# Patient Record
Sex: FEMALE | Race: WHITE | NOT HISPANIC OR LATINO | Employment: FULL TIME | URBAN - METROPOLITAN AREA
[De-identification: names, ages, dates, MRNs, and addresses within clinical notes are randomized per-mention and may not be internally consistent; named-entity substitution may affect disease eponyms.]

---

## 2017-01-26 ENCOUNTER — OFFICE VISIT (OUTPATIENT)
Dept: INTERNAL MEDICINE | Facility: CLINIC | Age: 47
End: 2017-01-26

## 2017-01-26 VITALS
DIASTOLIC BLOOD PRESSURE: 78 MMHG | WEIGHT: 135 LBS | TEMPERATURE: 97.8 F | BODY MASS INDEX: 21.79 KG/M2 | SYSTOLIC BLOOD PRESSURE: 120 MMHG

## 2017-01-26 DIAGNOSIS — N30.90 CYSTITIS: ICD-10-CM

## 2017-01-26 DIAGNOSIS — R30.0 DYSURIA: Primary | ICD-10-CM

## 2017-01-26 LAB
BACTERIA UR QL AUTO: ABNORMAL /HPF
BILIRUB UR QL STRIP: NEGATIVE
CLARITY UR: ABNORMAL
COLOR UR: YELLOW
GLUCOSE UR STRIP-MCNC: NEGATIVE MG/DL
HGB UR QL STRIP.AUTO: ABNORMAL
HYALINE CASTS UR QL AUTO: ABNORMAL /LPF
KETONES UR QL STRIP: NEGATIVE
LEUKOCYTE ESTERASE UR QL STRIP.AUTO: ABNORMAL
NITRITE UR QL STRIP: NEGATIVE
PH UR STRIP.AUTO: <=5 [PH] (ref 5–8)
PROT UR QL STRIP: NEGATIVE
RBC # UR: ABNORMAL /HPF
REF LAB TEST METHOD: ABNORMAL
SP GR UR STRIP: <=1.005 (ref 1–1.03)
SQUAMOUS #/AREA URNS HPF: ABNORMAL /HPF
UROBILINOGEN UR QL STRIP: ABNORMAL
WBC UR QL AUTO: ABNORMAL /HPF

## 2017-01-26 PROCEDURE — 81001 URINALYSIS AUTO W/SCOPE: CPT | Performed by: NURSE PRACTITIONER

## 2017-01-26 PROCEDURE — 99213 OFFICE O/P EST LOW 20 MIN: CPT | Performed by: NURSE PRACTITIONER

## 2017-01-26 RX ORDER — PHENAZOPYRIDINE HYDROCHLORIDE 100 MG/1
100 TABLET, FILM COATED ORAL 3 TIMES DAILY PRN
Qty: 6 TABLET | Refills: 0 | Status: SHIPPED | OUTPATIENT
Start: 2017-01-26 | End: 2017-01-28

## 2017-01-26 RX ORDER — SULFAMETHOXAZOLE AND TRIMETHOPRIM 800; 160 MG/1; MG/1
1 TABLET ORAL 2 TIMES DAILY
Qty: 10 TABLET | Refills: 0 | Status: SHIPPED | OUTPATIENT
Start: 2017-01-26 | End: 2017-01-31

## 2017-01-26 NOTE — PATIENT INSTRUCTIONS
Urinary Tract Infection  Urinary tract infections (UTIs) can develop anywhere along your urinary tract. Your urinary tract is your body's drainage system for removing wastes and extra water. Your urinary tract includes two kidneys, two ureters, a bladder, and a urethra. Your kidneys are a pair of bean-shaped organs. Each kidney is about the size of your fist. They are located below your ribs, one on each side of your spine.  CAUSES  Infections are caused by microbes, which are microscopic organisms, including fungi, viruses, and bacteria. These organisms are so small that they can only be seen through a microscope. Bacteria are the microbes that most commonly cause UTIs.  SYMPTOMS   Symptoms of UTIs may vary by age and gender of the patient and by the location of the infection. Symptoms in young women typically include a frequent and intense urge to urinate and a painful, burning feeling in the bladder or urethra during urination. Older women and men are more likely to be tired, shaky, and weak and have muscle aches and abdominal pain. A fever may mean the infection is in your kidneys. Other symptoms of a kidney infection include pain in your back or sides below the ribs, nausea, and vomiting.  DIAGNOSIS  To diagnose a UTI, your caregiver will ask you about your symptoms. Your caregiver will also ask you to provide a urine sample. The urine sample will be tested for bacteria and white blood cells. White blood cells are made by your body to help fight infection.  TREATMENT   Typically, UTIs can be treated with medication. Because most UTIs are caused by a bacterial infection, they usually can be treated with the use of antibiotics. The choice of antibiotic and length of treatment depend on your symptoms and the type of bacteria causing your infection.  HOME CARE INSTRUCTIONS  · If you were prescribed antibiotics, take them exactly as your caregiver instructs you. Finish the medication even if you feel better after  you have only taken some of the medication.  · Drink enough water and fluids to keep your urine clear or pale yellow.  · Avoid caffeine, tea, and carbonated beverages. They tend to irritate your bladder.  · Empty your bladder often. Avoid holding urine for long periods of time.  · Empty your bladder before and after sexual intercourse.  · After a bowel movement, women should cleanse from front to back. Use each tissue only once.  SEEK MEDICAL CARE IF:   · You have back pain.  · You develop a fever.  · Your symptoms do not begin to resolve within 3 days.  SEEK IMMEDIATE MEDICAL CARE IF:   · You have severe back pain or lower abdominal pain.  · You develop chills.  · You have nausea or vomiting.  · You have continued burning or discomfort with urination.  MAKE SURE YOU:   · Understand these instructions.  · Will watch your condition.  · Will get help right away if you are not doing well or get worse.     This information is not intended to replace advice given to you by your health care provider. Make sure you discuss any questions you have with your health care provider.     Document Released: 09/27/2006 Document Revised: 09/07/2016 Document Reviewed: 01/25/2013  [employer] Interactive Patient Education ©2016 [employer]

## 2017-01-26 NOTE — PROGRESS NOTES
Subjective   Agnes Hayes is a 46 y.o. female.     Difficulty Urinating    This is a new problem. Episode onset: 2 days ago  The problem occurs intermittently. The problem has been gradually worsening. The quality of the pain is described as burning. The pain is at a severity of 10/10. The pain is severe. There has been no fever. She is sexually active. There is no history of pyelonephritis. Associated symptoms include frequency and urgency. Pertinent negatives include no chills, discharge, flank pain, hematuria, nausea or vomiting. Associated symptoms comments: Currently on menses . She has tried increased fluids for the symptoms. The treatment provided mild relief. Her past medical history is significant for recurrent UTIs (last in august ). There is no history of kidney stones.        The following portions of the patient's history were reviewed and updated as appropriate: allergies, current medications and problem list.    Review of Systems   Constitutional: Negative for activity change, appetite change, chills, fatigue and fever.   Gastrointestinal: Negative for abdominal pain, nausea and vomiting.   Genitourinary: Positive for difficulty urinating, dysuria, frequency and urgency. Negative for flank pain and hematuria.   Musculoskeletal: Negative for back pain.       Objective   Physical Exam   Constitutional: She is oriented to person, place, and time. She appears well-developed and well-nourished.   HENT:   Head: Normocephalic.   Nose: Nose normal.   Cardiovascular: Regular rhythm and normal heart sounds.  Exam reveals no S3 and no S4.    No murmur heard.  Pulmonary/Chest: Effort normal and breath sounds normal. She has no decreased breath sounds. She has no wheezes. She has no rhonchi. She has no rales.   Abdominal: Normal appearance and bowel sounds are normal. There is no hepatosplenomegaly. There is no tenderness. There is no CVA tenderness.   Musculoskeletal: She exhibits no edema.   Neurological: She is  alert and oriented to person, place, and time. Gait normal.   Skin: Skin is warm and dry.   Psychiatric: She has a normal mood and affect.       Assessment/Plan   Agnes was seen today for difficulty urinating.    Diagnoses and all orders for this visit:    Dysuria  -     Urinalysis With / Microscopic If Indicated; Future  -     Urinalysis With / Microscopic If Indicated  -     Urinalysis, Microscopic Only; Future  -     Urinalysis, Microscopic Only    Cystitis  Comments:  drink plenty of water; avoid caffiene; void as soon as urge   Orders:  -     phenazopyridine (PYRIDIUM) 100 MG tablet; Take 1 tablet by mouth 3 (Three) Times a Day As Needed for bladder spasms for up to 2 days.  -     sulfamethoxazole-trimethoprim (BACTRIM DS,SEPTRA DS) 800-160 MG per tablet; Take 1 tablet by mouth 2 (Two) Times a Day for 5 days.

## 2017-01-26 NOTE — MR AVS SNAPSHOT
Agnes Hayes   1/26/2017 8:00 AM   Office Visit    Dept Phone:  [phone]   Encounter #:  [id number]    Provider:  HENRY Mccracken   Department:  [facility] INTERNAL MEDICINE                Your Full Care Plan              Today's Medication Changes          These changes are accurate as of: 1/26/17  8:29 AM.  If you have any questions, ask your nurse or doctor.               New Medication(s)Ordered:     phenazopyridine 100 MG tablet   Commonly known as:  PYRIDIUM   Take 1 tablet by mouth 3 (Three) Times a Day As Needed for bladder spasms for up to 2 days.       sulfamethoxazole-trimethoprim 800-160 MG per tablet   Commonly known as:  BACTRIM DS,SEPTRA DS   Take 1 tablet by mouth 2 (Two) Times a Day for 5 days.            Where to Get Your Medications      These medications were sent to [place] [id number] [facility] [phone] [facility] [phone] [place] [address]     Phone:  [phone]     phenazopyridine 100 MG tablet    sulfamethoxazole-trimethoprim 800-160 MG per tablet                  Your Updated Medication List          This list is accurate as of: 1/26/17  8:29 AM.  Always use your most recent med list.                aspirin 81 MG EC tablet       b complex-C-E-zinc tablet       cetirizine 10 MG tablet   Commonly known as:  zyrTEC       cholecalciferol 1000 UNITS tablet   Commonly known as:  VITAMIN D3       fish oil 1000 MG capsule capsule       losartan 50 MG tablet   Commonly known as:  COZAAR   Take 1 tablet by mouth Daily.       multivitamin tablet tablet       phenazopyridine 100 MG tablet   Commonly known as:  PYRIDIUM   Take 1 tablet by mouth 3 (Three) Times a Day As Needed for bladder spasms for up to 2 days.       sulfamethoxazole-trimethoprim 800-160 MG per tablet   Commonly known as:  BACTRIM DS,SEPTRA DS   Take 1 tablet by mouth 2 (Two) Times a Day for 5 days.                  We Performed the Following     Urinalysis With / Microscopic If Indicated     Urinalysis, Microscopic Only       You Were Diagnosed With        Codes Comments    Dysuria    -  Primary ICD-10-CM: R30.0  ICD-9-CM: 788.1     Cystitis     ICD-10-CM: N30.90  ICD-9-CM: 595.9 drink plenty of water; avoid caffiene; void as soon as urge       Instructions    Urinary Tract Infection  Urinary tract infections (UTIs) can develop anywhere along your urinary tract. Your urinary tract is your body's drainage system for removing wastes and extra water. Your urinary tract includes two kidneys, two ureters, a bladder, and a urethra. Your kidneys are a pair of bean-shaped organs. Each kidney is about the size of your fist. They are located below your ribs, one on each side of your spine.  CAUSES  Infections are caused by microbes, which are microscopic organisms, including fungi, viruses, and bacteria. These organisms are so small that they can only be seen through a microscope. Bacteria are the microbes that most commonly cause UTIs.  SYMPTOMS   Symptoms of UTIs may vary by age and gender of the patient and by the location of the infection. Symptoms in young women typically include a frequent and intense urge to urinate and a painful, burning feeling in the bladder or urethra during urination. Older women and men are more likely to be tired, shaky, and weak and have muscle aches and abdominal pain. A fever may mean the infection is in your kidneys. Other symptoms of a kidney infection include pain in your back or sides below the ribs, nausea, and vomiting.  DIAGNOSIS  To diagnose a UTI, your caregiver will ask you about your symptoms. Your caregiver will also ask you to provide a urine sample. The urine sample will be tested for bacteria and white blood cells. White blood cells are made by your body to help fight infection.  TREATMENT   Typically, UTIs can be treated with medication. Because most UTIs are caused by a bacterial  infection, they usually can be treated with the use of antibiotics. The choice of antibiotic and length of treatment depend on your symptoms and the type of bacteria causing your infection.  HOME CARE INSTRUCTIONS  · If you were prescribed antibiotics, take them exactly as your caregiver instructs you. Finish the medication even if you feel better after you have only taken some of the medication.  · Drink enough water and fluids to keep your urine clear or pale yellow.  · Avoid caffeine, tea, and carbonated beverages. They tend to irritate your bladder.  · Empty your bladder often. Avoid holding urine for long periods of time.  · Empty your bladder before and after sexual intercourse.  · After a bowel movement, women should cleanse from front to back. Use each tissue only once.  SEEK MEDICAL CARE IF:   · You have back pain.  · You develop a fever.  · Your symptoms do not begin to resolve within 3 days.  SEEK IMMEDIATE MEDICAL CARE IF:   · You have severe back pain or lower abdominal pain.  · You develop chills.  · You have nausea or vomiting.  · You have continued burning or discomfort with urination.  MAKE SURE YOU:   · Understand these instructions.  · Will watch your condition.  · Will get help right away if you are not doing well or get worse.     This information is not intended to replace advice given to you by your health care provider. Make sure you discuss any questions you have with your health care provider.     Document Released: 09/27/2006 Document Revised: 09/07/2016 Document Reviewed: 01/25/2013  [employer] Interactive Patient Education ©2016 [employer]       Patient Instructions History      Upcoming Appointments     Visit Type Date Time Department    ACUTE           1/26/2017  8:00 AM [employer]    FOLLOW UP 5/30/2017  3:30 PM [employer] Signup     [facility] allows you to send messages to your doctor, view your test results, renew your prescriptions, schedule  appointments, and more. To sign up, go to [employer] and click on the Sign Up Now link in the New User? box. Enter your [employer] Activation Code exactly as it appears below along with the last four digits of your Social Security Number and your Date of Birth () to complete the sign-up process. If you do not sign up before the expiration date, you must request a new code.    [employer] Activation Code: [email or web address]  Expires: 2017  8:29 AM    If you have questions, you can email [email or web address] or call [phone] to talk to our [employer] staff. Remember, [employer] is NOT to be used for urgent needs. For medical emergencies, dial 911.               Other Info from Your Visit           Your Appointments     May 30, 2017  3:30 PM EDT   Follow Up with Sophia Sullivan MD   [facility] INTERNAL MEDICINE (--)    [address] [place] [id number] [phone]           Arrive 15 minutes prior to appointment.              Allergies     No Known Allergies      Reason for Visit     Difficulty Urinating burning, urgency, frequency      Vital Signs     Blood Pressure Temperature Weight Body Mass Index Smoking Status       120/78 (BP Location: Right arm, Patient Position: Sitting, Cuff Size: Adult) 97.8 °F (36.6 °C) 135 lb (61.2 kg) 21.79 kg/m2 Never Smoker       Problems and Diagnoses Noted     Difficult or painful urination    -  Primary    Inflammation of bladder          Results     Urinalysis With / Microscopic If Indicated      Component Value Standard Range & Units    Color, UA Yellow Yellow, Straw    Appearance, UA Slightly Cloudy Clear    pH, UA <=5.0 5.0 - 8.0    Specific Gravity, UA <=1.005 1.005 - 1.030    Glucose, UA Negative Negative    Ketones, UA Negative Negative    Bilirubin, UA Negative Negative    Blood, UA Large (3+) Negative    Protein, UA Negative Negative    Leuk Esterase, UA Moderate (2+) Negative    Nitrite, UA Negative Negative     Urobilinogen, UA 0.2 E.U./dL 0.2 - 1.0 E.U./dL                Urinalysis, Microscopic Only      Component Value Standard Range & Units    RBC, UA 3-5 None Seen /HPF    Patient is on menses.    WBC, UA 31-50 None Seen /HPF    Bacteria, UA 1+ None Seen /HPF    Squamous Epithelial Cells, UA 7-12 None Seen, 0-2 /HPF    Hyaline Casts, UA None Seen None Seen /LPF    Methodology Manual Light Microscopy

## 2017-01-28 LAB
BACTERIA UR CULT: ABNORMAL
Lab: ABNORMAL
SUSCEPTIBILITY TESTING: ABNORMAL

## 2017-04-25 ENCOUNTER — OFFICE VISIT (OUTPATIENT)
Dept: INTERNAL MEDICINE | Facility: CLINIC | Age: 47
End: 2017-04-25

## 2017-04-25 VITALS
SYSTOLIC BLOOD PRESSURE: 112 MMHG | BODY MASS INDEX: 21.86 KG/M2 | HEIGHT: 66 IN | HEART RATE: 60 BPM | DIASTOLIC BLOOD PRESSURE: 78 MMHG | WEIGHT: 136 LBS | TEMPERATURE: 98.2 F | OXYGEN SATURATION: 99 %

## 2017-04-25 DIAGNOSIS — N30.90 CYSTITIS: ICD-10-CM

## 2017-04-25 DIAGNOSIS — R35.0 FREQUENT URINATION: Primary | ICD-10-CM

## 2017-04-25 LAB
BACTERIA UR QL AUTO: ABNORMAL /HPF
BILIRUB UR QL STRIP: NEGATIVE
CLARITY UR: ABNORMAL
COLOR UR: YELLOW
GLUCOSE UR STRIP-MCNC: NEGATIVE MG/DL
HGB UR QL STRIP.AUTO: ABNORMAL
HYALINE CASTS UR QL AUTO: ABNORMAL /LPF
KETONES UR QL STRIP: NEGATIVE
LEUKOCYTE ESTERASE UR QL STRIP.AUTO: ABNORMAL
NITRITE UR QL STRIP: NEGATIVE
PH UR STRIP.AUTO: 7 [PH] (ref 5–8)
PROT UR QL STRIP: NEGATIVE
RBC # UR: ABNORMAL /HPF
REF LAB TEST METHOD: ABNORMAL
SP GR UR STRIP: 1.01 (ref 1–1.03)
SQUAMOUS #/AREA URNS HPF: ABNORMAL /HPF
UROBILINOGEN UR QL STRIP: ABNORMAL
WBC UR QL AUTO: ABNORMAL /HPF

## 2017-04-25 PROCEDURE — 81001 URINALYSIS AUTO W/SCOPE: CPT | Performed by: NURSE PRACTITIONER

## 2017-04-25 PROCEDURE — 99213 OFFICE O/P EST LOW 20 MIN: CPT | Performed by: NURSE PRACTITIONER

## 2017-04-25 RX ORDER — PHENAZOPYRIDINE HYDROCHLORIDE 100 MG/1
100 TABLET, FILM COATED ORAL 3 TIMES DAILY PRN
Qty: 6 TABLET | Refills: 0 | Status: SHIPPED | OUTPATIENT
Start: 2017-04-25 | End: 2017-04-27

## 2017-04-25 RX ORDER — SULFAMETHOXAZOLE AND TRIMETHOPRIM 800; 160 MG/1; MG/1
1 TABLET ORAL 2 TIMES DAILY
Qty: 14 TABLET | Refills: 0 | Status: SHIPPED | OUTPATIENT
Start: 2017-04-25 | End: 2017-05-02

## 2017-04-25 NOTE — PATIENT INSTRUCTIONS
Urinary Tract Infection, Adult  A urinary tract infection (UTI) is an infection of any part of the urinary tract, which includes the kidneys, ureters, bladder, and urethra. These organs make, store, and get rid of urine in the body. UTI can be a bladder infection (cystitis) or kidney infection (pyelonephritis).  CAUSES  This infection may be caused by fungi, viruses, or bacteria. Bacteria are the most common cause of UTIs. This condition can also be caused by repeated incomplete emptying of the bladder during urination.   RISK FACTORS  This condition is more likely to develop if:   · You ignore your need to urinate or hold urine for long periods of time.  · You do not empty your bladder completely during urination.  · You wipe back to front after urinating or having a bowel movement, if you are female.  · You are uncircumcised, if you are male.    · You are constipated.  · You have a urinary catheter that stays in place (indwelling).  · You have a weak defense (immune) system.  · You have a medical condition that affects your bowels, kidneys, or bladder.  · You have diabetes.  · You take antibiotic medicines frequently or for long periods of time, and the antibiotics no longer work well against certain types of infections (antibiotic resistance).  · You take medicines that irritate your urinary tract.  · You are exposed to chemicals that irritate your urinary tract.  · You are female.  SYMPTOMS   Symptoms of this condition include:   · Fever.    · Frequent urination or passing small amounts of urine frequently.    · Needing to urinate urgently.    · Pain or burning with urination.    · Urine that smells bad or unusual.    · Cloudy urine.    · Pain in the lower abdomen or back.    · Trouble urinating.    · Blood in the urine.    · Vomiting or being less hungry than normal.     · Diarrhea or abdominal pain.    · Vaginal discharge, if you are female.  DIAGNOSIS  This condition is diagnosed with a medical history and  physical exam. You will also need to provide a urine sample to test your urine. Other tests may be done, including:    · Blood tests.    · Sexually transmitted disease (STD) testing.     If you have had more than one UTI, a cystoscopy or imaging studies may be done to determine the cause of the infections.   TREATMENT   Treatment for this condition often includes a combination of two or more of the following:   · Antibiotic medicine.    · Other medicines to treat less common causes of UTI.    · Over-the-counter medicines to treat pain.    · Drinking enough water to stay hydrated.  HOME CARE INSTRUCTIONS  · Take over-the-counter and prescription medicines only as told by your health care provider.  · If you were prescribed an antibiotic, take it as told by your health care provider. Do not stop taking the antibiotic even if you start to feel better.  · Avoid alcohol, caffeine, tea, and carbonated beverages. They can irritate your bladder.  · Drink enough fluid to keep your urine clear or pale yellow.  · Keep all follow-up visits as told by your health care provider. This is important.  · Make sure to:    Empty your bladder often and completely. Do not hold urine for long periods of time.    Empty your bladder before and after sex.    Wipe from front to back after a bowel movement if you are female. Use each tissue one time when you wipe.  SEEK MEDICAL CARE IF:   · You have back pain.    · You have a fever.  · You feel nauseous or vomit.    · Your symptoms do not get better after 3 days.     · Your symptoms go away and then return.  SEEK IMMEDIATE MEDICAL CARE IF:   · You have severe back pain or lower abdominal pain.    · You are vomiting and cannot keep down any medicines or water.     This information is not intended to replace advice given to you by your health care provider. Make sure you discuss any questions you have with your health care provider.     Document Released: 09/27/2006 Document Revised: 01/13/2017  Document Reviewed: 11/07/2016  [employer] Interactive Patient Education ©2016 [employer].

## 2017-04-25 NOTE — PROGRESS NOTES
Subjective   Agnes Hayes is a 46 y.o. female.     Urinary Tract Infection    This is a new problem. Episode onset: 3 days ago  The problem occurs intermittently. The problem has been gradually worsening. The quality of the pain is described as burning. The pain is at a severity of 10/10. The pain is severe. There has been no fever. She is sexually active. There is no history of pyelonephritis. Associated symptoms include frequency and urgency. Pertinent negatives include no chills, discharge, flank pain, hematuria, nausea or vomiting. Associated symptoms comments: Denies nocturia . She has tried increased fluids for the symptoms. The treatment provided mild relief. Her past medical history is significant for recurrent UTIs.        The following portions of the patient's history were reviewed and updated as appropriate: allergies, current medications and problem list.    Review of Systems   Constitutional: Negative for chills, fatigue and fever.   Gastrointestinal: Negative for abdominal pain, nausea and vomiting.   Genitourinary: Positive for dysuria, frequency and urgency. Negative for flank pain and hematuria.   Musculoskeletal: Negative for back pain.       Objective   Physical Exam   Constitutional: She is oriented to person, place, and time. She appears well-developed and well-nourished.   HENT:   Head: Normocephalic.   Nose: Nose normal.   Cardiovascular: Regular rhythm and normal heart sounds.  Exam reveals no S3 and no S4.    No murmur heard.  Pulmonary/Chest: Effort normal and breath sounds normal. She has no decreased breath sounds. She has no wheezes. She has no rhonchi. She has no rales.   Abdominal: Normal appearance and bowel sounds are normal. There is no tenderness. There is no CVA tenderness.   Musculoskeletal: She exhibits no edema.   Neurological: She is alert and oriented to person, place, and time. Gait normal.   Skin: Skin is warm and dry.   Psychiatric: She has a normal mood and affect.        Assessment/Plan   Agnes was seen today for urinary tract infection.    Diagnoses and all orders for this visit:    Frequent urination  -     Urinalysis w/ Microscopic if Indicated  -     Urinalysis, Microscopic Only; Future  -     Urinalysis, Microscopic Only    Cystitis  Comments:  drink plenty of water; void after intercourse; avoid caffiene  Orders:  -     Urine Culture; Future  -     phenazopyridine (PYRIDIUM) 100 MG tablet; Take 1 tablet by mouth 3 (Three) Times a Day As Needed for bladder spasms for up to 2 days.  -     sulfamethoxazole-trimethoprim (BACTRIM DS,SEPTRA DS) 800-160 MG per tablet; Take 1 tablet by mouth 2 (Two) Times a Day for 7 days.      Urine with blood, white blood cells and bacteria. Will treat and obtain urine culture.

## 2017-04-27 LAB
BACTERIA UR CULT: NORMAL
Lab: NORMAL

## 2017-05-30 ENCOUNTER — OFFICE VISIT (OUTPATIENT)
Dept: INTERNAL MEDICINE | Facility: CLINIC | Age: 47
End: 2017-05-30

## 2017-05-30 VITALS
SYSTOLIC BLOOD PRESSURE: 118 MMHG | DIASTOLIC BLOOD PRESSURE: 74 MMHG | BODY MASS INDEX: 22.08 KG/M2 | WEIGHT: 137.4 LBS | HEIGHT: 66 IN

## 2017-05-30 DIAGNOSIS — Z00.00 ANNUAL PHYSICAL EXAM: Primary | ICD-10-CM

## 2017-05-30 DIAGNOSIS — E78.49 OTHER HYPERLIPIDEMIA: ICD-10-CM

## 2017-05-30 DIAGNOSIS — I10 ESSENTIAL HYPERTENSION: ICD-10-CM

## 2017-05-30 DIAGNOSIS — Z12.11 SCREEN FOR COLON CANCER: ICD-10-CM

## 2017-05-30 DIAGNOSIS — K59.01 SLOW TRANSIT CONSTIPATION: ICD-10-CM

## 2017-05-30 DIAGNOSIS — Z12.4 SCREENING FOR CERVICAL CANCER: ICD-10-CM

## 2017-05-30 DIAGNOSIS — Z12.31 ENCOUNTER FOR SCREENING MAMMOGRAM FOR BREAST CANCER: ICD-10-CM

## 2017-05-30 DIAGNOSIS — Z23 NEED FOR VACCINATION: ICD-10-CM

## 2017-05-30 DIAGNOSIS — Z82.49 FAMILY HISTORY OF HEART DISEASE: ICD-10-CM

## 2017-05-30 LAB — HEMOCCULT STL QL IA: NEGATIVE

## 2017-05-30 PROCEDURE — 99396 PREV VISIT EST AGE 40-64: CPT | Performed by: INTERNAL MEDICINE

## 2017-05-30 PROCEDURE — 90715 TDAP VACCINE 7 YRS/> IM: CPT | Performed by: INTERNAL MEDICINE

## 2017-05-30 PROCEDURE — 90471 IMMUNIZATION ADMIN: CPT | Performed by: INTERNAL MEDICINE

## 2017-05-30 PROCEDURE — 82274 ASSAY TEST FOR BLOOD FECAL: CPT | Performed by: INTERNAL MEDICINE

## 2017-05-30 RX ORDER — LIDOCAINE HCL 4 %
1 CREAM (GRAM) TOPICAL DAILY
COMMUNITY
End: 2018-06-28 | Stop reason: HOSPADM

## 2017-06-02 LAB
CHROM ANALY OVERALL INTERP-IMP: NORMAL
CONV .: NORMAL
CONV PERFORMED BY:: NORMAL
DX ICD CODE: NORMAL
HIV 1 & 2 AB SER-IMP: NORMAL
HPV I/H RISK 1 DNA CVX QL PROBE+SIG AMP: NEGATIVE
REF LAB TEST METHOD: NORMAL
STAT OF ADQ CVX/VAG CYTO-IMP: NORMAL

## 2017-06-13 ENCOUNTER — HOSPITAL ENCOUNTER (OUTPATIENT)
Dept: MAMMOGRAPHY | Facility: HOSPITAL | Age: 47
Discharge: HOME | End: 2017-06-13
Attending: INTERNAL MEDICINE | Admitting: INTERNAL MEDICINE

## 2017-06-13 DIAGNOSIS — Z12.31 ENCOUNTER FOR SCREENING MAMMOGRAM FOR BREAST CANCER: ICD-10-CM

## 2017-06-13 PROCEDURE — G0202 SCR MAMMO BI INCL CAD: HCPCS

## 2017-06-16 DIAGNOSIS — N64.89 BREAST ASYMMETRY: Primary | ICD-10-CM

## 2017-06-29 ENCOUNTER — HOSPITAL ENCOUNTER (OUTPATIENT)
Dept: MAMMOGRAPHY | Facility: HOSPITAL | Age: 47
Discharge: HOME | End: 2017-06-29
Attending: INTERNAL MEDICINE | Admitting: INTERNAL MEDICINE

## 2017-06-29 ENCOUNTER — HOSPITAL ENCOUNTER (OUTPATIENT)
Dept: ULTRASOUND IMAGING | Facility: HOSPITAL | Age: 47
Discharge: HOME | End: 2017-06-29
Attending: INTERNAL MEDICINE

## 2017-06-29 DIAGNOSIS — N64.89 BREAST ASYMMETRY: ICD-10-CM

## 2017-06-29 PROCEDURE — G0206 DX MAMMO INCL CAD UNI: HCPCS

## 2017-06-29 PROCEDURE — 76642 ULTRASOUND BREAST LIMITED: CPT

## 2017-08-07 ENCOUNTER — TELEPHONE (OUTPATIENT)
Dept: INTERNAL MEDICINE | Facility: CLINIC | Age: 47
End: 2017-08-07

## 2017-08-07 ENCOUNTER — LAB (OUTPATIENT)
Dept: INTERNAL MEDICINE | Facility: CLINIC | Age: 47
End: 2017-08-07

## 2017-08-07 DIAGNOSIS — N39.0 URINARY TRACT INFECTION, SITE UNSPECIFIED: Primary | ICD-10-CM

## 2017-08-07 DIAGNOSIS — I10 ESSENTIAL HYPERTENSION: ICD-10-CM

## 2017-08-07 DIAGNOSIS — R82.90 ABNORMAL FINDING IN URINE: Primary | ICD-10-CM

## 2017-08-07 DIAGNOSIS — E78.49 OTHER HYPERLIPIDEMIA: ICD-10-CM

## 2017-08-07 LAB
ALBUMIN SERPL-MCNC: 3.91 G/DL (ref 3.4–4.6)
ALBUMIN/GLOB SERPL: 1.4 G/DL
ALP SERPL-CCNC: 59 U/L (ref 46–116)
ALT SERPL W P-5'-P-CCNC: 17 U/L (ref 14–59)
ANION GAP SERPL CALCULATED.3IONS-SCNC: 9 MMOL/L
AST SERPL-CCNC: 13 U/L (ref 7–37)
BACTERIA UR QL AUTO: ABNORMAL /HPF
BASOPHILS # BLD AUTO: 0.02 10*3/MM3 (ref 0–0.2)
BASOPHILS NFR BLD AUTO: 0.7 % (ref 0–1.5)
BILIRUB SERPL-MCNC: 0.4 MG/DL (ref 0.2–1)
BILIRUB UR QL STRIP: NEGATIVE
BUN BLD-MCNC: 14 MG/DL (ref 6–22)
BUN/CREAT SERPL: 19.2 (ref 7–25)
CALCIUM SPEC-SCNC: 9 MG/DL (ref 8.6–10.5)
CHLORIDE SERPL-SCNC: 105 MMOL/L (ref 95–107)
CHOLEST SERPL-MCNC: 194 MG/DL (ref 0–200)
CLARITY UR: ABNORMAL
CO2 SERPL-SCNC: 28 MMOL/L (ref 23–32)
COLOR UR: YELLOW
CREAT BLD-MCNC: 0.73 MG/DL (ref 0.55–1.02)
EOSINOPHIL # BLD AUTO: 0.1 10*3/MM3 (ref 0–0.7)
EOSINOPHIL # BLD AUTO: 3.3 % (ref 0.3–6.2)
ERYTHROCYTE [DISTWIDTH] IN BLOOD BY AUTOMATED COUNT: 12.1 % (ref 11.7–13)
GFR SERPL CREATININE-BSD FRML MDRD: 86 ML/MIN/1.73
GLOBULIN UR ELPH-MCNC: 2.9 GM/DL
GLUCOSE BLD-MCNC: 85 MG/DL (ref 70–100)
GLUCOSE UR STRIP-MCNC: NEGATIVE MG/DL
HCT VFR BLD AUTO: 39.1 % (ref 35.6–45.5)
HDLC SERPL-MCNC: 73 MG/DL (ref 40–81)
HGB BLD-MCNC: 12.9 G/DL (ref 11.9–15.5)
HGB UR QL STRIP.AUTO: ABNORMAL
HYALINE CASTS UR QL AUTO: ABNORMAL /LPF
IMM GRANULOCYTES # BLD: 0 10*3/MM3 (ref 0–0.03)
IMM GRANULOCYTES NFR BLD: 0 % (ref 0–0.5)
KETONES UR QL STRIP: ABNORMAL
LDLC SERPL CALC-MCNC: 110 MG/DL (ref 0–100)
LDLC/HDLC SERPL: 1.51 {RATIO}
LEUKOCYTE ESTERASE UR QL STRIP.AUTO: ABNORMAL
LYMPHOCYTES # BLD AUTO: 1.03 10*3/MM3 (ref 0.9–4.8)
LYMPHOCYTES NFR BLD AUTO: 33.7 % (ref 19.6–45.3)
MCH RBC QN AUTO: 30.8 PG (ref 26.9–32)
MCHC RBC AUTO-ENTMCNC: 33 G/DL (ref 32.4–36.3)
MCV RBC AUTO: 93.3 FL (ref 80.5–98.2)
MONOCYTES # BLD AUTO: 0.22 10*3/MM3 (ref 0.2–1.2)
MONOCYTES NFR BLD AUTO: 7.2 % (ref 5–12)
NEUTROPHILS # BLD AUTO: 1.69 10*3/MM3 (ref 1.9–8.1)
NEUTROPHILS NFR BLD AUTO: 55.1 % (ref 42.7–76)
NITRITE UR QL STRIP: NEGATIVE
PH UR STRIP.AUTO: 6 [PH] (ref 5–8)
PLATELET # BLD AUTO: 225 10*3/MM3 (ref 140–500)
POTASSIUM BLD-SCNC: 4.4 MMOL/L (ref 3.3–5.3)
PROT SERPL-MCNC: 6.8 G/DL (ref 6.3–8.4)
PROT UR QL STRIP: ABNORMAL
RBC # BLD AUTO: 4.19 10*6/MM3 (ref 3.9–5.2)
RBC # UR: ABNORMAL /HPF
REF LAB TEST METHOD: ABNORMAL
SODIUM BLD-SCNC: 142 MMOL/L (ref 136–145)
SP GR UR STRIP: 1.02 (ref 1–1.03)
SQUAMOUS #/AREA URNS HPF: ABNORMAL /HPF
TRIGL SERPL-MCNC: 54 MG/DL (ref 0–150)
UROBILINOGEN UR QL STRIP: ABNORMAL
VLDLC SERPL-MCNC: 10.8 MG/DL
WBC # BLD AUTO: 3.06 10*3/MM3 (ref 4.5–10.7)
WBC UR QL AUTO: ABNORMAL /HPF

## 2017-08-07 PROCEDURE — 81001 URINALYSIS AUTO W/SCOPE: CPT | Performed by: INTERNAL MEDICINE

## 2017-08-07 PROCEDURE — 80061 LIPID PANEL: CPT | Performed by: INTERNAL MEDICINE

## 2017-08-07 PROCEDURE — 80053 COMPREHEN METABOLIC PANEL: CPT | Performed by: INTERNAL MEDICINE

## 2017-08-07 RX ORDER — SULFAMETHOXAZOLE AND TRIMETHOPRIM 800; 160 MG/1; MG/1
1 TABLET ORAL 2 TIMES DAILY
Qty: 19 TABLET | Refills: 0 | Status: SHIPPED | OUTPATIENT
Start: 2017-08-07 | End: 2018-06-28

## 2017-08-07 NOTE — TELEPHONE ENCOUNTER
Dr. Sullivan patient:    U/A is in process and currently waiting on mirco from lab. Once results are received they will be placed in your tray for review.    Thank you

## 2017-08-07 NOTE — TELEPHONE ENCOUNTER
----- Message from Varsha Sylvester sent at 8/7/2017  9:17 AM EDT -----  Contact: patient  Patient wants to add another pharmacy to her chart and it is the one that she wants to use TODAY if she does indeed have a UTI,  like she thinks she does.  She just had labs done.    [facility]  [address] [facility] [address] [facility]  [phone]    Thank you,    Varsha

## 2017-08-10 LAB
BACTERIA UR CULT: ABNORMAL
Lab: ABNORMAL
SUSCEPTIBILITY TESTING: ABNORMAL

## 2017-08-16 ENCOUNTER — TELEPHONE (OUTPATIENT)
Dept: INTERNAL MEDICINE | Facility: CLINIC | Age: 47
End: 2017-08-16

## 2017-08-16 NOTE — TELEPHONE ENCOUNTER
----- Message from Kierra Domínguez sent at 8/16/2017  1:52 PM EDT -----  Contact: Patient  Patient called.  Dr. Brewer prescribed the patient Bactrim last week for UTI.  Patient began taking it 08/10/2017.  Patient began having stomach cramps yesterday, and patient feels as if she is having diarrhea but when she goes to bathroom it is only mucus and blood, and that is since yesterday too.  Should she continue the Bactrim?  Patient concerned.  Please advise.      Patient:  [phone]    Pharmacy:  [facility] [address] AT [facility] - [phone] [facility] [phone] FX

## 2017-08-17 NOTE — TELEPHONE ENCOUNTER
I left message - come in today if still having problems.  Ok to stop bactrim if she already took 3 days of ab.

## 2017-12-27 DIAGNOSIS — I10 HYPERTENSION, BENIGN: ICD-10-CM

## 2017-12-27 RX ORDER — LOSARTAN POTASSIUM 50 MG/1
TABLET ORAL
Qty: 90 TABLET | Refills: 1 | Status: SHIPPED | OUTPATIENT
Start: 2017-12-27 | End: 2018-06-17 | Stop reason: SDUPTHER

## 2018-06-17 DIAGNOSIS — I10 HYPERTENSION, BENIGN: ICD-10-CM

## 2018-06-18 RX ORDER — LOSARTAN POTASSIUM 50 MG/1
TABLET ORAL
Qty: 90 TABLET | Refills: 1 | Status: SHIPPED | OUTPATIENT
Start: 2018-06-18 | End: 2018-12-16 | Stop reason: SDUPTHER

## 2018-06-28 ENCOUNTER — OFFICE VISIT (OUTPATIENT)
Dept: INTERNAL MEDICINE | Facility: CLINIC | Age: 48
End: 2018-06-28

## 2018-06-28 VITALS
OXYGEN SATURATION: 98 % | BODY MASS INDEX: 23.3 KG/M2 | HEART RATE: 61 BPM | DIASTOLIC BLOOD PRESSURE: 80 MMHG | SYSTOLIC BLOOD PRESSURE: 126 MMHG | HEIGHT: 66 IN | WEIGHT: 145 LBS

## 2018-06-28 DIAGNOSIS — Z00.00 ANNUAL PHYSICAL EXAM: Primary | ICD-10-CM

## 2018-06-28 DIAGNOSIS — Z12.31 ENCOUNTER FOR SCREENING MAMMOGRAM FOR BREAST CANCER: ICD-10-CM

## 2018-06-28 DIAGNOSIS — Z82.49 FAMILY HISTORY OF HEART DISEASE: ICD-10-CM

## 2018-06-28 DIAGNOSIS — E78.49 OTHER HYPERLIPIDEMIA: ICD-10-CM

## 2018-06-28 DIAGNOSIS — I10 ESSENTIAL HYPERTENSION: ICD-10-CM

## 2018-06-28 PROCEDURE — 99396 PREV VISIT EST AGE 40-64: CPT | Performed by: INTERNAL MEDICINE

## 2018-06-28 NOTE — PROGRESS NOTES
"Subjective   Agnes Hayes is a 48 y.o. female here for   Chief Complaint   Patient presents with   • Annual Exam   • Hyperlipidemia   • Hypertension   .    Vitals:    06/28/18 1451   BP: 126/80   BP Location: Left arm   Patient Position: Sitting   Cuff Size: Adult   Pulse: 61   SpO2: 98%   Weight: 65.8 kg (145 lb)   Height: 167.6 cm (66\")       Body mass index is 23.4 kg/m².    Hyperlipidemia   This is a chronic problem. The current episode started more than 1 year ago. The problem is controlled. Recent lipid tests were reviewed and are normal. She has no history of diabetes. Pertinent negatives include no chest pain, myalgias or shortness of breath.   Hypertension   This is a chronic problem. The current episode started more than 1 year ago. The problem is unchanged. The problem is controlled. Pertinent negatives include no chest pain, headaches, neck pain, palpitations or shortness of breath.        The following portions of the patient's history were reviewed and updated as appropriate: allergies, current medications, past social history and problem list.    Review of Systems   Constitutional: Negative for appetite change, chills, fatigue, fever and unexpected weight change.   HENT: Negative for congestion, ear pain, mouth sores, sore throat, tinnitus, trouble swallowing and voice change.    Eyes: Negative for pain and visual disturbance.   Respiratory: Negative for cough, choking, shortness of breath and wheezing.    Cardiovascular: Negative for chest pain, palpitations and leg swelling.   Gastrointestinal: Negative for abdominal pain, blood in stool, constipation, diarrhea, nausea and vomiting.   Endocrine: Negative for cold intolerance, heat intolerance, polydipsia and polyuria.   Genitourinary: Negative for difficulty urinating, dysuria, flank pain, frequency, hematuria and urgency.   Musculoskeletal: Negative for arthralgias, back pain, gait problem, joint swelling, myalgias, neck pain and neck stiffness. "   Skin: Negative for color change and rash.   Allergic/Immunologic: Negative for environmental allergies, food allergies and immunocompromised state.   Neurological: Negative for dizziness, tremors, seizures, syncope, speech difficulty, weakness, numbness and headaches.   Hematological: Negative for adenopathy. Does not bruise/bleed easily.   Psychiatric/Behavioral: Negative for agitation, confusion, decreased concentration, sleep disturbance and suicidal ideas. The patient is not nervous/anxious.        Objective   Physical Exam   Constitutional: She appears well-developed and well-nourished.   HENT:   Right Ear: Hearing, tympanic membrane, external ear and ear canal normal.   Left Ear: Hearing, tympanic membrane, external ear and ear canal normal.   Nose: Right sinus exhibits no maxillary sinus tenderness and no frontal sinus tenderness. Left sinus exhibits no maxillary sinus tenderness and no frontal sinus tenderness.   Eyes: Conjunctivae, EOM and lids are normal. Pupils are equal, round, and reactive to light.   Neck: Trachea normal. Neck supple. No JVD present. Carotid bruit is not present. No tracheal deviation present. No thyroid mass and no thyromegaly present.   Cardiovascular: Normal rate, regular rhythm, S1 normal and S2 normal.  Exam reveals no gallop and no friction rub.    No murmur heard.  Pulses:       Carotid pulses are 2+ on the right side, and 2+ on the left side.       Radial pulses are 2+ on the right side, and 2+ on the left side.        Dorsalis pedis pulses are 2+ on the right side, and 2+ on the left side.        Posterior tibial pulses are 2+ on the right side, and 2+ on the left side.   Pulmonary/Chest: Effort normal and breath sounds normal. Chest wall is not dull to percussion. Right breast exhibits no inverted nipple, no mass, no nipple discharge, no skin change and no tenderness. Left breast exhibits no inverted nipple, no mass, no nipple discharge, no skin change and no tenderness.    Abdominal: Soft. Normal aorta and bowel sounds are normal. She exhibits no abdominal bruit. There is no hepatosplenomegaly. There is no tenderness. There is no rebound and no guarding. No hernia.   Musculoskeletal: Normal range of motion. She exhibits no edema.   Lymphadenopathy:     She has no cervical adenopathy.     She has no axillary adenopathy.        Right: No supraclavicular adenopathy present.        Left: No supraclavicular adenopathy present.   Neurological: She is alert. She has normal strength. No cranial nerve deficit or sensory deficit. She displays a negative Romberg sign.   Reflex Scores:       Patellar reflexes are 2+ on the right side and 2+ on the left side.  Skin: Skin is warm and dry.   Nursing note and vitals reviewed.      Assessment/Plan   Diagnoses and all orders for this visit:    Annual physical exam  -     CBC Auto Differential; Future  -     Comprehensive Metabolic Panel; Future  -     Urinalysis With Microscopic If Indicated (No Culture) - Urine, Clean Catch; Future    Essential hypertension  Comments:  need weekly chk & call if bp over 140/90    Other hyperlipidemia  Comments:  continue diet/ex  Orders:  -     High Sensitivity CRP; Future  -     Homocysteine; Future  -     Lipoprotein NMR; Future    Family history of heart disease  Comments:  need cardiac testing  Orders:  -     High Sensitivity CRP; Future  -     Homocysteine; Future  -     Lipoprotein NMR; Future    Encounter for screening mammogram for breast cancer  -     Mammo Screening Bilateral With CAD; Future

## 2018-07-06 ENCOUNTER — HOSPITAL ENCOUNTER (OUTPATIENT)
Dept: MAMMOGRAPHY | Facility: HOSPITAL | Age: 48
Discharge: HOME | End: 2018-07-06
Attending: INTERNAL MEDICINE | Admitting: INTERNAL MEDICINE

## 2018-07-06 DIAGNOSIS — Z12.31 ENCOUNTER FOR SCREENING MAMMOGRAM FOR BREAST CANCER: ICD-10-CM

## 2018-07-06 PROCEDURE — 77067 SCR MAMMO BI INCL CAD: CPT

## 2018-07-16 ENCOUNTER — TELEPHONE (OUTPATIENT)
Dept: INTERNAL MEDICINE | Facility: CLINIC | Age: 48
End: 2018-07-16

## 2018-07-16 NOTE — TELEPHONE ENCOUNTER
----- Message from Zulma Michele sent at 7/16/2018 11:11 AM EDT -----  Contact: PT  Patient called and is asking for a diagnostic left breast US order to be placed in her chart for breast mass. She received a letter from [facility] saying she needed the test done and they were waiting on Dr Sullivan to put the order in. She is going to call scheduling tomorrow to see if they have the order.     Please advise

## 2018-07-16 NOTE — TELEPHONE ENCOUNTER
Results have been sent to Dr. Sullivan for review.     She will review results once she return back in the office.

## 2018-07-17 DIAGNOSIS — R92.8 ABNORMAL MAMMOGRAM: Primary | ICD-10-CM

## 2018-07-23 ENCOUNTER — HOSPITAL ENCOUNTER (OUTPATIENT)
Dept: ULTRASOUND IMAGING | Facility: HOSPITAL | Age: 48
Discharge: HOME | End: 2018-07-23
Attending: INTERNAL MEDICINE | Admitting: INTERNAL MEDICINE

## 2018-07-23 DIAGNOSIS — R92.8 ABNORMAL MAMMOGRAM: ICD-10-CM

## 2018-07-23 PROCEDURE — 76642 ULTRASOUND BREAST LIMITED: CPT

## 2018-08-10 ENCOUNTER — OFFICE VISIT (OUTPATIENT)
Dept: OBSTETRICS AND GYNECOLOGY | Age: 48
End: 2018-08-10

## 2018-08-10 VITALS
WEIGHT: 153 LBS | HEIGHT: 66 IN | SYSTOLIC BLOOD PRESSURE: 117 MMHG | DIASTOLIC BLOOD PRESSURE: 78 MMHG | BODY MASS INDEX: 24.59 KG/M2

## 2018-08-10 DIAGNOSIS — R30.0 BURNING WITH URINATION: Primary | ICD-10-CM

## 2018-08-10 DIAGNOSIS — N39.0 RECURRENT UTI: ICD-10-CM

## 2018-08-10 DIAGNOSIS — R32 INCONTINENCE IN FEMALE: ICD-10-CM

## 2018-08-10 PROCEDURE — 99203 OFFICE O/P NEW LOW 30 MIN: CPT | Performed by: OBSTETRICS & GYNECOLOGY

## 2018-08-10 RX ORDER — CEPHALEXIN 500 MG/1
500 CAPSULE ORAL AS NEEDED
Qty: 30 CAPSULE | Refills: 3 | Status: SHIPPED | OUTPATIENT
Start: 2018-08-10 | End: 2019-08-18 | Stop reason: SDUPTHER

## 2018-08-10 NOTE — PATIENT INSTRUCTIONS
Please come in whenever you feel you have a UTI    Take the antibiotic immediately prior to or up to two hours after intercourse

## 2018-08-10 NOTE — ASSESSMENT & PLAN NOTE
Records from [facility] obtained and reviewed as well as past medical record in epic. See HPI for details. Three documented episodes this year, at least two the prior year however patient reports undocumented episodes. E coli, pansensitive. Does not appear to have recurrent pain with negative cultures suggestive of bladder pain syndrome. Culture sent today to [facility] for asymptomatic bacteruria. Prior cystoscopy with urology wnl.  Patient states all UTI episodes occur after intercourse, and is now apprehensive about intercourse due to this. Plan for postcoital ppx with keflex, Rx today.

## 2018-08-10 NOTE — PROGRESS NOTES
New GYN Problem    Chief Complaint   Patient presents with   • Gynecologic Exam     annual today, last pap 2017 neg , mmg july 2018 neg        Presents for evaluation of recurrent UTI, also some urinary leakage. For a few years she knows that after having intercourse she is very likely to get a UTI. Has sx of frequency, dysuria, pelvic pain w episodes. She usually either goes to her PCP or to the [facility] in order to get checked out and get antibiotics. She most always does have a UTI, does not have a history of suspected UTI with negative culture. Prior workup with urology last year with cystoscopy and normal. Urinates after intercourse. Has become apprehensive about having intercourse bc of UTI issue    On reviewing records, 8/1/16 + Ecoli  1/17 E coli  4/17 urogenital britta, very contaminated specimen  8/17 E coli  5/18 E coli  6/18 very concerning UA but not cultured      Gynecologic Exam   The patient's pertinent negatives include no vaginal discharge. Pertinent negatives include no abdominal pain, chills, fever, hematuria or rash.       Review of Systems   Constitutional: Negative for activity change, chills, fatigue and fever.   HENT: Negative for trouble swallowing.    Respiratory: Negative for shortness of breath and wheezing.    Cardiovascular: Negative for chest pain and leg swelling.   Gastrointestinal: Negative for abdominal distention and abdominal pain.   Endocrine: Negative for cold intolerance and heat intolerance.   Genitourinary: Negative for hematuria, vaginal discharge and vaginal pain.   Skin: Negative for color change and rash.   Neurological: Negative for dizziness and weakness.   Hematological: Negative for adenopathy. Does not bruise/bleed easily.       Health Maintenance  Last mammogram: this yr  Last pap: 2017 neg/neg  Colonoscopy: ND     Histories  Past Medical History:   Diagnosis Date   • Gestational diabetes mellitus    • Hyperlipidemia    • Hypertension    • Leaking of urine    •  "Migraine    • UTI (urinary tract infection)        Past Surgical History:   Procedure Laterality Date   • BUNIONECTOMY     • CHOLECYSTECTOMY     • TUBAL ABDOMINAL LIGATION         Family History   Problem Relation Age of Onset   • Heart attack Mother    • Stroke Mother    • Hypertension Mother    • Diabetes Mother    • Heart attack Father    • Hypertension Father        Social History     Social History   • Marital status:      Spouse name: N/A   • Number of children: N/A   • Years of education: N/A     Occupational History   • Not on file.     Social History Main Topics   • Smoking status: Never Smoker   • Smokeless tobacco: Never Used   • Alcohol use No   • Drug use: No   • Sexual activity: Yes     Partners: Male     Birth control/ protection: Surgical      Comment: tubal     Other Topics Concern   • Not on file     Social History Narrative   • No narrative on file       OB History      Para Term  AB Living    2 2 2 0 0 2    SAB TAB Ectopic Molar Multiple Live Births                         Physical Exam    /78   Ht 167.6 cm (66\")   Wt 69.4 kg (153 lb)   BMI 24.69 kg/m²     BMI: Body mass index is 24.69 kg/m².     Skin No rash, no pallor noted   Psych Normal affect and mood, good eye contact   General:   alert, appears stated age and cooperative   Neck Nontender, no lymphadenopathy, no thyromegaly   Lung lungs clear to auscultation, no wheezes or rhonchi   Heart: heart regular rate and rhythm, no murmurs   Abdomen: soft, non-tender, without masses or organomegaly   Urethra and bladder: urethra hypermobile; grade 1 cystocele   Vulva: normal, Bartholin's, Urethra, [place]'s normal   Vagina: normal mucosa, normal discharge   Cervix: no lesions   Uterus: normal size or anteverted   Adnexa: normal adnexa and no mass, fullness, tenderness       Assessment/Plan    Problems Addressed this Visit     Recurrent UTI     Records from [facility] obtained and reviewed as well as past medical " record in epic. See HPI for details. Three documented episodes this year, at least two the prior year however patient reports undocumented episodes. E coli, pansensitive. Does not appear to have recurrent pain with negative cultures suggestive of bladder pain syndrome. Culture sent today to [facility] for asymptomatic bacteruria. Prior cystoscopy with urology wnl.  Patient states all UTI episodes occur after intercourse, and is now apprehensive about intercourse due to this. Plan for postcoital ppx with keflex, Rx today.          Relevant Medications    cephalexin (KEFLEX) 500 MG capsule    Incontinence in female     Notes small amount of urinary leakage throughout the day. Very small amt, wears pantyliner. Not associated with valsalva etc, and not consistent with urge. Some urethral hypermobility, but possibly has ISD. None demonstrated on exam today. Discussed urogynecology referral as not straightforward and could require further workup. Not interested at the moment, will consider in future.            Other Visit Diagnoses     Burning with urination    -  Primary    Relevant Orders    Urine Culture - Urine, Urine, Clean Catch (Completed)          Patient's BMI is Body mass index is 24.69 kg/m²., which is classified as normal.     Karen Arredondo MD  08/10/2018  8:15 AM

## 2018-08-12 LAB
BACTERIA UR CULT: NO GROWTH
BACTERIA UR CULT: NORMAL

## 2018-08-27 ENCOUNTER — LAB (OUTPATIENT)
Dept: INTERNAL MEDICINE | Facility: CLINIC | Age: 48
End: 2018-08-27

## 2018-08-27 DIAGNOSIS — Z82.49 FAMILY HISTORY OF HEART DISEASE: ICD-10-CM

## 2018-08-27 DIAGNOSIS — Z00.00 ANNUAL PHYSICAL EXAM: ICD-10-CM

## 2018-08-27 DIAGNOSIS — E78.49 OTHER HYPERLIPIDEMIA: ICD-10-CM

## 2018-08-27 PROCEDURE — 81001 URINALYSIS AUTO W/SCOPE: CPT | Performed by: INTERNAL MEDICINE

## 2018-08-27 PROCEDURE — 36415 COLL VENOUS BLD VENIPUNCTURE: CPT | Performed by: INTERNAL MEDICINE

## 2018-08-27 PROCEDURE — 85025 COMPLETE CBC W/AUTO DIFF WBC: CPT | Performed by: INTERNAL MEDICINE

## 2018-08-27 PROCEDURE — 80053 COMPREHEN METABOLIC PANEL: CPT | Performed by: INTERNAL MEDICINE

## 2018-08-27 PROCEDURE — 86141 C-REACTIVE PROTEIN HS: CPT | Performed by: INTERNAL MEDICINE

## 2018-12-16 DIAGNOSIS — I10 HYPERTENSION, BENIGN: ICD-10-CM

## 2018-12-17 RX ORDER — LOSARTAN POTASSIUM 50 MG/1
TABLET ORAL
Qty: 90 TABLET | Refills: 1 | Status: SHIPPED | OUTPATIENT
Start: 2018-12-17 | End: 2019-06-16 | Stop reason: SDUPTHER

## 2019-06-16 DIAGNOSIS — I10 HYPERTENSION, BENIGN: ICD-10-CM

## 2019-06-17 RX ORDER — LOSARTAN POTASSIUM 50 MG/1
TABLET ORAL
Qty: 90 TABLET | Refills: 1 | Status: SHIPPED | OUTPATIENT
Start: 2019-06-17 | End: 2019-12-08 | Stop reason: SDUPTHER

## 2019-07-22 ENCOUNTER — OFFICE VISIT (OUTPATIENT)
Dept: INTERNAL MEDICINE | Facility: CLINIC | Age: 49
End: 2019-07-22

## 2019-07-22 VITALS — BODY MASS INDEX: 24.75 KG/M2 | WEIGHT: 154 LBS | HEIGHT: 66 IN

## 2019-07-22 DIAGNOSIS — Z12.39 BREAST CANCER SCREENING: ICD-10-CM

## 2019-07-22 DIAGNOSIS — R73.09 ELEVATED GLUCOSE LEVEL: Primary | ICD-10-CM

## 2019-07-22 DIAGNOSIS — Z00.00 ANNUAL PHYSICAL EXAM: Primary | ICD-10-CM

## 2019-07-22 DIAGNOSIS — M54.6 ACUTE LEFT-SIDED THORACIC BACK PAIN: ICD-10-CM

## 2019-07-22 LAB
ALBUMIN SERPL-MCNC: 4.7 G/DL (ref 3.5–5.2)
ALBUMIN/GLOB SERPL: 1.6 G/DL
ALP SERPL-CCNC: 70 U/L (ref 39–117)
ALT SERPL W P-5'-P-CCNC: 15 U/L (ref 1–33)
ANION GAP SERPL CALCULATED.3IONS-SCNC: 10.5 MMOL/L (ref 5–15)
AST SERPL-CCNC: 16 U/L (ref 1–32)
BASOPHILS # BLD AUTO: 0.02 10*3/MM3 (ref 0–0.2)
BASOPHILS NFR BLD AUTO: 0.5 % (ref 0–1.5)
BILIRUB SERPL-MCNC: 0.4 MG/DL (ref 0.2–1.2)
BILIRUB UR QL STRIP: NEGATIVE
BUN BLD-MCNC: 10 MG/DL (ref 6–20)
BUN/CREAT SERPL: 14.1 (ref 7–25)
CALCIUM SPEC-SCNC: 9.5 MG/DL (ref 8.6–10.5)
CHLORIDE SERPL-SCNC: 101 MMOL/L (ref 98–107)
CHOLEST SERPL-MCNC: 211 MG/DL (ref 0–200)
CLARITY UR: ABNORMAL
CO2 SERPL-SCNC: 29.5 MMOL/L (ref 22–29)
COLOR UR: YELLOW
CREAT BLD-MCNC: 0.71 MG/DL (ref 0.57–1)
DEPRECATED RDW RBC AUTO: 38.8 FL (ref 37–54)
EOSINOPHIL # BLD AUTO: 0.05 10*3/MM3 (ref 0–0.4)
EOSINOPHIL NFR BLD AUTO: 1.2 % (ref 0.3–6.2)
ERYTHROCYTE [DISTWIDTH] IN BLOOD BY AUTOMATED COUNT: 11.9 % (ref 12.3–15.4)
GFR SERPL CREATININE-BSD FRML MDRD: 88 ML/MIN/1.73
GLOBULIN UR ELPH-MCNC: 2.9 GM/DL
GLUCOSE BLD-MCNC: 103 MG/DL (ref 65–99)
GLUCOSE UR STRIP-MCNC: NEGATIVE MG/DL
HBA1C MFR BLD: 5.05 % (ref 4.8–5.6)
HCT VFR BLD AUTO: 40 % (ref 34–46.6)
HDLC SERPL-MCNC: 73 MG/DL (ref 40–60)
HGB BLD-MCNC: 13.5 G/DL (ref 12–15.9)
HGB UR QL STRIP.AUTO: NEGATIVE
KETONES UR QL STRIP: NEGATIVE
LDLC SERPL CALC-MCNC: 122 MG/DL (ref 0–100)
LDLC/HDLC SERPL: 1.67 {RATIO}
LEUKOCYTE ESTERASE UR QL STRIP.AUTO: NEGATIVE
LYMPHOCYTES # BLD AUTO: 1.34 10*3/MM3 (ref 0.7–3.1)
LYMPHOCYTES NFR BLD AUTO: 31.2 % (ref 19.6–45.3)
MCH RBC QN AUTO: 31 PG (ref 26.6–33)
MCHC RBC AUTO-ENTMCNC: 33.8 G/DL (ref 31.5–35.7)
MCV RBC AUTO: 92 FL (ref 79–97)
MONOCYTES # BLD AUTO: 0.44 10*3/MM3 (ref 0.1–0.9)
MONOCYTES NFR BLD AUTO: 10.2 % (ref 5–12)
NEUTROPHILS # BLD AUTO: 2.45 10*3/MM3 (ref 1.7–7)
NEUTROPHILS NFR BLD AUTO: 56.9 % (ref 42.7–76)
NITRITE UR QL STRIP: NEGATIVE
PH UR STRIP.AUTO: 7.5 [PH] (ref 5–8)
PLATELET # BLD AUTO: 242 10*3/MM3 (ref 140–450)
PMV BLD AUTO: 9.6 FL (ref 6–12)
POTASSIUM BLD-SCNC: 4.6 MMOL/L (ref 3.5–5.2)
PROT SERPL-MCNC: 7.6 G/DL (ref 6–8.5)
PROT UR QL STRIP: NEGATIVE
RBC # BLD AUTO: 4.35 10*6/MM3 (ref 3.77–5.28)
SODIUM BLD-SCNC: 141 MMOL/L (ref 136–145)
SP GR UR STRIP: 1.01 (ref 1–1.03)
TRIGL SERPL-MCNC: 79 MG/DL (ref 0–150)
UROBILINOGEN UR QL STRIP: ABNORMAL
VLDLC SERPL-MCNC: 15.8 MG/DL (ref 5–40)
WBC NRBC COR # BLD: 4.3 10*3/MM3 (ref 3.4–10.8)

## 2019-07-22 PROCEDURE — 80053 COMPREHEN METABOLIC PANEL: CPT | Performed by: NURSE PRACTITIONER

## 2019-07-22 PROCEDURE — 36415 COLL VENOUS BLD VENIPUNCTURE: CPT | Performed by: NURSE PRACTITIONER

## 2019-07-22 PROCEDURE — 99396 PREV VISIT EST AGE 40-64: CPT | Performed by: NURSE PRACTITIONER

## 2019-07-22 PROCEDURE — 99213 OFFICE O/P EST LOW 20 MIN: CPT | Performed by: NURSE PRACTITIONER

## 2019-07-22 PROCEDURE — 81003 URINALYSIS AUTO W/O SCOPE: CPT | Performed by: NURSE PRACTITIONER

## 2019-07-22 PROCEDURE — 80061 LIPID PANEL: CPT | Performed by: NURSE PRACTITIONER

## 2019-07-22 PROCEDURE — 83036 HEMOGLOBIN GLYCOSYLATED A1C: CPT | Performed by: NURSE PRACTITIONER

## 2019-07-22 PROCEDURE — 85025 COMPLETE CBC W/AUTO DIFF WBC: CPT | Performed by: NURSE PRACTITIONER

## 2019-07-22 RX ORDER — CEPHALEXIN 500 MG/1
500 CAPSULE ORAL AS NEEDED
Start: 2019-07-22 | End: 2019-08-12 | Stop reason: SDUPTHER

## 2019-07-22 RX ORDER — CEPHALEXIN 500 MG/1
500 CAPSULE ORAL
COMMUNITY
End: 2019-07-22

## 2019-07-22 RX ORDER — METHOCARBAMOL 500 MG/1
500-1000 TABLET, FILM COATED ORAL 4 TIMES DAILY
Qty: 40 TABLET | Refills: 0 | Status: SHIPPED | OUTPATIENT
Start: 2019-07-22 | End: 2020-07-24

## 2019-07-22 NOTE — PROGRESS NOTES
Subjective     Agnes Hayes is a 49 y.o. female.         She feels well overall despite L thoracic back pain. She generally eats well and exercises regularly. She is UTD on dental and eye exams.      Back Pain   This is a new problem. The current episode started in the past 7 days. The problem occurs constantly. The problem is unchanged. The pain is present in the thoracic spine. The quality of the pain is described as stabbing. The pain does not radiate. The pain is at a severity of 4/10. The pain is mild. The pain is the same all the time. Exacerbated by: breathing. Pertinent negatives include no abdominal pain, bladder incontinence, bowel incontinence, chest pain, dysuria, fever, headaches, leg pain, numbness, paresis, paresthesias, pelvic pain, perianal numbness, tingling, weakness or weight loss. She has tried NSAIDs for the symptoms. The treatment provided no relief.        The following portions of the patient's history were reviewed and updated as appropriate: allergies, current medications, past social history and problem list.    Past Medical History:   Diagnosis Date   • Gestational diabetes mellitus    • Hyperlipidemia    • Hypertension    • Leaking of urine    • Migraine    • UTI (urinary tract infection)          Current Outpatient Medications:   •  aspirin 81 MG EC tablet, Take 81 mg by mouth daily., Disp: , Rfl:   •  B Complex-C-E-Zn (B COMPLEX-C-E-ZINC) tablet, Take 1 tablet by mouth daily., Disp: , Rfl:   •  cephalexin (KEFLEX) 500 MG capsule, Take 1 capsule by mouth As Needed (UTI). 1 capsule by mouth after intercourse, Disp: , Rfl:   •  cetirizine (ZyrTEC) 10 MG tablet, Take 10 mg by mouth daily., Disp: , Rfl:   •  cholecalciferol (VITAMIN D3) 1000 UNITS tablet, Take 1,000 Units by mouth daily., Disp: , Rfl:   •  losartan (COZAAR) 50 MG tablet, TAKE 1 TABLET BY MOUTH ONCE DAILY, Disp: 90 tablet, Rfl: 1  •  multivitamin (DAILY IRENE) tablet tablet, Take 1 tablet by mouth daily., Disp: , Rfl:   •   "Omega-3 Fatty Acids (FISH OIL) 1000 MG capsule capsule, Take 1,000 mg by mouth daily with breakfast., Disp: , Rfl:     No Known Allergies    Review of Systems   Constitutional: Negative for fatigue, fever and weight loss.   HENT: Negative for congestion, hearing loss and trouble swallowing.    Eyes: Negative for visual disturbance.   Respiratory: Negative for apnea, cough, chest tightness, shortness of breath and wheezing.    Cardiovascular: Negative for chest pain, palpitations and leg swelling.   Gastrointestinal: Negative for abdominal distention, abdominal pain, bowel incontinence, constipation, diarrhea, nausea and vomiting.   Endocrine: Negative for cold intolerance, heat intolerance, polydipsia, polyphagia and polyuria.   Genitourinary: Negative for bladder incontinence, difficulty urinating, dysuria, frequency, pelvic pain and urgency.   Musculoskeletal: Positive for back pain. Negative for gait problem.   Skin: Negative for pallor and rash.   Neurological: Negative for tingling, speech difficulty, weakness, numbness, headaches and paresthesias.   Psychiatric/Behavioral: Negative for agitation, behavioral problems and confusion. The patient is not nervous/anxious.        Objective     Vitals:    07/22/19 0747   Weight: 69.9 kg (154 lb)   Height: 167.6 cm (66\")     Wt Readings from Last 3 Encounters:   07/22/19 69.9 kg (154 lb)   08/10/18 69.4 kg (153 lb)   06/28/18 65.8 kg (145 lb)     Temp Readings from Last 3 Encounters:   04/25/17 98.2 °F (36.8 °C) (Oral)   01/26/17 97.8 °F (36.6 °C)     BP Readings from Last 3 Encounters:   08/10/18 117/78   06/28/18 126/80   05/30/17 118/74     Pulse Readings from Last 3 Encounters:   06/28/18 61   04/25/17 60       Physical Exam   Constitutional: She is oriented to person, place, and time. She appears well-developed and well-nourished.   HENT:   Head: Normocephalic and atraumatic.   Right Ear: Hearing and tympanic membrane normal.   Left Ear: Hearing and tympanic " membrane normal.   Nose: Nose normal.   Mouth/Throat: Uvula is midline, oropharynx is clear and moist and mucous membranes are normal. No tonsillar exudate.   Eyes: Conjunctivae, EOM and lids are normal. Pupils are equal, round, and reactive to light.   Neck: Normal range of motion. Carotid bruit is not present. No thyromegaly present.   Cardiovascular: Normal rate, regular rhythm, normal heart sounds and intact distal pulses.   No murmur heard.  Pulses:       Carotid pulses are 2+ on the right side, and 2+ on the left side.       Dorsalis pedis pulses are 2+ on the right side, and 2+ on the left side.   Pulmonary/Chest: Effort normal and breath sounds normal. No respiratory distress. She has no decreased breath sounds. She has no wheezes. She has no rhonchi.   Abdominal: Soft. Bowel sounds are normal. She exhibits no distension. There is no hepatosplenomegaly. There is no tenderness. There is no rebound and no guarding.   Musculoskeletal: Normal range of motion. She exhibits no edema or deformity.        Thoracic back: She exhibits tenderness. She exhibits normal range of motion, no bony tenderness, no swelling and no edema.        Back:    Lymphadenopathy:        Head (right side): No submental, no submandibular and no tonsillar adenopathy present.        Head (left side): No submental, no submandibular and no tonsillar adenopathy present.   Neurological: She is alert and oriented to person, place, and time. She has normal strength and normal reflexes. No cranial nerve deficit or sensory deficit. Coordination and gait normal.   Skin: Skin is warm, dry and intact.   Psychiatric: She has a normal mood and affect. Her speech is normal and behavior is normal. Judgment and thought content normal. She is attentive.   Vitals reviewed.      Assessment/Plan     Iris was seen today for annual exam and back pain.    Diagnoses and all orders for this visit:    Annual physical exam  -     Comprehensive Metabolic Panel  -      Lipid Panel  -     Urinalysis With Microscopic If Indicated (No Culture) - Urine, Clean Catch  -     CBC Auto Differential    Acute left-sided thoracic back pain  -     methocarbamol (ROBAXIN) 500 MG tablet; Take 1-2 tablets by mouth 4 (Four) Times a Day.    Breast cancer screening  -     Mammo Screening Bilateral With CAD; Future    She will use ibuprofen daily with food for about 1 week with the muscle relaxers and perform stretches, massage, and exercises to help with the thoracic pain. She may need steroid pack if sx do not resolve. Instructed to use tennis ball against the floor or a wall to help work the pain out. She can also use heat and muscle creams. She will return to office for persistent pain.    Encouraged healthy diet, regular exercise, maintenance of healthy weight, good sleep habits, avoidance of tobacco products and excessive alcohol.    She will f/u in 1 year for CPE.

## 2019-07-24 ENCOUNTER — TRANSCRIBE ORDERS (OUTPATIENT)
Dept: ADMINISTRATIVE | Facility: HOSPITAL | Age: 49
End: 2019-07-24

## 2019-07-24 DIAGNOSIS — Z12.31 VISIT FOR SCREENING MAMMOGRAM: Primary | ICD-10-CM

## 2019-07-25 DIAGNOSIS — Z12.39 BREAST CANCER SCREENING: Primary | ICD-10-CM

## 2019-08-08 ENCOUNTER — HOSPITAL ENCOUNTER (OUTPATIENT)
Dept: MAMMOGRAPHY | Facility: HOSPITAL | Age: 49
Discharge: HOME | End: 2019-08-08
Admitting: NURSE PRACTITIONER

## 2019-08-08 DIAGNOSIS — Z12.31 VISIT FOR SCREENING MAMMOGRAM: ICD-10-CM

## 2019-08-08 PROCEDURE — 77067 SCR MAMMO BI INCL CAD: CPT

## 2019-08-08 PROCEDURE — 77063 BREAST TOMOSYNTHESIS BI: CPT

## 2019-08-12 ENCOUNTER — OFFICE VISIT (OUTPATIENT)
Dept: OBSTETRICS AND GYNECOLOGY | Age: 49
End: 2019-08-12

## 2019-08-12 VITALS
SYSTOLIC BLOOD PRESSURE: 132 MMHG | BODY MASS INDEX: 25.13 KG/M2 | WEIGHT: 156.4 LBS | DIASTOLIC BLOOD PRESSURE: 82 MMHG | HEIGHT: 66 IN

## 2019-08-12 DIAGNOSIS — N39.0 RECURRENT UTI: Primary | ICD-10-CM

## 2019-08-12 PROCEDURE — 99396 PREV VISIT EST AGE 40-64: CPT | Performed by: OBSTETRICS & GYNECOLOGY

## 2019-08-12 RX ORDER — CEPHALEXIN 500 MG/1
500 CAPSULE ORAL AS NEEDED
Qty: 30 CAPSULE | Refills: 3
Start: 2019-08-12 | End: 2019-08-20

## 2019-08-12 NOTE — PROGRESS NOTES
Routine Annual Visit    2019    Patient: Agnes Hayes          MR#:[id number]      Chief Complaint   Patient presents with   • Gynecologic Exam     annual. last pap 17(-) last MG 19       History of Present Illness    49 y.o. female  who presents for annual exam. Starting the postcoital antbx had changed her life, has not had any UTIs this year!  She has a hx of endometrial ablation but still has a very light monthly period  She has leakage of urine it seems like all the time and without provocation like a cough or sneeze. She wears a pantyliner daily. She does not have urge sx. She is not ready to do anything about the incontinence.      Health Maintenance  Gardasil no  Last pap  normal/neg, plan q3 yrs  Mammogram last week, normal  Colonoscopy not yet  PCP Dr. Sullivan    No LMP recorded. Patient is not currently having periods (Reason: Tubal ligation).  Obstetric History:  OB History      Para Term  AB Living    2 2 2 0 0 2    SAB TAB Ectopic Molar Multiple Live Births                        Menstrual History:     No LMP recorded. Patient is not currently having periods (Reason: Tubal ligation).       Sexually active  ________________________________________  Patient Active Problem List   Diagnosis   • Hypertension   • Hyperlipidemia   • Slow transit constipation   • Family history of heart disease   • Recurrent UTI   • Incontinence in female       Past Medical History:   Diagnosis Date   • Gestational diabetes mellitus    • History of endometrial ablation    • Hyperlipidemia    • Hypertension    • Leaking of urine    • Migraine    • UTI (urinary tract infection)        Family History   Problem Relation Age of Onset   • Heart attack Mother    • Stroke Mother    • Hypertension Mother    • Diabetes Mother    • Heart attack Father    • Hypertension Father        Past Surgical History:   Procedure Laterality Date   • BUNIONECTOMY     • CHOLECYSTECTOMY     • TUBAL ABDOMINAL  "LIGATION  2002       Social History     Tobacco Use   Smoking Status Never Smoker   Smokeless Tobacco Never Used       has a current medication list which includes the following prescription(s): aspirin, cranberry, b complex-c-e-zinc, cephalexin, cetirizine, cholecalciferol, losartan, multivitamin, fish oil, and methocarbamol.  ________________________________________    Current contraception: tubal ligation  History of abnormal Pap smear: no  Family history of Breast, ovarian, uterine, colon, pancreatic cancer: no  History of abnormal mammogram: yes - w sonogram last year, this year normal    The following portions of the patient's history were reviewed and updated as appropriate: allergies, current medications, past family history, past medical history, past social history, past surgical history and problem list.    Review of Systems comprehensive review of systems is negative except for occasional urinary leakage    Objective   Physical Exam    /82   Ht 167.6 cm (66\")   Wt 70.9 kg (156 lb 6.4 oz)   Breastfeeding? No   BMI 25.24 kg/m²    BP Readings from Last 3 Encounters:   08/12/19 132/82   08/10/18 117/78   06/28/18 126/80      Wt Readings from Last 3 Encounters:   08/12/19 70.9 kg (156 lb 6.4 oz)   07/22/19 69.9 kg (154 lb)   08/10/18 69.4 kg (153 lb)         BMI: Body mass index is 25.24 kg/m².       General:   alert, appears stated age and cooperative   Heart:: regular rate and rhythm, S1, S2 normal, no murmur, click, rub or gallop   Lungs: normal respiratory effort and auscultation   Abdomen: soft, non-tender, without masses or organomegaly   Breast: inspection negative, no nipple discharge or bleeding, no masses or nodularity palpable   Urethra and bladder: urethral meatus normal; bladder nontender to palpation; grade 1 cystocele, no urethral hypermobility testing today   Vulva: normal, Bartholin's, Urethra, [place]'s normal   Vagina: normal mucosa, normal discharge   Cervix: multiparous appearance " and no lesions   Uterus: normal size or anteverted   Adnexa: normal adnexa and no mass, fullness, tenderness       Assessment:    normal annual exam     Plan:    Plan     []  Mammogram request made- just done  []  PAP done - plan next yr (q3 yr)  []  Labs:   []  GC/Chl/TV  []  DEXA scan   []  Referral for colonoscopy:     Problems Addressed this Visit        Genitourinary    Recurrent UTI - Primary          Counseling  [x]  Nutrition  [x]  Physical activity/regular exercise   [x]  Healthy weight  []  Injury prevention  []  Smoking cessation  []  Substance misuse/abuse  [x]  Sexual behavior  []  STD prevention  []  Contraception  []  Dental health  []  Mental health  []  Immunization  [x]  Encouraged SBE        Karen Arredondo MD  08/12/2019  6:03 PM

## 2019-08-20 RX ORDER — CEPHALEXIN 500 MG/1
CAPSULE ORAL
Qty: 30 CAPSULE | Refills: 3 | Status: SHIPPED | OUTPATIENT
Start: 2019-08-20 | End: 2020-08-17 | Stop reason: SDUPTHER

## 2019-08-20 RX ORDER — CEPHALEXIN 500 MG/1
500 CAPSULE ORAL AS NEEDED
Qty: 30 CAPSULE | Refills: 3
Start: 2019-08-20

## 2019-12-08 DIAGNOSIS — I10 HYPERTENSION, BENIGN: ICD-10-CM

## 2019-12-09 RX ORDER — LOSARTAN POTASSIUM 50 MG/1
TABLET ORAL
Qty: 90 TABLET | Refills: 1 | Status: SHIPPED | OUTPATIENT
Start: 2019-12-09 | End: 2020-06-08

## 2020-06-06 DIAGNOSIS — I10 HYPERTENSION, BENIGN: ICD-10-CM

## 2020-06-08 RX ORDER — LOSARTAN POTASSIUM 50 MG/1
TABLET ORAL
Qty: 90 TABLET | Refills: 0 | Status: SHIPPED | OUTPATIENT
Start: 2020-06-08 | End: 2020-09-08

## 2020-07-24 ENCOUNTER — OFFICE VISIT (OUTPATIENT)
Dept: INTERNAL MEDICINE | Facility: CLINIC | Age: 50
End: 2020-07-24

## 2020-07-24 VITALS
SYSTOLIC BLOOD PRESSURE: 138 MMHG | HEART RATE: 63 BPM | TEMPERATURE: 97.8 F | BODY MASS INDEX: 25.49 KG/M2 | WEIGHT: 158.6 LBS | HEIGHT: 66 IN | DIASTOLIC BLOOD PRESSURE: 88 MMHG | RESPIRATION RATE: 16 BRPM | OXYGEN SATURATION: 99 %

## 2020-07-24 DIAGNOSIS — R63.5 WEIGHT GAIN: ICD-10-CM

## 2020-07-24 DIAGNOSIS — Z12.11 COLON CANCER SCREENING: ICD-10-CM

## 2020-07-24 DIAGNOSIS — Z11.59 SCREENING FOR VIRAL DISEASE: ICD-10-CM

## 2020-07-24 DIAGNOSIS — E78.49 OTHER HYPERLIPIDEMIA: ICD-10-CM

## 2020-07-24 DIAGNOSIS — I10 ESSENTIAL HYPERTENSION: ICD-10-CM

## 2020-07-24 DIAGNOSIS — K59.01 SLOW TRANSIT CONSTIPATION: ICD-10-CM

## 2020-07-24 DIAGNOSIS — K64.9 HEMORRHOIDS, UNSPECIFIED HEMORRHOID TYPE: ICD-10-CM

## 2020-07-24 DIAGNOSIS — Z82.49 FAMILY HISTORY OF HEART DISEASE: ICD-10-CM

## 2020-07-24 DIAGNOSIS — Z00.00 ANNUAL PHYSICAL EXAM: Primary | ICD-10-CM

## 2020-07-24 DIAGNOSIS — Z83.3 FAMILY HISTORY OF DIABETES MELLITUS: ICD-10-CM

## 2020-07-24 PROCEDURE — 99396 PREV VISIT EST AGE 40-64: CPT | Performed by: INTERNAL MEDICINE

## 2020-07-24 NOTE — PROGRESS NOTES
"Subjective   Agnes Hayes is a 50 y.o. female here for   Chief Complaint   Patient presents with   • Annual Exam     Pt presents here today for a physical.   .    Vitals:    07/24/20 0826 07/24/20 0948   BP: 147/94 138/88   BP Location: Left arm    Patient Position: Sitting    Cuff Size: Adult    Pulse: 63    Resp: 16    Temp: 97.8 °F (36.6 °C)    TempSrc: Oral    SpO2: 99%    Weight: 71.9 kg (158 lb 9.6 oz)    Height: 167.6 cm (66\")        Body mass index is 25.6 kg/m².    History of Present Illness     The following portions of the patient's history were reviewed and updated as appropriate: allergies, current medications, past social history and problem list.    Review of Systems   Constitutional: Negative for appetite change, chills, fatigue, fever and unexpected weight change.   HENT: Negative for congestion, ear pain, mouth sores, sore throat, tinnitus, trouble swallowing and voice change.    Eyes: Negative for pain and visual disturbance.   Respiratory: Negative for cough, choking, shortness of breath and wheezing.    Cardiovascular: Negative for chest pain, palpitations and leg swelling.   Gastrointestinal: Positive for constipation (off & on) and rectal pain (off & on - from hemorrhoids). Negative for abdominal pain, blood in stool, diarrhea, nausea and vomiting.   Endocrine: Negative for cold intolerance, heat intolerance, polydipsia and polyuria.   Genitourinary: Positive for enuresis (1x). Negative for difficulty urinating, dysuria, flank pain, frequency, hematuria and urgency.   Musculoskeletal: Negative for arthralgias, back pain, gait problem, joint swelling, myalgias, neck pain and neck stiffness.   Skin: Negative for color change and rash.   Allergic/Immunologic: Positive for environmental allergies. Negative for food allergies and immunocompromised state.   Neurological: Negative for dizziness, tremors, seizures, syncope, speech difficulty, weakness, numbness and headaches.   Hematological: Negative " for adenopathy. Does not bruise/bleed easily.   Psychiatric/Behavioral: Negative for agitation, confusion, decreased concentration, dysphoric mood, sleep disturbance and suicidal ideas. The patient is not nervous/anxious.        Objective   Physical Exam   Constitutional: She appears well-developed and well-nourished.   HENT:   Right Ear: Hearing, tympanic membrane, external ear and ear canal normal.   Left Ear: Hearing, tympanic membrane, external ear and ear canal normal.   Nose: Right sinus exhibits no maxillary sinus tenderness and no frontal sinus tenderness. Left sinus exhibits no maxillary sinus tenderness and no frontal sinus tenderness.   Eyes: Pupils are equal, round, and reactive to light. Conjunctivae, EOM and lids are normal.   Neck: Trachea normal. Neck supple. No JVD present. Carotid bruit is not present. No tracheal deviation present. No thyroid mass and no thyromegaly present.   Cardiovascular: Normal rate, regular rhythm, S1 normal and S2 normal. Exam reveals no gallop and no friction rub.   No murmur heard.  Pulses:       Carotid pulses are 2+ on the right side, and 2+ on the left side.       Radial pulses are 2+ on the right side, and 2+ on the left side.        Dorsalis pedis pulses are 2+ on the right side, and 2+ on the left side.        Posterior tibial pulses are 2+ on the right side, and 2+ on the left side.   Pulmonary/Chest: Effort normal and breath sounds normal. Chest wall is not dull to percussion. Right breast exhibits no inverted nipple, no mass, no nipple discharge, no skin change and no tenderness. Left breast exhibits no inverted nipple, no mass, no nipple discharge, no skin change and no tenderness.   Abdominal: Soft. Normal aorta and bowel sounds are normal. She exhibits no abdominal bruit. There is no hepatosplenomegaly. There is no tenderness. There is no rebound and no guarding. No hernia.   Musculoskeletal: Normal range of motion. She exhibits no edema.   Lymphadenopathy:      She has no cervical adenopathy.     She has no axillary adenopathy.        Right: No supraclavicular adenopathy present.        Left: No supraclavicular adenopathy present.   Neurological: She is alert. She has normal strength. No cranial nerve deficit or sensory deficit. She displays a negative Romberg sign.   Reflex Scores:       Patellar reflexes are 2+ on the right side and 2+ on the left side.  Skin: Skin is warm and dry.   Nursing note and vitals reviewed.      Assessment/Plan   Diagnoses and all orders for this visit:    Annual physical exam  -     CBC & Differential  -     Comprehensive Metabolic Panel  -     Lipid Panel    Hemorrhoids, unspecified hemorrhoid type  Comments:  causing rectal pain off & on - refer to dr salvatore collado  Orders:  -     Ambulatory Referral to Colorectal Surgery    Colon cancer screening  -     Ambulatory Referral to Colorectal Surgery    Screening for viral disease  -     Hepatitis C Antibody    Weight gain  Comments:  mild - need daily exercise & low sugar/junk food  Orders:  -     TSH Rfx On Abnormal To Free T4    Family history of heart disease    Essential hypertension  Comments:  controlled? - need weekly chk & call if bp over 130/80 (will increase losartan to 100mg/d)    Other hyperlipidemia  Comments:  need diet/ex    Slow transit constipation  Comments:  need daily metamucil - may also take daily stool softener if needed    Family history of diabetes mellitus  Comments:  need sugar chk  Orders:  -     Hemoglobin A1c     Discussed need to stay up to date on screening exams as indicated on sex, age & risk factors. I encouraged healthy weight maintenance with diet & exercise. Need good sleep habits & continue to avoid tobacco & excessive alcohol.

## 2020-07-25 LAB
ALBUMIN SERPL-MCNC: 4.8 G/DL (ref 3.5–5.2)
ALBUMIN/GLOB SERPL: 2.2 G/DL
ALP SERPL-CCNC: 66 U/L (ref 39–117)
ALT SERPL-CCNC: 10 U/L (ref 1–33)
AST SERPL-CCNC: 18 U/L (ref 1–32)
BASOPHILS # BLD AUTO: 0.03 10*3/MM3 (ref 0–0.2)
BASOPHILS NFR BLD AUTO: 0.7 % (ref 0–1.5)
BILIRUB SERPL-MCNC: 0.5 MG/DL (ref 0–1.2)
BUN SERPL-MCNC: 12 MG/DL (ref 6–20)
BUN/CREAT SERPL: 14 (ref 7–25)
CALCIUM SERPL-MCNC: 9.2 MG/DL (ref 8.6–10.5)
CHLORIDE SERPL-SCNC: 100 MMOL/L (ref 98–107)
CHOLEST SERPL-MCNC: 200 MG/DL (ref 0–200)
CO2 SERPL-SCNC: 29.9 MMOL/L (ref 22–29)
CREAT SERPL-MCNC: 0.86 MG/DL (ref 0.57–1)
EOSINOPHIL # BLD AUTO: 0.06 10*3/MM3 (ref 0–0.4)
EOSINOPHIL NFR BLD AUTO: 1.4 % (ref 0.3–6.2)
ERYTHROCYTE [DISTWIDTH] IN BLOOD BY AUTOMATED COUNT: 12.2 % (ref 12.3–15.4)
GLOBULIN SER CALC-MCNC: 2.2 GM/DL
GLUCOSE SERPL-MCNC: 85 MG/DL (ref 65–99)
HBA1C MFR BLD: 5.09 % (ref 4.8–5.6)
HCT VFR BLD AUTO: 41 % (ref 34–46.6)
HCV AB S/CO SERPL IA: <0.1 S/CO RATIO (ref 0–0.9)
HDLC SERPL-MCNC: 69 MG/DL (ref 40–60)
HGB BLD-MCNC: 13.3 G/DL (ref 12–15.9)
IMM GRANULOCYTES # BLD AUTO: 0.01 10*3/MM3 (ref 0–0.05)
IMM GRANULOCYTES NFR BLD AUTO: 0.2 % (ref 0–0.5)
LDLC SERPL CALC-MCNC: 117 MG/DL (ref 0–100)
LYMPHOCYTES # BLD AUTO: 1.22 10*3/MM3 (ref 0.7–3.1)
LYMPHOCYTES NFR BLD AUTO: 28.6 % (ref 19.6–45.3)
MCH RBC QN AUTO: 30.2 PG (ref 26.6–33)
MCHC RBC AUTO-ENTMCNC: 32.4 G/DL (ref 31.5–35.7)
MCV RBC AUTO: 93 FL (ref 79–97)
MONOCYTES # BLD AUTO: 0.35 10*3/MM3 (ref 0.1–0.9)
MONOCYTES NFR BLD AUTO: 8.2 % (ref 5–12)
NEUTROPHILS # BLD AUTO: 2.59 10*3/MM3 (ref 1.7–7)
NEUTROPHILS NFR BLD AUTO: 60.9 % (ref 42.7–76)
NRBC BLD AUTO-RTO: 0 /100 WBC (ref 0–0.2)
PLATELET # BLD AUTO: 239 10*3/MM3 (ref 140–450)
POTASSIUM SERPL-SCNC: 4.2 MMOL/L (ref 3.5–5.2)
PROT SERPL-MCNC: 7 G/DL (ref 6–8.5)
RBC # BLD AUTO: 4.41 10*6/MM3 (ref 3.77–5.28)
SODIUM SERPL-SCNC: 139 MMOL/L (ref 136–145)
TRIGL SERPL-MCNC: 71 MG/DL (ref 0–150)
TSH SERPL DL<=0.005 MIU/L-ACNC: 1.35 UIU/ML (ref 0.27–4.2)
VLDLC SERPL CALC-MCNC: 14.2 MG/DL
WBC # BLD AUTO: 4.26 10*3/MM3 (ref 3.4–10.8)

## 2020-07-27 ENCOUNTER — TRANSCRIBE ORDERS (OUTPATIENT)
Dept: ADMINISTRATIVE | Facility: HOSPITAL | Age: 50
End: 2020-07-27

## 2020-07-27 DIAGNOSIS — Z12.31 VISIT FOR SCREENING MAMMOGRAM: Primary | ICD-10-CM

## 2020-08-11 ENCOUNTER — OFFICE VISIT (OUTPATIENT)
Dept: INTERNAL MEDICINE | Facility: CLINIC | Age: 50
End: 2020-08-11

## 2020-08-11 ENCOUNTER — APPOINTMENT (OUTPATIENT)
Dept: WOMENS IMAGING | Facility: HOSPITAL | Age: 50
End: 2020-08-11

## 2020-08-11 VITALS
WEIGHT: 158 LBS | SYSTOLIC BLOOD PRESSURE: 104 MMHG | HEIGHT: 66 IN | RESPIRATION RATE: 14 BRPM | BODY MASS INDEX: 25.39 KG/M2 | DIASTOLIC BLOOD PRESSURE: 62 MMHG

## 2020-08-11 DIAGNOSIS — G89.29 CHRONIC PAIN OF RIGHT KNEE: Primary | ICD-10-CM

## 2020-08-11 DIAGNOSIS — M25.561 CHRONIC PAIN OF RIGHT KNEE: Primary | ICD-10-CM

## 2020-08-11 PROCEDURE — 73560 X-RAY EXAM OF KNEE 1 OR 2: CPT | Performed by: RADIOLOGY

## 2020-08-11 PROCEDURE — 73560 X-RAY EXAM OF KNEE 1 OR 2: CPT | Performed by: NURSE PRACTITIONER

## 2020-08-11 PROCEDURE — 99213 OFFICE O/P EST LOW 20 MIN: CPT | Performed by: NURSE PRACTITIONER

## 2020-08-11 NOTE — PATIENT INSTRUCTIONS
Acute Knee Pain, Adult  Many things can cause knee pain. Sometimes, knee pain is sudden (acute) and may be caused by damage, swelling, or irritation of the muscles and tissues that support your knee.  The pain often goes away on its own with time and rest. If the pain does not go away, tests may be done to find out what is causing the pain.  Follow these instructions at home:  Pay attention to any changes in your symptoms. Take these actions to relieve your pain.  If you have a knee sleeve or brace:    · Wear the sleeve or brace as told by your doctor. Remove it only as told by your doctor.  · Loosen the sleeve or brace if your toes:  ? Tingle.  ? Become numb.  ? Turn cold and blue.  · Keep the sleeve or brace clean.  · If the sleeve or brace is not waterproof:  ? Do not let it get wet.  ? Cover it with a watertight covering when you take a bath or shower.  Activity  · Rest your knee.  · Do not do things that cause pain.  · Avoid activities where both feet leave the ground at the same time (high-impact activities). Examples are running, jumping rope, and doing jumping jacks.  · Work with a physical therapist to make a safe exercise program, as told by your doctor.  Managing pain, stiffness, and swelling    · If told, put ice on the knee:  ? Put ice in a plastic bag.  ? Place a towel between your skin and the bag.  ? Leave the ice on for 20 minutes, 2-3 times a day.  · If told, put pressure (compression) on your injured knee to control swelling, give support, and help with discomfort. Compression may be done with an elastic bandage.  General instructions  · Take all medicines only as told by your doctor.  · Raise (elevate) your knee while you are sitting or lying down. Make sure your knee is higher than your heart.  · Sleep with a pillow under your knee.  · Do not use any products that contain nicotine or tobacco. These include cigarettes, e-cigarettes, and chewing tobacco. These products may slow down healing. If  you need help quitting, ask your doctor.  · If you are overweight, work with your doctor and a food expert (dietitian) to set goals to lose weight. Being overweight can make your knee hurt more.  · Keep all follow-up visits as told by your doctor. This is important.  Contact a doctor if:  · The knee pain does not stop.  · The knee pain changes or gets worse.  · You have a fever along with knee pain.  · Your knee feels warm when you touch it.  · Your knee gives out or locks up.  Get help right away if:  · Your knee swells, and the swelling gets worse.  · You cannot move your knee.  · You have very bad knee pain.  Summary  · Many things can cause knee pain. The pain often goes away on its own with time and rest.  · Your doctor may do tests to find out the cause of the pain.  · Pay attention to any changes in your symptoms. Relieve your pain with rest, medicines, light activity, and use of ice.  · Get help right away if you cannot move your knee or your knee pain is very bad.  This information is not intended to replace advice given to you by your health care provider. Make sure you discuss any questions you have with your health care provider.  Document Released: 03/16/2010 Document Revised: 05/30/2019 Document Reviewed: 05/30/2019  Elsevier Patient Education © 2020 [employer]

## 2020-08-11 NOTE — PROGRESS NOTES
"           Name: Agnes Hayes  :  1970    Subjective:      Chief Complaint   Patient presents with   • Knee Pain     right knee         Agnes Hayes is a 50 y.o. female patient of Sophia Sullivan MD who is here for knee pain.     Knee Pain    The incident occurred more than 1 week ago (No incident - gradual for 3 months ). There was no injury mechanism. The pain is present in the right knee. The quality of the pain is described as aching. The pain is at a severity of 5/10. The pain is moderate. The pain has been worsening since onset. Pertinent negatives include no inability to bear weight, muscle weakness, numbness or tingling. She reports no foreign bodies present. The symptoms are aggravated by movement. She has tried nothing for the symptoms. The treatment provided no relief.     She states her knees have always \"creeked\" when she walks.  She used to have dull ache in knee when she would go down stairs.  It has progressively gotten worse and is now doing it when she walks.  She works in medical records.   She states she walks a lot and uses elliptical in the evening.   She denies any redness or swelling to knee. No specific event that started this.     I have reviewed the patient's medical history in detail and updated the computerized patient record.    Past Medical History:   Diagnosis Date   • Gestational diabetes mellitus    • History of endometrial ablation    • Hyperlipidemia    • Hypertension    • Leaking of urine    • Migraine    • UTI (urinary tract infection)        Past Surgical History:   Procedure Laterality Date   • BUNIONECTOMY     • CHOLECYSTECTOMY     • TUBAL ABDOMINAL LIGATION         Family History   Problem Relation Age of Onset   • Heart attack Mother    • Stroke Mother    • Hypertension Mother    • Diabetes Mother    • Heart attack Father    • Hypertension Father        Social History     Socioeconomic History   • Marital status:      Spouse name: Not on file   • " Number of children: Not on file   • Years of education: Not on file   • Highest education level: Not on file   Tobacco Use   • Smoking status: Never Smoker   • Smokeless tobacco: Never Used   Substance and Sexual Activity   • Alcohol use: No   • Drug use: No   • Sexual activity: Yes     Partners: Male     Birth control/protection: Surgical     Comment: tubal       Most Recent Immunizations   Administered Date(s) Administered   • Tdap 05/30/2017         Review of Systems:   Review of Systems   Constitutional: Negative for chills, fever and unexpected weight change.   Respiratory: Negative for shortness of breath.    Musculoskeletal:        Knee pain    Neurological: Negative for tingling and numbness.         Objective:      Physical Exam:   Physical Exam   Constitutional: She is oriented to person, place, and time. She appears well-developed. She is cooperative.   HENT:   Head: Normocephalic.   Wearing mask due to covid    Neck: No thyromegaly present.   Cardiovascular: Normal rate, regular rhythm, normal heart sounds, intact distal pulses and normal pulses.   Pulmonary/Chest: Effort normal and breath sounds normal. She exhibits no deformity.   Equal, Unlabored   Musculoskeletal: Normal range of motion. She exhibits no edema.        Right knee: She exhibits normal range of motion, no swelling, no effusion, no ecchymosis, no deformity, no laceration, no erythema, normal alignment, no LCL laxity and no MCL laxity. Tenderness found. Medial joint line tenderness noted.   Crepitus with rom BL Knees    Lymphadenopathy:     She has no cervical adenopathy.   Neurological: She is alert and oriented to person, place, and time.   Skin: Skin is warm and dry. Capillary refill takes 2 to 3 seconds.   Psychiatric: She has a normal mood and affect. Her behavior is normal. Judgment and thought content normal.   Vitals reviewed.        Vital Signs:  Vitals:    08/11/20 1056   BP: 104/62   BP Location: Left arm   Patient Position:  "Sitting   Cuff Size: Adult   Resp: 14   Weight: 71.7 kg (158 lb)   Height: 167.6 cm (66\")     Body mass index is 25.5 kg/m².      Results Review:      REVIEWED AND DISCUSSED LAB RESULTS WITH PATIENT      Requested Prescriptions      No prescriptions requested or ordered in this encounter     Routine medications provided by this office will also be refilled via pharmacy request.       Current Outpatient Medications:   •  aspirin 81 MG EC tablet, Take 81 mg by mouth daily., Disp: , Rfl:   •  AZO-CRANBERRY PO, Take  by mouth., Disp: , Rfl:   •  B Complex-C-E-Zn (B COMPLEX-C-E-ZINC) tablet, Take 1 tablet by mouth daily., Disp: , Rfl:   •  cephalexin (KEFLEX) 500 MG capsule, TAKE 1 CAPSULE BY MOUTH AS NEEDED(IMMEDIATELY PRIOR TO OR AFTER  INTERCOURSE) FOR UP TO 10 DAYS, Disp: 30 capsule, Rfl: 3  •  cetirizine (ZyrTEC) 10 MG tablet, Take 10 mg by mouth daily., Disp: , Rfl:   •  cholecalciferol (VITAMIN D3) 1000 UNITS tablet, Take 1,000 Units by mouth daily., Disp: , Rfl:   •  losartan (COZAAR) 50 MG tablet, Take 1 tablet by mouth once daily, Disp: 90 tablet, Rfl: 0  •  multivitamin (DAILY IRENE) tablet tablet, Take 1 tablet by mouth daily., Disp: , Rfl:   •  Omega-3 Fatty Acids (FISH OIL) 1000 MG capsule capsule, Take 1,000 mg by mouth daily with breakfast., Disp: , Rfl:        Assessment and Plan:        Problem List Items Addressed This Visit     None      Visit Diagnoses     Chronic pain of right knee    -  Primary    Relevant Orders    Ambulatory Referral to Orthopedic Surgery    XR Knee 1 or 2 View Right (In Office) (Completed)           New to me.  Worsening.  X-ray in office today and refer to ortho.   Advised she could take aleve with food daily.     Discussed any change in Rx and discussed visit with patient.  All questions answered.      Return if symptoms worsen or fail to improve.    Nico \"David\" Tracy, APRN   08/11/20    Dragon disclaimer:   Much of this encounter note is an electronic " transcription/translation of spoken language to printed text. The electronic translation of spoken language may permit erroneous, or at times, nonsensical words or phrases to be inadvertently transcribed; Although I have reviewed the note for such errors, some may still exist.     Additional Patient Counseling:       Patient Instructions   Acute Knee Pain, Adult  Many things can cause knee pain. Sometimes, knee pain is sudden (acute) and may be caused by damage, swelling, or irritation of the muscles and tissues that support your knee.  The pain often goes away on its own with time and rest. If the pain does not go away, tests may be done to find out what is causing the pain.  Follow these instructions at home:  Pay attention to any changes in your symptoms. Take these actions to relieve your pain.  If you have a knee sleeve or brace:    · Wear the sleeve or brace as told by your doctor. Remove it only as told by your doctor.  · Loosen the sleeve or brace if your toes:  ? Tingle.  ? Become numb.  ? Turn cold and blue.  · Keep the sleeve or brace clean.  · If the sleeve or brace is not waterproof:  ? Do not let it get wet.  ? Cover it with a watertight covering when you take a bath or shower.  Activity  · Rest your knee.  · Do not do things that cause pain.  · Avoid activities where both feet leave the ground at the same time (high-impact activities). Examples are running, jumping rope, and doing jumping jacks.  · Work with a physical therapist to make a safe exercise program, as told by your doctor.  Managing pain, stiffness, and swelling    · If told, put ice on the knee:  ? Put ice in a plastic bag.  ? Place a towel between your skin and the bag.  ? Leave the ice on for 20 minutes, 2-3 times a day.  · If told, put pressure (compression) on your injured knee to control swelling, give support, and help with discomfort. Compression may be done with an elastic bandage.  General instructions  · Take all medicines only  as told by your doctor.  · Raise (elevate) your knee while you are sitting or lying down. Make sure your knee is higher than your heart.  · Sleep with a pillow under your knee.  · Do not use any products that contain nicotine or tobacco. These include cigarettes, e-cigarettes, and chewing tobacco. These products may slow down healing. If you need help quitting, ask your doctor.  · If you are overweight, work with your doctor and a food expert (dietitian) to set goals to lose weight. Being overweight can make your knee hurt more.  · Keep all follow-up visits as told by your doctor. This is important.  Contact a doctor if:  · The knee pain does not stop.  · The knee pain changes or gets worse.  · You have a fever along with knee pain.  · Your knee feels warm when you touch it.  · Your knee gives out or locks up.  Get help right away if:  · Your knee swells, and the swelling gets worse.  · You cannot move your knee.  · You have very bad knee pain.  Summary  · Many things can cause knee pain. The pain often goes away on its own with time and rest.  · Your doctor may do tests to find out the cause of the pain.  · Pay attention to any changes in your symptoms. Relieve your pain with rest, medicines, light activity, and use of ice.  · Get help right away if you cannot move your knee or your knee pain is very bad.  This information is not intended to replace advice given to you by your health care provider. Make sure you discuss any questions you have with your health care provider.  Document Released: 03/16/2010 Document Revised: 05/30/2019 Document Reviewed: 05/30/2019  Elsevier Patient Education © 2020 [employer] Inc[employer]

## 2020-08-13 NOTE — PROGRESS NOTES
"Please notify her that knee x-ray showed degenerative changes.  No acute fracture.    It showed \"2 loose calcium bodies\" that is usually related to degenerative changes, it can be further evaluated at ortho.     WCN"

## 2020-08-17 ENCOUNTER — OFFICE VISIT (OUTPATIENT)
Dept: OBSTETRICS AND GYNECOLOGY | Age: 50
End: 2020-08-17

## 2020-08-17 VITALS
HEIGHT: 66 IN | SYSTOLIC BLOOD PRESSURE: 148 MMHG | DIASTOLIC BLOOD PRESSURE: 82 MMHG | BODY MASS INDEX: 25.23 KG/M2 | WEIGHT: 157 LBS

## 2020-08-17 DIAGNOSIS — Z01.419 ENCOUNTER FOR GYNECOLOGICAL EXAMINATION WITHOUT ABNORMAL FINDING: Primary | ICD-10-CM

## 2020-08-17 DIAGNOSIS — Z12.4 SCREENING FOR MALIGNANT NEOPLASM OF CERVIX: ICD-10-CM

## 2020-08-17 PROCEDURE — 99396 PREV VISIT EST AGE 40-64: CPT | Performed by: OBSTETRICS & GYNECOLOGY

## 2020-08-17 RX ORDER — CEPHALEXIN 500 MG/1
500 CAPSULE ORAL AS NEEDED
Qty: 30 CAPSULE | Refills: 3 | Status: SHIPPED | OUTPATIENT
Start: 2020-08-17 | End: 2021-10-27 | Stop reason: SDUPTHER

## 2020-08-17 NOTE — PROGRESS NOTES
Routine Annual Visit    2020    Patient: Agnes Hayes          MR#:[id number]      Chief Complaint   Patient presents with   • Gynecologic Exam     last pap 17 MG 19 no complaints today        History of Present Illness    50 y.o. female  who presents for annual exam.   Recurrent UTI issue resolved    Exercises elliptical 20 min 4x/wk    Urinary incontinence still, not interested in tx    No issues with SA    Still has light, regular menses. No pain or heavy bleeding. Is s/p ablation. No hot flashes    Health Maintenance  Last pap: , history abnormal: no  Mammogram: 2019, has one scheduled  Colonoscopy has one upcoming  Family history of Breast, ovarian, uterine, colon, pancreatic cancer: no  PCP Sophia Sullivan    Current contraception: tubal    No LMP recorded. (Menstrual status: Tubal ligation).  Obstetric History:  OB History        2    Para   2    Term   2       0    AB   0    Living   2       SAB        TAB        Ectopic        Molar        Multiple        Live Births                   Menstrual History:     No LMP recorded. (Menstrual status: Tubal ligation).       ________________________________________  Patient Active Problem List   Diagnosis   • Hypertension   • Hyperlipidemia   • Slow transit constipation   • Family history of heart disease   • Recurrent UTI   • Incontinence in female   • Hemorrhoids   • Weight gain   • Family history of diabetes mellitus       Past Medical History:   Diagnosis Date   • Gestational diabetes mellitus    • History of endometrial ablation    • Hyperlipidemia    • Hypertension    • Leaking of urine    • Migraine    • UTI (urinary tract infection)        Family History   Problem Relation Age of Onset   • Heart attack Mother    • Stroke Mother    • Hypertension Mother    • Diabetes Mother    • Heart attack Father    • Hypertension Father        Past Surgical History:   Procedure Laterality Date   • BUNIONECTOMY     • CHOLECYSTECTOMY  " 2003   • TUBAL ABDOMINAL LIGATION  2002       Social History     Tobacco Use   Smoking Status Never Smoker   Smokeless Tobacco Never Used       has a current medication list which includes the following prescription(s): aspirin, b complex-c-e-zinc, cephalexin, cetirizine, cholecalciferol, losartan, multivitamin, fish oil, and cranberry.  ________________________________________      Review of Systems   Constitutional: Negative for fever and unexpected weight change.   Respiratory: Negative for shortness of breath.    Cardiovascular: Negative for chest pain.   Gastrointestinal: Negative for abdominal pain, constipation and diarrhea.   Genitourinary: Negative for frequency and urgency.   Hematological: Negative for adenopathy.   Psychiatric/Behavioral: Negative for dysphoric mood.       Objective   Physical Exam    /82   Ht 167.6 cm (66\")   Wt 71.2 kg (157 lb)   Breastfeeding No   BMI 25.34 kg/m²    BP Readings from Last 3 Encounters:   08/17/20 148/82   08/11/20 104/62   07/24/20 138/88      Wt Readings from Last 3 Encounters:   08/17/20 71.2 kg (157 lb)   08/11/20 71.7 kg (158 lb)   07/24/20 71.9 kg (158 lb 9.6 oz)         BMI: Body mass index is 25.34 kg/m².       General:   alert, appears stated age and cooperative   Neck: No thyromegaly or LAD, no carotid bruit noted   Heart:: regular rate and rhythm, S1, S2 normal, no murmur, click, rub or gallop   Lungs: normal respiratory effort and auscultation   Abdomen: soft, non-tender, without masses or organomegaly   Breast: inspection negative, no nipple discharge or bleeding, no masses or nodularity palpable   Urethra and bladder: urethral meatus normal; bladder nontender to palpation;   Vulva: normal, Bartholin's, Urethra, [place]'s normal   Vagina: normal mucosa, normal discharge   Cervix: multiparous appearance and no lesions   Uterus: normal size or anteverted   Adnexa: normal adnexa and no mass, fullness, tenderness       Assessment:    normal annual exam "     Plan:    Plan     [x]  Mammogram request made  [x]  PAP done  []  Labs:   []  GC/Chl/TV  []  DEXA scan   []  Referral for colonoscopy:       Counseling  [x]  Nutrition  [x]  Physical activity/regular exercise   [x]  Healthy weight  []  Injury prevention  []  Smoking cessation  []  Substance misuse/abuse  [x]  Sexual behavior  []  STD prevention  []  Contraception  []  Dental health  [x]  Mental health coping with COVID  []  Immunization  [x]  Encouraged SBE          Karen Arredondo MD  08/17/2020  08:33

## 2020-08-19 LAB
CYTOLOGIST CVX/VAG CYTO: NORMAL
CYTOLOGY CVX/VAG DOC CYTO: NORMAL
CYTOLOGY CVX/VAG DOC THIN PREP: NORMAL
DX ICD CODE: NORMAL
HIV 1 & 2 AB SER-IMP: NORMAL
HPV I/H RISK 4 DNA CVX QL PROBE+SIG AMP: NEGATIVE
OTHER STN SPEC: NORMAL
STAT OF ADQ CVX/VAG CYTO-IMP: NORMAL

## 2020-08-27 ENCOUNTER — OFFICE VISIT (OUTPATIENT)
Dept: ORTHOPEDIC SURGERY | Facility: CLINIC | Age: 50
End: 2020-08-27

## 2020-08-27 VITALS — HEIGHT: 66 IN | BODY MASS INDEX: 25.23 KG/M2 | TEMPERATURE: 96.8 F | WEIGHT: 157 LBS

## 2020-08-27 DIAGNOSIS — M25.562 ACUTE BILATERAL KNEE PAIN: Primary | ICD-10-CM

## 2020-08-27 DIAGNOSIS — M25.561 ACUTE BILATERAL KNEE PAIN: Primary | ICD-10-CM

## 2020-08-27 PROCEDURE — 20610 DRAIN/INJ JOINT/BURSA W/O US: CPT | Performed by: NURSE PRACTITIONER

## 2020-08-27 PROCEDURE — 99213 OFFICE O/P EST LOW 20 MIN: CPT | Performed by: NURSE PRACTITIONER

## 2020-08-27 PROCEDURE — 73562 X-RAY EXAM OF KNEE 3: CPT | Performed by: NURSE PRACTITIONER

## 2020-08-27 RX ORDER — METHYLPREDNISOLONE ACETATE 80 MG/ML
80 INJECTION, SUSPENSION INTRA-ARTICULAR; INTRALESIONAL; INTRAMUSCULAR; SOFT TISSUE
Status: COMPLETED | OUTPATIENT
Start: 2020-08-27 | End: 2020-08-27

## 2020-08-27 RX ADMIN — METHYLPREDNISOLONE ACETATE 80 MG: 80 INJECTION, SUSPENSION INTRA-ARTICULAR; INTRALESIONAL; INTRAMUSCULAR; SOFT TISSUE at 09:10

## 2020-08-27 RX ADMIN — METHYLPREDNISOLONE ACETATE 80 MG: 80 INJECTION, SUSPENSION INTRA-ARTICULAR; INTRALESIONAL; INTRAMUSCULAR; SOFT TISSUE at 09:11

## 2020-08-27 NOTE — PROGRESS NOTES
willowPatient: Agnes Hayes  YOB: 1970 50 y.o. female  Medical Record Number: [id number]    Chief Complaints:   Chief Complaint   Patient presents with   • Right Knee - Pain, Initial Evaluation   • Left Knee - Pain, Initial Evaluation       History of Present Illness:Agnes Hayes is a 50 y.o. female who presents as a new patient both myself as well as to the practice with complaints of bilateral knee pain that started years ago.  She reports right knee is worse than the left.  She describes the knee pain as a moderate to severe intermittent stabbing type pain with clicking, worse with going up and down stairs and getting in and out of a chair.  Pain is only slightly better with rest.    Allergies:   Allergies   Allergen Reactions   • Bactrim [Sulfamethoxazole-Trimethoprim] Diarrhea     patient states she does not want to ever take Bactrim again       Medications:   Current Outpatient Medications   Medication Sig Dispense Refill   • aspirin 81 MG EC tablet Take 81 mg by mouth daily.     • B Complex-C-E-Zn (B COMPLEX-C-E-ZINC) tablet Take 1 tablet by mouth daily.     • cephalexin (KEFLEX) 500 MG capsule Take 1 capsule by mouth As Needed (after intercourse). 30 capsule 3   • cetirizine (ZyrTEC) 10 MG tablet Take 10 mg by mouth daily.     • cholecalciferol (VITAMIN D3) 1000 UNITS tablet Take 1,000 Units by mouth daily.     • losartan (COZAAR) 50 MG tablet Take 1 tablet by mouth once daily 90 tablet 0   • multivitamin (DAILY IRENE) tablet tablet Take 1 tablet by mouth daily.     • Omega-3 Fatty Acids (FISH OIL) 1000 MG capsule capsule Take 1,000 mg by mouth daily with breakfast.     • AZO-CRANBERRY PO Take  by mouth.       No current facility-administered medications for this visit.          The following portions of the patient's history were reviewed and updated as appropriate: allergies, current medications, past family history, past medical history, past social history, past surgical history and problem  "list.    Review of Systems:   A 14 point review of systems was performed. All systems negative except pertinent positives/negative listed in HPI above    Physical Exam:   Vitals:    08/27/20 0840   Temp: 96.8 °F (36 °C)   TempSrc: Temporal   Weight: 71.2 kg (157 lb)   Height: 167.6 cm (65.98\")   PainSc:   7   PainLoc: Knee       General: A and O x 3, ASA, NAD    SCLERA:    Normal    DENTITION:   Normal  Skin clear no unusual lesions noted  Bilateral knees patient has trace amount of effusion noted with 110 degrees flexion neutral and extension with significant patellofemoral crepitus noted.  Negative Lockman negative Thao calf soft and nontender       Radiology:  Xrays 3views (ap,lateral, sunrise) right knee were ordered and reviewed today secondary to pain and show significant arthritic changes noted of bilateral patellofemoral compartments.  No compared to views available    Assessment/Plan: Osteoarthritis bilateral knees    Patient discussed treatment options, we will proceed with bilateral knee cortisone injections.  Prior to injections risks were discussed including pain infection.  Patient verbalized understanding would like to proceed with injections I gave her information on Synvisc 1 that may be a great option for her in the future.  She was referred to outpatient physical therapy and I will see her back as needed      Mary Anne Carrillo, APRN  8/27/2020  Large Joint Arthrocentesis: R knee  Date/Time: 8/27/2020 9:10 AM  Consent given by: patient  Site marked: site marked  Timeout: Immediately prior to procedure a time out was called to verify the correct patient, procedure, equipment, support staff and site/side marked as required   Supporting Documentation  Indications: pain   Procedure Details  Location: knee - R knee  Preparation: Patient was prepped and draped in the usual sterile fashion  Needle gauge: 21g.  Approach: lateral  Medications administered: 2 mL lidocaine (cardiac); 80 mg " methylPREDNISolone acetate 80 MG/ML  Patient tolerance: patient tolerated the procedure well with no immediate complications    Large Joint Arthrocentesis: L knee  Date/Time: 8/27/2020 9:11 AM  Consent given by: patient  Site marked: site marked  Timeout: Immediately prior to procedure a time out was called to verify the correct patient, procedure, equipment, support staff and site/side marked as required   Supporting Documentation  Indications: pain   Procedure Details  Location: knee - L knee  Preparation: Patient was prepped and draped in the usual sterile fashion  Needle gauge: 21g.  Approach: lateral  Medications administered: 2 mL lidocaine (cardiac); 80 mg methylPREDNISolone acetate 80 MG/ML  Patient tolerance: patient tolerated the procedure well with no immediate complications

## 2020-08-31 NOTE — PROGRESS NOTES
Called and notified that her Pap was normal.  There were some benign endometrial cells noted on the Pap, but she is having regular, normal menses and so is not concerning    Karen Arredondo MD  8/31/2020  10:25

## 2020-09-04 ENCOUNTER — HOSPITAL ENCOUNTER (OUTPATIENT)
Dept: PHYSICAL THERAPY | Facility: HOSPITAL | Age: 50
Setting detail: THERAPIES SERIES
Discharge: HOME | End: 2020-09-04

## 2020-09-04 DIAGNOSIS — M25.561 CHRONIC PAIN OF BOTH KNEES: Primary | ICD-10-CM

## 2020-09-04 DIAGNOSIS — M25.562 CHRONIC PAIN OF BOTH KNEES: Primary | ICD-10-CM

## 2020-09-04 DIAGNOSIS — G89.29 CHRONIC PAIN OF BOTH KNEES: Primary | ICD-10-CM

## 2020-09-04 PROCEDURE — 97162 PT EVAL MOD COMPLEX 30 MIN: CPT | Performed by: PHYSICAL THERAPIST

## 2020-09-04 PROCEDURE — 97110 THERAPEUTIC EXERCISES: CPT | Performed by: PHYSICAL THERAPIST

## 2020-09-05 DIAGNOSIS — I10 HYPERTENSION, BENIGN: ICD-10-CM

## 2020-09-05 NOTE — THERAPY EVALUATION
Outpatient Physical Therapy Ortho Initial Evaluation  [facility]     Patient Name: Agnes Hayes  : 1970  MRN: [id number]  Today's Date: 2020      Visit Date: 2020    Patient Active Problem List   Diagnosis   • Hypertension   • Hyperlipidemia   • Slow transit constipation   • Family history of heart disease   • Recurrent UTI   • Incontinence in female   • Hemorrhoids   • Weight gain   • Family history of diabetes mellitus        Past Medical History:   Diagnosis Date   • Gestational diabetes mellitus    • History of endometrial ablation    • Hyperlipidemia    • Hypertension    • Leaking of urine    • Migraine    • UTI (urinary tract infection)         Past Surgical History:   Procedure Laterality Date   • BUNIONECTOMY     • CHOLECYSTECTOMY     • TUBAL ABDOMINAL LIGATION         Visit Dx:     ICD-10-CM ICD-9-CM   1. Chronic pain of both knees M25.561 719.46    M25.562 338.29    G89.29          Patient History     Row Name 20 0900             History    Chief Complaint  Difficulty Walking;Difficulty with daily activities;Fatigue/poor endurance;Joint stiffness;Muscle tenderness;Muscle weakness;Pain  -CJ      Type of Pain  Knee pain  -CJ      Previous treatment for THIS PROBLEM  Injections  -CJ      Patient/Caregiver Goals  Relieve pain;Improve strength;Know what to do to help the symptoms  -CJ      Current Tobacco Use  none  -CJ      Patient's Rating of General Health  Good  -CJ      What clinical tests have you had for this problem?  X-ray  -CJ      Are you or can you be pregnant  No  -CJ         Pain     Pain Location  Knee  -CJ      Pain at Present  4  -CJ      Pain at Best  2  -CJ      Pain at Worst  6  -CJ      Pain Frequency  Constant/continuous  -CJ      Pain Description  Aching;Sharp  -CJ      Is your sleep disturbed?  No  -CJ      Difficulties at work?  painful but able to complete all activities  -CJ      Difficulties with ADL's?  painful but able  -CJ      Difficulties  with recreational activities?  unable to walk for extended periods of time or run  -CJ         Fall Risk Assessment    Any falls in the past year:  No  -CJ         Services    Prior Rehab/Home Health Experiences  No  -CJ         Daily Activities    Primary Language  English  -CJ      Are you able to read  Yes  -CJ      Are you able to write  Yes  -CJ      How does patient learn best?  Listening;Demonstration;Pictures/Video  -CJ      Teaching needs identified  Home Exercise Program;Management of Condition  -CJ      Patient is concerned about/has problems with  Performing home management (household chores, shopping, care of dependents);Performing job responsibilities/community activities (work, school,;Performing sports, recreation, and play activities;Walking  -CJ      Does patient have problems with the following?  None  -CJ      Barriers to learning  None  -CJ      Pt Participated in POC and Goals  Yes  -CJ         Safety    Are you being hurt, hit, or frightened by anyone at home or in your life?  No  -CJ      Are you being neglected by a caregiver  No  -CJ        User Key  (r) = Recorded By, (t) = Taken By, (c) = Cosigned By    Initials Name Provider Type    Bert Ward, PT Physical Therapist          PT Ortho     Row Name 09/05/20 0900       Knee Special Tests    Valgus stress (MCL lesion)  Bilateral:;Negative  -CJ    Varus stress (LCL lesion)  Bilateral:;Negative  -CJ    Thao’s test (meniscal lesion)  Bilateral:;Negative  -CJ       General ROM    GENERAL ROM COMMENTS  0-130 B AROM of Knee  -CJ       MMT Right Lower Ext    Rt Hip Flexion MMT, Gross Movement  (4+/5) good plus  -CJ    Rt Hip Extension MMT, Gross Movement  (4-/5) good minus  -CJ    Rt Hip ABduction MMT, Gross Movement  (4-/5) good minus  -CJ    Rt Knee Extension MMT, Gross Movement  (4+/5) good plus  -CJ    Rt Knee Flexion MMT, Gross Movement  (4-/5) good minus  -CJ       MMT Left Lower Ext    Lt Hip Flexion MMT, Gross Movement  (4+/5)  good plus  -CJ    Lt Hip Extension MMT, Gross Movement  (4-/5) good minus  -CJ    Lt Hip ABduction MMT, Gross Movement  (4-/5) good minus  -CJ    Lt Knee Extension MMT, Gross Movement  (4/5) good  -CJ    Lt Knee Flexion MMT, Gross Movement  (4/5) good  -CJ       Lower Extremity Flexibility    Hamstrings  Bilateral:;Moderately limited  -CJ    Quadriceps  Bilateral:;Moderately limited  -CJ    ITB  Bilateral:;Moderately limited  -CJ    Hip External Rotators  Bilateral:;Mildly limited  -CJ    Hip Internal Rotators  Bilateral:;Moderately limited  -CJ    Gastrocnemius  Bilateral:;Mildly limited  -CJ       Balance Skills Training    Balance Comments  unable to perform single limb squat  -CJ       Gait/Stairs Assessment/Training    Comment (Gait/Stairs)  No signficant gait deviations noted. patient reported it was her baseline  -      User Key  (r) = Recorded By, (t) = Taken By, (c) = Cosigned By    Initials Name Provider Type    Bert Ward, PT Physical Therapist                      Therapy Education  Education Details: Findings from initial evaluation; plan for therapy sessions, Kinesiotaping side effects  Given: HEP, Symptoms/condition management, Pain management  Program: New  How Provided: Verbal, Demonstration, Written  Provided to: Patient  Level of Understanding: Teach back education performed, Verbalized     PT OP Goals     Row Name 09/05/20 0900          PT Short Term Goals    STG 1  Patient will be independent and compliant with initial home exercise program.  -     STG 2  Patient will be independent with education for symptom management, posture, body mechanics, and strategies to minimize stress on affected tissues  -        Long Term Goals    LTG 1  Patient will be independent and compliant with advanced home exercise program to facilitate self-management of symptoms once discharged from formal therapy.  -     LTG 2  Patient will demonstrate B 5/5 knee strength and grossly 4/5 hip strength for  improved function and ease with extended ambulation activities.  -     LTG 3  Patient will report improvement in KOS functional outcome measure to at least 60/80 for improved functionality with all daily activities  -     LTG 4  Pt. will report B knee pain </= 1-2/10 with all daily activities.   -       User Key  (r) = Recorded By, (t) = Taken By, (c) = Cosigned By    Initials Name Provider Type    Bert Ward, PT Physical Therapist          PT Assessment/Plan     Row Name 09/05/20 0951          PT Assessment    Functional Limitations  Limitation in home management;Limitations in community activities;Limitations in functional capacity and performance;Performance in sport activities;Performance in leisure activities  -     Impairments  Impaired flexibility;Pain;Muscle strength;Impaired muscle endurance;Impaired postural alignment  -     Assessment Comments  Agnes Hayes is a 50 y.o. year-old female referred to physical therapy for B knee pain. She presents with a evolving clinical presentation.  She has comorbidities of chronic B knee pain/OA and personal factors of sedentary lifestyle and seated work environment that may affect her progress in the plan of care. Self scored disability measure of KOS was a  42/80, where 80/80 = no deficits. She demonstrated patella chelsea (and slightly lateral) positioning (L > R), proximal hip and HS weakness with MMT, BLE impaired flexibility, and inability to perform single limb squat due to medial collapse and instability.  Signs and symptoms are consistent with referring diagnosis. She received cortisone injections last week which has helped a little bit. She is appropriate for skilled therapy services at this time to address deficits and improve ease with walking, stair climbing, and general quality of life.  -     Please refer to paper survey for additional self-reported information  Yes  -     Rehab Potential  Good  -     Patient/caregiver participated in  establishment of treatment plan and goals  Yes  -CJ     Patient would benefit from skilled therapy intervention  Yes  -CJ        PT Plan    PT Frequency  2x/week  -     Predicted Duration of Therapy Intervention (Therapy Eval)  12 weeks  -     Planned CPT's?  PT EVAL MOD COMPLELITY: 33515;PT RE-EVAL: 44656;PT THER PROC EA 15 MIN: 30918;PT MANUAL THERAPY EA 15 MIN: 89896;PT THER ACT EA 15 MIN: 93217;PT SELF CARE/HOME MGMT/TRAIN EA 15: 24277;PT HOT OR COLD PACK TREAT MCARE;PT ELECTRICAL STIM UNATTEND: ;PT ULTRASOUND EA 15 MIN: 99746  -     PT Plan Comments  Assess response to kinesiotaping, proximal hip/quad/HS strengthening, Recumbent bike for warm up. HS, calf, quad, and ITBand stretching.  -       User Key  (r) = Recorded By, (t) = Taken By, (c) = Cosigned By    Initials Name Provider Type    Bert Ward, PT Physical Therapist            OP Exercises     Row Name 09/05/20 0900             Total Minutes    86631 - PT Therapeutic Exercise Minutes  20  -CJ         Exercise 1    Exercise Name 1  Quad set  -CJ      Reps 1  10  -CJ      Time 1  5  -CJ         Exercise 2    Exercise Name 2  Bridge with adduction  -CJ      Reps 2  15  -CJ         Exercise 3    Exercise Name 3  S/L clamshell  -CJ      Reps 3  15  -CJ         Exercise 4    Exercise Name 4  Longsitting HS stretch  -CJ      Reps 4  2  -CJ      Time 4  20  -CJ         Exercise 5    Exercise Name 5  Kinesiotaping trial to B knees for support  -      Additional Comments  2 I strips anchored at tibial tuberosity, 50% pull distal to proximal  -        User Key  (r) = Recorded By, (t) = Taken By, (c) = Cosigned By    Initials Name Provider Type    Bert Ward, PT Physical Therapist                        Outcome Measure Options: Knee Outcome Score- ADL  Knee Outcome Score  Knee Outcome Score Comments: 42/80      Time Calculation:     Start Time: 1530  Stop Time: 1615  Time Calculation (min): 45 min  Total Timed Code Minutes- PT: 20  minute(s)     Therapy Charges for Today     Code Description Service Date Service Provider Modifiers Qty    13418184791  PT THER PROC EA 15 MIN 9/4/2020 Bert Dalton, PT GP 1    [id number]  PT EVAL MOD COMPLEXITY 2 9/4/2020 Bert Dalton, PT GP 1          PT G-Codes  Outcome Measure Options: Knee Outcome Score- ADL         Bert Dalton, PT  9/5/2020

## 2020-09-08 ENCOUNTER — OFFICE VISIT (OUTPATIENT)
Dept: SURGERY | Facility: CLINIC | Age: 50
End: 2020-09-08

## 2020-09-08 VITALS
BODY MASS INDEX: 25.3 KG/M2 | DIASTOLIC BLOOD PRESSURE: 80 MMHG | HEIGHT: 66 IN | OXYGEN SATURATION: 99 % | HEART RATE: 68 BPM | WEIGHT: 157.4 LBS | SYSTOLIC BLOOD PRESSURE: 136 MMHG | TEMPERATURE: 97.9 F

## 2020-09-08 DIAGNOSIS — K64.4 ANAL SKIN TAG: ICD-10-CM

## 2020-09-08 DIAGNOSIS — K64.8 INTERNAL HEMORRHOIDS WITH COMPLICATION: ICD-10-CM

## 2020-09-08 DIAGNOSIS — Z12.11 SCREENING FOR COLORECTAL CANCER: Primary | ICD-10-CM

## 2020-09-08 DIAGNOSIS — Z12.12 SCREENING FOR COLORECTAL CANCER: Primary | ICD-10-CM

## 2020-09-08 PROCEDURE — 99244 OFF/OP CNSLTJ NEW/EST MOD 40: CPT | Performed by: COLON & RECTAL SURGERY

## 2020-09-08 PROCEDURE — 46600 DIAGNOSTIC ANOSCOPY SPX: CPT | Performed by: COLON & RECTAL SURGERY

## 2020-09-08 RX ORDER — SODIUM CHLORIDE, SODIUM LACTATE, POTASSIUM CHLORIDE, CALCIUM CHLORIDE 600; 310; 30; 20 MG/100ML; MG/100ML; MG/100ML; MG/100ML
30 INJECTION, SOLUTION INTRAVENOUS CONTINUOUS
Status: CANCELLED | OUTPATIENT
Start: 2020-10-01

## 2020-09-08 RX ORDER — HYDROCORTISONE 25 MG/G
CREAM TOPICAL
Qty: 30 G | Refills: 1 | Status: SHIPPED | OUTPATIENT
Start: 2020-09-08 | End: 2021-11-04

## 2020-09-08 RX ORDER — LOSARTAN POTASSIUM 50 MG/1
TABLET ORAL
Qty: 90 TABLET | Refills: 1 | Status: SHIPPED | OUTPATIENT
Start: 2020-09-08 | End: 2021-02-26 | Stop reason: SDUPTHER

## 2020-09-08 NOTE — PROGRESS NOTES
"Agnes Hayes is a 50 y.o. female who is seen as a consult at the request of Sophia Sullivan MD for Consult; Hemorrhoids; and Constipation.      HPI:  Works in [employer] at [employer]  Hem had a throm hem lanced several years ago at a general surgeon's office  With bm she feels like there is tissue \"in the way\"  Has issues with burning and itching about once a month and she will use tucks to help.  She says she does not have to strain for bowel movement  On the review of systems she checked constipation and when asked patient states she feels that normal is daily and she is not always daily  No rb  bm - most days   Fiber -no  Ss- no    otc cream - no    Past Medical History:   Diagnosis Date   • Abnormal mammogram 07/2018   • Bilateral bunions    • Bilateral chronic knee pain    • Breast asymmetry    • Constipation    • Cystitis 01/2017   • Elevated glucose 07/2019   • Gestational diabetes mellitus     WITH BOTH PREGNANCIES   • Hemorrhoids    • History of recurrent UTIs 08/2017    FOLLOWED BY DR. STANLEY MILLER   • Hyperlipidemia    • Hypertension    • Left-sided thoracic back pain 07/2019   • Migraine    • MITCH (stress urinary incontinence, female)        Past Surgical History:   Procedure Laterality Date   • BUNIONECTOMY Right 2008   • BUNIONECTOMY Left 2010    DR. BONITA RICHARDSON   • CHOLECYSTECTOMY N/A 2003    DR. TONI GARCIA   • CYSTOSCOPY N/A 08/2017    DR. STANLEY MILLER   • ENDOMETRIAL ABLATION N/A 2007   • ENDOSCOPY N/A 09/08/2009    CHRONIC ACTIVE GASTRITIS, DR. GLORY PACHECO AT [facility]   • TUBAL ABDOMINAL LIGATION Bilateral 2002   • VAGINAL DELIVERY N/A 04/16/2002    DR. KOSTA KATZ AT [facility]   • VAGINAL DELIVERY N/A 06/1999       Social History:   reports that she has never smoked. She has never used smokeless tobacco. She reports that she does not drink alcohol or use drugs.      Marriage status:     Family History   Problem Relation Age of Onset   • Heart attack Mother    • Stroke Mother    • " Hypertension Mother    • Diabetes Mother    • Heart attack Father    • Hypertension Father    • Stroke Father    • Schizophrenia Brother    • Mental illness Brother    • Breast cancer Neg Hx    • Ovarian cancer Neg Hx    • Uterine cancer Neg Hx    • Colon cancer Neg Hx          Current Outpatient Medications:   •  aspirin 81 MG EC tablet, Take 81 mg by mouth daily., Disp: , Rfl:   •  B Complex-C-E-Zn (B COMPLEX-C-E-ZINC) tablet, Take 1 tablet by mouth daily., Disp: , Rfl:   •  cephalexin (KEFLEX) 500 MG capsule, Take 1 capsule by mouth As Needed (after intercourse)., Disp: 30 capsule, Rfl: 3  •  cetirizine (ZyrTEC) 10 MG tablet, Take 10 mg by mouth daily., Disp: , Rfl:   •  cholecalciferol (VITAMIN D3) 1000 UNITS tablet, Take 1,000 Units by mouth daily., Disp: , Rfl:   •  multivitamin (DAILY IRENE) tablet tablet, Take 1 tablet by mouth daily., Disp: , Rfl:   •  Omega-3 Fatty Acids (FISH OIL) 1000 MG capsule capsule, Take 1,000 mg by mouth daily with breakfast., Disp: , Rfl:   •  Hydrocortisone, Perianal, (Anusol-HC) 2.5 % rectal cream, Apply rectally 3 times daily.  Include applicator., Disp: 30 g, Rfl: 1  •  losartan (COZAAR) 50 MG tablet, TAKE 1 TABLET BY MOUTH ONCE DAILY . APPOINTMENT REQUIRED FOR FUTURE REFILLS, Disp: 90 tablet, Rfl: 1    Allergy  Bactrim [sulfamethoxazole-trimethoprim]    Review of Systems   Constitution: Negative for decreased appetite and weight gain.   HENT: Negative for congestion, hearing loss and hoarse voice.    Eyes: Negative for blurred vision, discharge and visual disturbance.   Cardiovascular: Negative for chest pain, cyanosis and leg swelling.   Respiratory: Negative for cough, shortness of breath, sleep disturbances due to breathing and snoring.    Endocrine: Negative for cold intolerance and heat intolerance.   Hematologic/Lymphatic: Does not bruise/bleed easily.   Skin: Negative for itching, poor wound healing and skin cancer.   Musculoskeletal: Positive for arthritis and joint  pain. Negative for back pain and joint swelling.   Gastrointestinal: Positive for constipation. Negative for abdominal pain, change in bowel habit and bowel incontinence.   Genitourinary: Positive for bladder incontinence. Negative for dysuria and hematuria.   Neurological: Negative for brief paralysis, excessive daytime sleepiness, dizziness, focal weakness, headaches, light-headedness and weakness.   Psychiatric/Behavioral: Negative for altered mental status and hallucinations. The patient does not have insomnia.    Allergic/Immunologic: Negative for HIV exposure and persistent infections.   All other systems reviewed and are negative.      Vitals:    09/08/20 0934   BP: 136/80   Pulse: 68   Temp: 97.9 °F (36.6 °C)   SpO2: 99%     Body mass index is 25.41 kg/m².    Physical Exam   Constitutional: She is oriented to person, place, and time. She appears well-developed and well-nourished. No distress.   HENT:   Head: Normocephalic and atraumatic.   Nose: Nose normal.   Mouth/Throat: Oropharynx is clear and moist.   Eyes: Pupils are equal, round, and reactive to light. Conjunctivae and EOM are normal.   Neck: Normal range of motion. No tracheal deviation present.   Pulmonary/Chest: Effort normal and breath sounds normal. No respiratory distress.   Abdominal: Soft. Bowel sounds are normal. She exhibits no distension.   Genitourinary:   Genitourinary Comments: Perianal exam: external hem - minor tag with excoriation  MAGDA- good tone, no masses  Anoscopy performed:  Grade 2 x 2 internal hem     Musculoskeletal: Normal range of motion. She exhibits no edema or deformity.   Neurological: She is alert and oriented to person, place, and time. No cranial nerve deficit. Coordination and gait normal.   Skin: Skin is warm and dry.   Psychiatric: She has a normal mood and affect. Her behavior is normal. Judgment normal.         Assessment:  1. Screening for colorectal cancer    2. Internal hemorrhoids with complication    3. Anal  skin tag        Plan: Educated patient on the difference between a tag that is irritated and a hemorrhoid.  She does not need hemorrhoid surgery at this time.  For the excoriation she needs a barrier cream.  The tag does not be removed since it is very small.  It will not bother her once the skin is in better condition.  We talked about treatment for hemorrhoids and first-line therapy is fiber.  I gave her handout on how to achieve 30 to 40 g daily.  For the internal hemorrhoids I prescribed hydrocortisone cream to be placed internally.  For colon cancer screening I recommend colonoscopy.

## 2020-09-16 ENCOUNTER — HOSPITAL ENCOUNTER (OUTPATIENT)
Dept: PHYSICAL THERAPY | Facility: HOSPITAL | Age: 50
Setting detail: THERAPIES SERIES
Discharge: HOME | End: 2020-09-16

## 2020-09-16 DIAGNOSIS — M25.561 CHRONIC PAIN OF BOTH KNEES: Primary | ICD-10-CM

## 2020-09-16 DIAGNOSIS — G89.29 CHRONIC PAIN OF BOTH KNEES: Primary | ICD-10-CM

## 2020-09-16 DIAGNOSIS — M25.562 CHRONIC PAIN OF BOTH KNEES: Primary | ICD-10-CM

## 2020-09-16 PROCEDURE — 97110 THERAPEUTIC EXERCISES: CPT

## 2020-09-16 NOTE — THERAPY TREATMENT NOTE
Outpatient Physical Therapy Ortho Treatment Note  [facility]     Patient Name: Agnes Hayes  : 1970  MRN: [id number]  Today's Date: 2020      Visit Date: 2020    Visit Dx:    ICD-10-CM ICD-9-CM   1. Chronic pain of both knees  M25.561 719.46    M25.562 338.29    G89.29        Patient Active Problem List   Diagnosis   • Hypertension   • Hyperlipidemia   • Slow transit constipation   • Family history of heart disease   • Recurrent UTI   • Incontinence in female   • Hemorrhoids   • Weight gain   • Family history of diabetes mellitus   • Constipation   • Screening for colorectal cancer        Past Medical History:   Diagnosis Date   • Abnormal mammogram 2018   • Bilateral bunions    • Bilateral chronic knee pain    • Breast asymmetry    • Constipation    • Cystitis 2017   • Elevated glucose 2019   • Gestational diabetes mellitus     WITH BOTH PREGNANCIES   • Hemorrhoids    • History of recurrent UTIs 2017    FOLLOWED BY DR. STANLEY MILLER   • Hyperlipidemia    • Hypertension    • Left-sided thoracic back pain 2019   • Migraine    • MITCH (stress urinary incontinence, female)         Past Surgical History:   Procedure Laterality Date   • BUNIONECTOMY Right    • BUNIONECTOMY Left     DR. BONITA RICHARDSON   • CHOLECYSTECTOMY N/A     DR. TONI GARCIA   • CYSTOSCOPY N/A 2017    DR. STANLEY MILLER   • ENDOMETRIAL ABLATION N/A    • ENDOSCOPY N/A 2009    CHRONIC ACTIVE GASTRITIS, DR. GLORY PACHECO AT [facility]   • TUBAL ABDOMINAL LIGATION Bilateral    • VAGINAL DELIVERY N/A 2002    DR. KOSTA KATZ AT [place]   • VAGINAL DELIVERY N/A 1999       PT Ortho     Row Name 20 1600       Subjective Comments    Subjective Comments  Knees feel better after initial visit.  currently left knee pain greater than right.  -SI       Subjective Pain    Able to rate subjective pain?  yes  -SI    Pre-Treatment Pain Level  2  -SI      User Key  (r) = Recorded By, (t) = Taken By, (c)  = Cosigned By    Initials Name Provider Type    Meche Cortes PTA Physical Therapy Assistant                      PT Assessment/Plan     Row Name 09/16/20 1713          PT Assessment    Assessment Comments  KT made knees feel good initial visit and pt enters today wearing KT she purchased.  See ex increase and tolerated well in PT  -SI       User Key  (r) = Recorded By, (t) = Taken By, (c) = Cosigned By    Initials Name Provider Type    Meche Cortes PTA Physical Therapy Assistant            OP Exercises     Row Name 09/16/20 1600             Subjective Comments    Subjective Comments  Knees feel better after initial visit.  currently left knee pain greater than right.  -SI         Subjective Pain    Able to rate subjective pain?  yes  -SI      Pre-Treatment Pain Level  2  -SI         Total Minutes    95768 - PT Therapeutic Exercise Minutes  45  -SI         Exercise 1    Exercise Name 1  Quad set  -SI      Reps 1  20  -SI      Time 1  5  -SI         Exercise 2    Exercise Name 2  Bridge with adduction  -SI      Sets 2  2  -SI      Reps 2  10  -SI         Exercise 3    Exercise Name 3  S/L clamshell  -SI      Sets 3  2  -SI      Reps 3  10  -SI      Additional Comments  RTB  -SI         Exercise 4    Exercise Name 4  Longsitting HS stretch  -SI      Reps 4  2  -SI      Time 4  20  -SI         Exercise 5    Exercise Name 5  Kinesiotaping trial to B knees for support  -SI         Exercise 6    Exercise Name 6  SLR (foot neutral, then slight ER )  -SI      Sets 6  2  -SI      Reps 6  10  -SI         Exercise 7    Exercise Name 7  prone hip extension  -SI      Sets 7  2  -SI      Reps 7  10  -SI      Additional Comments  then do 5 knee bends (purpose  is just  a little stretch to quads)  -SI        User Key  (r) = Recorded By, (t) = Taken By, (c) = Cosigned By    Initials Name Provider Type    Meche Cortes PTA Physical Therapy Assistant                           Therapy Education  Education Details:  increase reps to 2 sets of 10 on all ex.  HEP daily  Given: HEP, Symptoms/condition management              Time Calculation:   Start Time: 0420  Stop Time: 0515  Time Calculation (min): 55 min  Total Timed Code Minutes- PT: 45 minute(s)  Therapy Charges for Today     Code Description Service Date Service Provider Modifiers Qty    26854149027 HC PT THER PROC EA 15 MIN 9/16/2020 Meche Light PTA GP 3                    Meche Light PTA  9/16/2020

## 2020-09-18 ENCOUNTER — HOSPITAL ENCOUNTER (OUTPATIENT)
Dept: PHYSICAL THERAPY | Facility: HOSPITAL | Age: 50
Setting detail: THERAPIES SERIES
Discharge: HOME | End: 2020-09-18

## 2020-09-18 DIAGNOSIS — M25.561 CHRONIC PAIN OF BOTH KNEES: Primary | ICD-10-CM

## 2020-09-18 DIAGNOSIS — M25.562 CHRONIC PAIN OF BOTH KNEES: Primary | ICD-10-CM

## 2020-09-18 DIAGNOSIS — G89.29 CHRONIC PAIN OF BOTH KNEES: Primary | ICD-10-CM

## 2020-09-18 PROCEDURE — 97110 THERAPEUTIC EXERCISES: CPT | Performed by: PHYSICAL THERAPIST

## 2020-09-18 NOTE — THERAPY TREATMENT NOTE
Outpatient Physical Therapy Ortho Treatment Note  [facility]     Patient Name: Agnes Hayes  : 1970  MRN: [id number]  Today's Date: 2020      Visit Date: 2020    Visit Dx:    ICD-10-CM ICD-9-CM   1. Chronic pain of both knees  M25.561 719.46    M25.562 338.29    G89.29        Patient Active Problem List   Diagnosis   • Hypertension   • Hyperlipidemia   • Slow transit constipation   • Family history of heart disease   • Recurrent UTI   • Incontinence in female   • Hemorrhoids   • Weight gain   • Family history of diabetes mellitus   • Constipation   • Screening for colorectal cancer        Past Medical History:   Diagnosis Date   • Abnormal mammogram 2018   • Bilateral bunions    • Bilateral chronic knee pain    • Breast asymmetry    • Constipation    • Cystitis 2017   • Elevated glucose 2019   • Gestational diabetes mellitus     WITH BOTH PREGNANCIES   • Hemorrhoids    • History of recurrent UTIs 2017    FOLLOWED BY DR. STANLEY MILLER   • Hyperlipidemia    • Hypertension    • Left-sided thoracic back pain 2019   • Migraine    • MITCH (stress urinary incontinence, female)         Past Surgical History:   Procedure Laterality Date   • BUNIONECTOMY Right    • BUNIONECTOMY Left     DR. BONITA RICHARDSON   • CHOLECYSTECTOMY N/A     DR. TONI GARCIA   • CYSTOSCOPY N/A 2017    DR. STANLEY MILLER   • ENDOMETRIAL ABLATION N/A    • ENDOSCOPY N/A 2009    CHRONIC ACTIVE GASTRITIS, DR. GLORY PACHECO AT [facility]   • TUBAL ABDOMINAL LIGATION Bilateral    • VAGINAL DELIVERY N/A 2002    DR. KOSTA KATZ AT [place]   • VAGINAL DELIVERY N/A 1999                       PT Assessment/Plan     Row Name 20 0927          PT Assessment    Assessment Comments  Progressed strengthening program today with no immediate adverse effects. Did not add to HEP just yet. Want to ensure proper understanding with more instruction next session prior to adding.  -CJ        PT Plan    PT Plan  Comments  change quad set to CKC, ball on wall.  -CJ       User Key  (r) = Recorded By, (t) = Taken By, (c) = Cosigned By    Initials Name Provider Type    CJ Bert Dalton, PT Physical Therapist            OP Exercises     Row Name 09/18/20 1600             Subjective Comments    Subjective Comments  I had an emergency today and had to take the stairs. I took two flights. It wasnt terrible.  -CJ         Subjective Pain    Able to rate subjective pain?  yes  -CJ      Pre-Treatment Pain Level  0  -CJ      Post-Treatment Pain Level  0  -CJ         Total Minutes    28035 - PT Therapeutic Exercise Minutes  45  -CJ         Exercise 1    Exercise Name 1  Quad set  -CJ      Reps 1  10  -CJ      Time 1  10  -CJ      Additional Comments  bilateral  -CJ         Exercise 2    Exercise Name 2  Bridge with adduction  -CJ      Sets 2  2  -CJ      Reps 2  10  -CJ      Time 2  3 sec hold time  -CJ         Exercise 3    Exercise Name 3  S/L clamshell  -CJ      Sets 3  2  -CJ      Reps 3  10  -CJ      Additional Comments  RTB, bilateral  -CJ         Exercise 4    Exercise Name 4  Standing HS stretch  -CJ      Reps 4  3 B  -CJ      Time 4  20  -CJ         Exercise 5    Exercise Name 5  Kinesiotaping trial to B knees for support  -CJ      Reps 5  5 min, B  -CJ         Exercise 6    Exercise Name 6  SLR (foot neutral, then slight ER )  -CJ      Sets 6  2  -CJ      Reps 6  10  -CJ         Exercise 7    Exercise Name 7  prone hip extension  -CJ      Sets 7  2  -CJ      Reps 7  10  -CJ         Exercise 8    Exercise Name 8  Recumbent bike  -CJ      Time 8  5 min  -CJ      Additional Comments  L2, seat 5  -CJ         Exercise 9    Exercise Name 9  calf stretch off edge of step  -CJ      Reps 9  3  -CJ      Time 9  20  -CJ         Exercise 10    Exercise Name 10  LAQ, #2  -CJ      Reps 10  10  -CJ      Time 10  5  -CJ      Additional Comments  #2  -CJ         Exercise 11    Exercise Name 11  Prone quad stretch, B  -CJ      Reps 11  3  -CJ       Time 11  15  -         Exercise 12    Exercise Name 12  SAQ, B  -      Reps 12  10  -      Time 12  5  -CJ      Additional Comments  #2  -         Exercise 13    Exercise Name 13  HS cursai, B  -      Reps 13  20  -      Additional Comments  #2  -        User Key  (r) = Recorded By, (t) = Taken By, (c) = Cosigned By    Initials Name Provider Type     Bert Dalton, PT Physical Therapist                                          Time Calculation:   Start Time: 1615  Stop Time: 1700  Time Calculation (min): 45 min  Total Timed Code Minutes- PT: 45 minute(s)  Therapy Charges for Today     Code Description Service Date Service Provider Modifiers Qty    05374741051 HC PT THER PROC EA 15 MIN 9/18/2020 Bert Dalton, PT GP 3                    Bert Dalton PT  9/18/2020

## 2020-09-23 ENCOUNTER — HOSPITAL ENCOUNTER (OUTPATIENT)
Dept: PHYSICAL THERAPY | Facility: HOSPITAL | Age: 50
Setting detail: THERAPIES SERIES
Discharge: HOME | End: 2020-09-23

## 2020-09-23 DIAGNOSIS — M25.561 CHRONIC PAIN OF BOTH KNEES: Primary | ICD-10-CM

## 2020-09-23 DIAGNOSIS — G89.29 CHRONIC PAIN OF BOTH KNEES: Primary | ICD-10-CM

## 2020-09-23 DIAGNOSIS — M25.562 CHRONIC PAIN OF BOTH KNEES: Primary | ICD-10-CM

## 2020-09-23 PROCEDURE — 97110 THERAPEUTIC EXERCISES: CPT

## 2020-09-23 NOTE — THERAPY TREATMENT NOTE
Outpatient Physical Therapy Ortho Treatment Note  [facility]     Patient Name: Agnes Hayes  : 1970  MRN: [id number]  Today's Date: 2020      Visit Date: 2020    Visit Dx:    ICD-10-CM ICD-9-CM   1. Chronic pain of both knees  M25.561 719.46    M25.562 338.29    G89.29        Patient Active Problem List   Diagnosis   • Hypertension   • Hyperlipidemia   • Slow transit constipation   • Family history of heart disease   • Recurrent UTI   • Incontinence in female   • Hemorrhoids   • Weight gain   • Family history of diabetes mellitus   • Constipation   • Screening for colorectal cancer        Past Medical History:   Diagnosis Date   • Abnormal mammogram 2018   • Bilateral bunions    • Bilateral chronic knee pain    • Breast asymmetry    • Constipation    • Cystitis 2017   • Elevated glucose 2019   • Gestational diabetes mellitus     WITH BOTH PREGNANCIES   • Hemorrhoids    • History of recurrent UTIs 2017    FOLLOWED BY DR. STANLEY MILLER   • Hyperlipidemia    • Hypertension    • Left-sided thoracic back pain 2019   • Migraine    • MITCH (stress urinary incontinence, female)         Past Surgical History:   Procedure Laterality Date   • BUNIONECTOMY Right    • BUNIONECTOMY Left     DR. BONITA RICHARDSON   • CHOLECYSTECTOMY N/A     DR. TONI GARCIA   • CYSTOSCOPY N/A 2017    DR. STANLEY MILLER   • ENDOMETRIAL ABLATION N/A    • ENDOSCOPY N/A 2009    CHRONIC ACTIVE GASTRITIS, DR. GLORY PACHECO AT [facility]   • TUBAL ABDOMINAL LIGATION Bilateral    • VAGINAL DELIVERY N/A 2002    DR. KOSTA KATZ AT [place]   • VAGINAL DELIVERY N/A 1999                       PT Assessment/Plan     Row Name 20 1707          PT Assessment    Assessment Comments  Pt not taping past couple days.  Pt with no knee pain currently HEP open and closed chain ex progressed.  Will re-asess in a week after doing home ex.  -SI       User Key  (r) = Recorded By, (t) = Taken By, (c) = Cosigned  By    Initials Name Provider Type    SI Meche Light, PTA Physical Therapy Assistant            OP Exercises     Row Name 09/23/20 1600             Subjective Comments    Subjective Comments  No knee pain to report and doing the stairs periodicallyy  -SI         Subjective Pain    Able to rate subjective pain?  yes  -SI      Pre-Treatment Pain Level  0  -SI         Total Minutes    22390 - PT Therapeutic Exercise Minutes  45  -SI         Exercise 1    Exercise Name 1  Quad set  -SI      Reps 1  10  -SI      Time 1  10  -SI         Exercise 2    Exercise Name 2  Bridge with adduction  -SI      Sets 2  2  -SI      Reps 2  10  -SI      Time 2  3 sec hold time  -SI         Exercise 3    Exercise Name 3  S/L clamshell  -SI      Sets 3  2  -SI      Reps 3  10  -SI      Additional Comments  RTB  -SI         Exercise 4    Exercise Name 4  long sitting HS stretch  -SI      Reps 4  3 B  -SI      Time 4  20  -SI         Exercise 5    Exercise Name 5     -SI      Reps 5  --  -SI         Exercise 6    Exercise Name 6  SLR (foot neutral, then slight ER )  -SI      Sets 6  2  -SI      Reps 6  10  -SI      Additional Comments  2lbs  -SI         Exercise 7    Exercise Name 7  prone hip extension  -SI      Sets 7  2  -SI      Reps 7  10  -SI      Additional Comments  2lbs  -SI         Exercise 8    Exercise Name 8  Recumbent bike  -SI      Time 8  5 min  -SI         Exercise 9    Exercise Name 9  calf stretch off edge of step  -SI      Reps 9  3  -SI      Time 9  20  -SI         Exercise 10    Exercise Name 10  LAQ, #2  -SI      Reps 10  10  -SI      Time 10  5  -SI      Additional Comments  2lbs  -SI         Exercise 11    Exercise Name 11  Prone quad stretch, B  -SI      Reps 11  --  -SI      Time 11  --  -SI         Exercise 12    Exercise Name 12  SAQ, B  -SI      Reps 12  10  -SI      Time 12  5  -SI      Additional Comments  2lbs  -SI         Exercise 13    Exercise Name 13  HS curls, B  -SI      Reps 13     -SI          Exercise 14    Exercise Name 14  6 inch forward and lateral step ups  -SI      Reps 14  20  -SI        User Key  (r) = Recorded By, (t) = Taken By, (c) = Cosigned By    Initials Name Provider Type    Meche Cortes PTA Physical Therapy Assistant                       PT OP Goals     Row Name 09/23/20 1700          PT Short Term Goals    STG 1  Patient will be independent and compliant with initial home exercise program.  -SI     STG 1 Progress  Met  -SI     STG 2  Patient will be independent with education for symptom management, posture, body mechanics, and strategies to minimize stress on affected tissues  -SI     STG 2 Progress  Ongoing  -SI        Long Term Goals    LTG 2  Patient will demonstrate B 5/5 knee strength and grossly 4/5 hip strength for improved function and ease with extended ambulation activities.  -SI     LTG 2 Progress  Progressing  -SI     LTG 4  Pt. will report B knee pain </= 1-2/10 with all daily activities.   -SI     LTG 4 Progress  Progressing 0 today and past few days  -SI       User Key  (r) = Recorded By, (t) = Taken By, (c) = Cosigned By    Initials Name Provider Type    Meche Cortes PTA Physical Therapy Assistant          Therapy Education  Education Details: HEP daily  Given: HEP, Symptoms/condition management  Program: Progressed  How Provided: Verbal, Demonstration, Written  Provided to: Patient  Level of Understanding: Teach back education performed              Time Calculation:   Start Time: 1620  Stop Time: 1708  Time Calculation (min): 48 min  Total Timed Code Minutes- PT: 45 minute(s)  Therapy Charges for Today     Code Description Service Date Service Provider Modifiers Qty    32603785826 HC PT THER PROC EA 15 MIN 9/23/2020 Meche Light PTA GP 3                    Meche Light PTA  9/23/2020

## 2020-09-30 ENCOUNTER — HOSPITAL ENCOUNTER (OUTPATIENT)
Dept: PHYSICAL THERAPY | Facility: HOSPITAL | Age: 50
Setting detail: THERAPIES SERIES
Discharge: HOME | End: 2020-09-30

## 2020-09-30 DIAGNOSIS — M25.561 CHRONIC PAIN OF BOTH KNEES: Primary | ICD-10-CM

## 2020-09-30 DIAGNOSIS — G89.29 CHRONIC PAIN OF BOTH KNEES: Primary | ICD-10-CM

## 2020-09-30 DIAGNOSIS — M25.562 CHRONIC PAIN OF BOTH KNEES: Primary | ICD-10-CM

## 2020-09-30 PROCEDURE — 97110 THERAPEUTIC EXERCISES: CPT

## 2020-10-01 ENCOUNTER — HOSPITAL ENCOUNTER (OUTPATIENT)
Dept: MAMMOGRAPHY | Facility: HOSPITAL | Age: 50
Discharge: HOME | End: 2020-10-01
Admitting: INTERNAL MEDICINE

## 2020-10-01 DIAGNOSIS — Z12.31 VISIT FOR SCREENING MAMMOGRAM: ICD-10-CM

## 2020-10-01 PROCEDURE — 77067 SCR MAMMO BI INCL CAD: CPT

## 2020-10-01 PROCEDURE — 77063 BREAST TOMOSYNTHESIS BI: CPT

## 2020-10-02 ENCOUNTER — HOSPITAL ENCOUNTER (OUTPATIENT)
Dept: PHYSICAL THERAPY | Facility: HOSPITAL | Age: 50
Setting detail: THERAPIES SERIES
Discharge: HOME | End: 2020-10-02

## 2020-10-02 DIAGNOSIS — G89.29 CHRONIC PAIN OF BOTH KNEES: Primary | ICD-10-CM

## 2020-10-02 DIAGNOSIS — M25.562 CHRONIC PAIN OF BOTH KNEES: Primary | ICD-10-CM

## 2020-10-02 DIAGNOSIS — M25.561 CHRONIC PAIN OF BOTH KNEES: Primary | ICD-10-CM

## 2020-10-02 PROCEDURE — 97110 THERAPEUTIC EXERCISES: CPT

## 2020-10-02 NOTE — THERAPY TREATMENT NOTE
Outpatient Physical Therapy Ortho Treatment Note  [facility]     Patient Name: Agnes Hayes  : 1970  MRN: [id number]  Today's Date: 10/2/2020      Visit Date: 10/02/2020    Visit Dx:    ICD-10-CM ICD-9-CM   1. Chronic pain of both knees  M25.561 719.46    M25.562 338.29    G89.29        Patient Active Problem List   Diagnosis   • Hypertension   • Hyperlipidemia   • Slow transit constipation   • Family history of heart disease   • Recurrent UTI   • Incontinence in female   • Hemorrhoids   • Weight gain   • Family history of diabetes mellitus   • Constipation   • Screening for colorectal cancer        Past Medical History:   Diagnosis Date   • Abnormal mammogram 2018   • Bilateral bunions    • Bilateral chronic knee pain    • Breast asymmetry    • Constipation    • Cystitis 2017   • Elevated glucose 2019   • Gestational diabetes mellitus     WITH BOTH PREGNANCIES   • Hemorrhoids    • History of recurrent UTIs 2017    FOLLOWED BY DR. STANLEY MILLER   • Hyperlipidemia    • Hypertension    • Left-sided thoracic back pain 2019   • Migraine    • MITCH (stress urinary incontinence, female)         Past Surgical History:   Procedure Laterality Date   • BUNIONECTOMY Right    • BUNIONECTOMY Left     DR. BONITA RICHARDSON   • CHOLECYSTECTOMY N/A     DR. TONI GARCIA   • CYSTOSCOPY N/A 2017    DR. STANLEY MILLER   • ENDOMETRIAL ABLATION N/A    • ENDOSCOPY N/A 2009    CHRONIC ACTIVE GASTRITIS, DR. GLORY PACHECO AT [facility]   • TUBAL ABDOMINAL LIGATION Bilateral    • VAGINAL DELIVERY N/A 2002    DR. KOSTA KATZ AT [place]   • VAGINAL DELIVERY N/A 1999                       PT Assessment/Plan     Row Name 10/02/20 1644          PT Assessment    Assessment Comments  Pt with good tolerance to treatment today. She was sore for about 24 hours after last session, but since then has been trying to focus on her form on steps and is feeling much better. She demonstrates noted improvement in  form on step downs today allowing progression to lateral step down w/o issue. Cont cues needed for appropriate pace of exercise througout.  -        PT Plan    PT Plan Comments  assess tolerance to last session, cont to progress single limb activity, consider SL RDL, monster walk etc  -       User Key  (r) = Recorded By, (t) = Taken By, (c) = Cosigned By    Initials Name Provider Type     Adriana Collier, PT Physical Therapist            OP Exercises     Row Name 10/02/20 1600             Subjective Comments    Subjective Comments  Sore after last session about 24 hours but since then has been feeling pretty good. Has been practicing steps like instructed.   -         Total Minutes    11249 - PT Therapeutic Exercise Minutes  45  -         Exercise 1    Exercise Name 1  Quad set  -      Reps 1  10  -      Time 1  10  -JH      Additional Comments  bilateral  -         Exercise 2    Exercise Name 2  Bridge with AB and AD  -      Sets 2  2  -      Reps 2  10 ea  -      Time 2  3 sec hold time  -      Additional Comments  RTB  -         Exercise 3    Exercise Name 3  S/L clamshell  -      Sets 3  2  -      Reps 3  10  -JH      Additional Comments  GTB  -         Exercise 4    Exercise Name 4  STS elevated surface RTB knees  -      Cueing 4  Verbal;Demo  -      Reps 4  15  -JH         Exercise 5    Exercise Name 5  side steps with TB  -      Cueing 5  Verbal;Demo  -      Reps 5  3 laps    -      Time 5  moved band to ankles  -      Additional Comments  RTB  -         Exercise 6    Exercise Name 6  SLR (foot neutral, then slight ER )  -      Sets 6  2  -      Reps 6  10  -JH      Additional Comments  2#  -         Exercise 7    Exercise Name 7  prone hip extension  -      Sets 7  2  -      Reps 7  10  -JH      Additional Comments  2#  -         Exercise 8    Exercise Name 8  Recumbent bike  -      Time 8  5 min  -         Exercise 10    Exercise Name 10  LAQ,  #2  -JH      Reps 10  10  -JH      Time 10  5  -JH      Additional Comments  lower slowly  -         Exercise 12    Exercise Name 12  SAQ, B  -      Reps 12  10  -JH      Time 12  5  -JH      Additional Comments  2#  -         Exercise 13    Exercise Name 13  heel tap fwd off foam  -      Cueing 13  Verbal;Demo  -      Reps 13  10ea  -      Additional Comments  improved form but deteriorates w/ fatigue, cues to raise arch and push knee out   -         Exercise 14    Exercise Name 14  lateral step down off foam  -      Reps 14  10  -JH      Additional Comments  cues to push knee out and tap closer to prevent valgus  -        User Key  (r) = Recorded By, (t) = Taken By, (c) = Cosigned By    Initials Name Provider Type    Adriana Ayers, PT Physical Therapist                       PT OP Goals     Row Name 10/02/20 1600          PT Short Term Goals    STG 1  Patient will be independent and compliant with initial home exercise program.  -     STG 1 Progress  Met  -     STG 2  Patient will be independent with education for symptom management, posture, body mechanics, and strategies to minimize stress on affected tissues  -     STG 2 Progress  Ongoing  -        Long Term Goals    LTG 2  Patient will demonstrate B 5/5 knee strength and grossly 4/5 hip strength for improved function and ease with extended ambulation activities.  -     LTG 2 Progress  Progressing  -     LTG 4  Pt. will report B knee pain </= 1-2/10 with all daily activities.   -     LTG 4 Progress  Progressing 0 today and past few days  -       User Key  (r) = Recorded By, (t) = Taken By, (c) = Cosigned By    Initials Name Provider Type    Adriana Ayers, PT Physical Therapist          Therapy Education  Given: HEP, Symptoms/condition management  Program: Reinforced  How Provided: Verbal, Demonstration  Provided to: Patient  Level of Understanding: Teach back education performed              Time Calculation:    Start Time: 1615  Stop Time: 1700  Time Calculation (min): 45 min  Therapy Charges for Today     Code Description Service Date Service Provider Modifiers Qty    25942769690 HC PT THER PROC EA 15 MIN 10/2/2020 Adriana Collier, PT GP 3                    Adriana Collier, PT  10/2/2020

## 2020-10-07 ENCOUNTER — HOSPITAL ENCOUNTER (OUTPATIENT)
Dept: PHYSICAL THERAPY | Facility: HOSPITAL | Age: 50
Setting detail: THERAPIES SERIES
Discharge: HOME | End: 2020-10-07

## 2020-10-07 DIAGNOSIS — G89.29 CHRONIC PAIN OF BOTH KNEES: Primary | ICD-10-CM

## 2020-10-07 DIAGNOSIS — M25.562 CHRONIC PAIN OF BOTH KNEES: Primary | ICD-10-CM

## 2020-10-07 DIAGNOSIS — M25.561 CHRONIC PAIN OF BOTH KNEES: Primary | ICD-10-CM

## 2020-10-07 PROCEDURE — 97110 THERAPEUTIC EXERCISES: CPT

## 2020-10-07 NOTE — THERAPY DISCHARGE NOTE
Outpatient Physical Therapy Ortho Treatment Note/Discharge Summary  [facility]     Patient Name: Agnes Hayes  : 1970  MRN: [id number]  Today's Date: 10/7/2020      Visit Date: 10/07/2020    Visit Dx:    ICD-10-CM ICD-9-CM   1. Chronic pain of both knees  M25.561 719.46    M25.562 338.29    G89.29        Patient Active Problem List   Diagnosis   • Hypertension   • Hyperlipidemia   • Slow transit constipation   • Family history of heart disease   • Recurrent UTI   • Incontinence in female   • Hemorrhoids   • Weight gain   • Family history of diabetes mellitus   • Constipation   • Screening for colorectal cancer        Past Medical History:   Diagnosis Date   • Abnormal mammogram 2018   • Bilateral bunions    • Bilateral chronic knee pain    • Breast asymmetry    • Constipation    • Cystitis 2017   • Elevated glucose 2019   • Gestational diabetes mellitus     WITH BOTH PREGNANCIES   • Hemorrhoids    • History of recurrent UTIs 2017    FOLLOWED BY DR. STANLEY MILLER   • Hyperlipidemia    • Hypertension    • Left-sided thoracic back pain 2019   • Migraine    • MITCH (stress urinary incontinence, female)         Past Surgical History:   Procedure Laterality Date   • BUNIONECTOMY Right    • BUNIONECTOMY Left     DR. BONITA RICHARDSON   • CHOLECYSTECTOMY N/A     DR. TONI GARCIA   • CYSTOSCOPY N/A 2017    DR. STANLEY MILLER   • ENDOMETRIAL ABLATION N/A    • ENDOSCOPY N/A 2009    CHRONIC ACTIVE GASTRITIS, DR. GLORY PACHECO AT [facility]   • TUBAL ABDOMINAL LIGATION Bilateral    • VAGINAL DELIVERY N/A 2002    DR. KOSTA KATZ AT [place]   • VAGINAL DELIVERY N/A 1999       PT Ortho     Row Name 10/07/20 1600       Subjective Comments    Subjective Comments  Pt states she feels ready to DC PT.  -SI       Subjective Pain    Able to rate subjective pain?  yes  -SI    Pre-Treatment Pain Level  0  -SI    Post-Treatment Pain Level  0  -SI    Subjective Pain Comment  pain 1-2 when have  it both knees.  -SI       General ROM    GENERAL ROM COMMENTS  0 to 130 both  -SI       MMT Right Lower Ext    Rt Hip Flexion MMT, Gross Movement  (4+/5) good plus  -SI    Rt Hip Extension MMT, Gross Movement  (4+/5) good plus  -SI    Rt Hip ABduction MMT, Gross Movement  (4+/5) good plus  -SI    Rt Knee Extension MMT, Gross Movement  (4+/5) good plus  -SI    Rt Knee Flexion MMT, Gross Movement  (4+/5) good plus  -SI       MMT Left Lower Ext    Lt Hip Flexion MMT, Gross Movement  (4+/5) good plus  -SI    Lt Hip Extension MMT, Gross Movement  (4+/5) good plus  -SI    Lt Hip ABduction MMT, Gross Movement  (4+/5) good plus  -SI    Lt Knee Extension MMT, Gross Movement  (4+/5) good plus  -SI    Lt Knee Flexion MMT, Gross Movement  (4+/5) good plus  -SI       Gait/Stairs (Locomotion)    Handrail Location (Stairs)  none  -SI    Number of Steps (Stairs)  5  -SI    Ascending Technique (Stairs)  step-over-step  -SI    Descending Technique (Stairs)  step-over-step  -SI    Comment (Gait/Stairs)  normal gait level surfaces and stairs  -SI      User Key  (r) = Recorded By, (t) = Taken By, (c) = Cosigned By    Initials Name Provider Type    Meche Cortes PTA Physical Therapy Assistant                      PT Assessment/Plan     Row Name 10/07/20 1649          PT Assessment    Assessment Comments  Pt feels ind with HEP.  She uses her Eliptical at home and doing PT HEP daily to  every other day.  -SI       User Key  (r) = Recorded By, (t) = Taken By, (c) = Cosigned By    Initials Name Provider Type    Meche Cortes PTA Physical Therapy Assistant              OP Exercises     Row Name 10/07/20 1600             Subjective Comments    Subjective Comments  Pt states she feels ready to DC PT.  -SI         Subjective Pain    Able to rate subjective pain?  yes  -SI      Pre-Treatment Pain Level  0  -SI      Post-Treatment Pain Level  0  -SI      Subjective Pain Comment  pain 1-2 when have it both knees.  -SI         Total  Minutes    13344 - PT Therapeutic Exercise Minutes  45  -SI         Exercise 1    Exercise Name 1  Quad set  -SI      Reps 1  10  -SI      Time 1  10  -SI         Exercise 2    Exercise Name 2  Bridge with AB and AD  -SI      Sets 2  2  -SI      Reps 2  10 ea  -SI      Time 2  3 sec hold time  -SI         Exercise 3    Exercise Name 3  S/L clamshell  -SI      Sets 3  2  -SI      Reps 3  10  -SI      Additional Comments  GTB  -SI         Exercise 4    Exercise Name 4  STS elevated surface RTB knees  -SI      Cueing 4  Verbal;Demo  -SI      Reps 4  15  -SI         Exercise 5    Exercise Name 5  side steps with TB  -SI      Cueing 5  Verbal;Demo  -SI      Reps 5  3 laps    -SI      Time 5  moved band to ankles  -SI         Exercise 6    Exercise Name 6  SLR (foot neutral, then slight ER )  -SI      Sets 6  2  -SI      Reps 6  10  -SI      Additional Comments  2lbs  -SI         Exercise 7    Exercise Name 7  prone hip extension  -SI      Sets 7  2  -SI      Reps 7  10  -SI      Additional Comments  2lbs  -SI         Exercise 8    Exercise Name 8  Recumbent bike  -SI      Time 8  5 min  -SI         Exercise 10    Exercise Name 10  LAQ, #4  -SI      Reps 10  10  -SI      Time 10  5  -SI         Exercise 12    Exercise Name 12  SAQ, B  -SI      Reps 12  10  -SI      Time 12  5  -SI      Additional Comments  4 lbs  -SI         Exercise 13    Exercise Name 13  heel tap fwd off foam  -SI      Cueing 13  Verbal;Demo  -SI         Exercise 14    Exercise Name 14  lateral step down off foam  -SI      Reps 14  10  -SI        User Key  (r) = Recorded By, (t) = Taken By, (c) = Cosigned By    Initials Name Provider Type    Meche Cortes, PTA Physical Therapy Assistant                         PT OP Goals     Row Name 10/07/20 1700 10/07/20 1600       PT Short Term Goals    STG 1  Patient will be independent and compliant with initial home exercise program.  -SI  Patient will be independent and compliant with initial home exercise  program.  -SI    STG 1 Progress  Met  -SI  Met  -SI    STG 2  Patient will be independent with education for symptom management, posture, body mechanics, and strategies to minimize stress on affected tissues  -SI  Patient will be independent with education for symptom management, posture, body mechanics, and strategies to minimize stress on affected tissues  -SI    STG 2 Progress  Met  -SI  Met  -SI       Long Term Goals    LTG 1  Patient will be independent and compliant with advanced home exercise program to facilitate self-management of symptoms once discharged from formal therapy.  -SI  Patient will be independent and compliant with advanced home exercise program to facilitate self-management of symptoms once discharged from formal therapy.  -SI    LTG 1 Progress  Met  -SI  Partially Met  -SI    LTG 2  Patient will demonstrate B 5/5 knee strength and grossly 4/5 hip strength for improved function and ease with extended ambulation activities.  -SI  Patient will demonstrate B 5/5 knee strength and grossly 4/5 hip strength for improved function and ease with extended ambulation activities.  -SI    LTG 2 Progress  Progressing 4+/5 vs 5/5  -SI  Progressing 4+/5 vs 5/5  -SI    LTG 3  Patient will report improvement in KOS functional outcome measure to at least 60/80 for improved functionality with all daily activities  -SI  Patient will report improvement in KOS functional outcome measure to at least 60/80 for improved functionality with all daily activities  -SI    LTG 3 Progress  Met 70/80  -SI  --    LTG 4  Pt. will report B knee pain </= 1-2/10 with all daily activities.   -SI  Pt. will report B knee pain </= 1-2/10 with all daily activities.   -SI    LTG 4 Progress  Met  1-2 at worst andnot frequent  -SI  Met  1-2 at worst andnot frequent  -SI      User Key  (r) = Recorded By, (t) = Taken By, (c) = Cosigned By    Initials Name Provider Type    Meche Cortes, PTA Physical Therapy Assistant          Therapy  Education  Given: HEP, Symptoms/condition management, Pain management  Program: Reinforced  How Provided: Verbal, Demonstration, Written  Provided to: Patient    Outcome Measure Options: Knee Outcome Score- ADL  Knee Outcome Score  Knee Outcome Score Comments: 70/80      Time Calculation:   Start Time: 1620  Stop Time: 1705  Time Calculation (min): 45 min  Total Timed Code Minutes- PT: 40 minute(s)  Therapy Charges for Today     Code Description Service Date Service Provider Modifiers Qty    76089393410 HC PT THER PROC EA 15 MIN 10/7/2020 Meche Light PTA GP 3          PT G-Codes  Outcome Measure Options: Knee Outcome Score- ADL     OP PT Discharge Summary  Date of Discharge: 10/07/20  Reason for Discharge: All goals achieved  Discharge Destination: Home with home program      Meche Light PTA  10/7/2020

## 2020-12-15 ENCOUNTER — OFFICE VISIT (OUTPATIENT)
Dept: INTERNAL MEDICINE | Facility: CLINIC | Age: 50
End: 2020-12-15

## 2020-12-15 VITALS
DIASTOLIC BLOOD PRESSURE: 78 MMHG | HEIGHT: 66 IN | TEMPERATURE: 99.3 F | OXYGEN SATURATION: 99 % | BODY MASS INDEX: 26.03 KG/M2 | HEART RATE: 59 BPM | SYSTOLIC BLOOD PRESSURE: 124 MMHG | WEIGHT: 162 LBS

## 2020-12-15 DIAGNOSIS — I10 ESSENTIAL HYPERTENSION: Primary | ICD-10-CM

## 2020-12-15 DIAGNOSIS — E78.49 OTHER HYPERLIPIDEMIA: ICD-10-CM

## 2020-12-15 PROCEDURE — 99213 OFFICE O/P EST LOW 20 MIN: CPT | Performed by: INTERNAL MEDICINE

## 2020-12-15 NOTE — PROGRESS NOTES
"Subjective   Agnes Hayes is a 50 y.o. female seen today for Hypertension (4 month follow up) and Hyperlipidemia.     Hypertension  This is a chronic problem. The current episode started more than 1 year ago. The problem is unchanged. The problem is controlled. Pertinent negatives include no chest pain, palpitations or shortness of breath.   Hyperlipidemia  This is a chronic problem. The current episode started more than 1 year ago. The problem is controlled. Recent lipid tests were reviewed and are normal. She has no history of diabetes. Pertinent negatives include no chest pain or shortness of breath.        The following portions of the patient's history were reviewed and updated as appropriate: allergies, current medications, past social history and problem list.    Review of Systems   Constitutional: Negative for appetite change, chills, fatigue, fever and unexpected weight change.   HENT: Negative for congestion and trouble swallowing.    Respiratory: Negative for cough, choking, shortness of breath and wheezing.    Cardiovascular: Negative for chest pain, palpitations and leg swelling.   Musculoskeletal: Negative for joint swelling.   Skin: Negative for color change.   Psychiatric/Behavioral: Negative for dysphoric mood and sleep disturbance. The patient is not nervous/anxious.        Objective   /78 (BP Location: Left arm, Patient Position: Sitting, Cuff Size: Adult)   Pulse 59   Temp 99.3 °F (37.4 °C)   Ht 167.6 cm (66\")   Wt 73.5 kg (162 lb)   SpO2 99%   BMI 26.15 kg/m²   Physical Exam  Vitals signs and nursing note reviewed.   Constitutional:       General: She is not in acute distress.     Appearance: She is well-developed.   Neck:      Musculoskeletal: Neck supple.      Thyroid: No thyromegaly.   Cardiovascular:      Rate and Rhythm: Normal rate and regular rhythm.      Heart sounds: Normal heart sounds.   Pulmonary:      Effort: Pulmonary effort is normal. No respiratory distress.      Breath " sounds: Normal breath sounds. No wheezing or rales.   Skin:     General: Skin is warm and dry.   Neurological:      Mental Status: She is alert.      Deep Tendon Reflexes:      Reflex Scores:       Patellar reflexes are 2+ on the right side and 2+ on the left side.  Psychiatric:         Behavior: Behavior normal.         Assessment/Plan   Problems Addressed this Visit        Unprioritized    Hypertension - Primary     Hypertension is improving with treatment.  Continue current treatment regimen.  Dietary sodium restriction.  Weight loss.  Regular aerobic exercise.  Blood pressure will be reassessed at the next regular appointment.         Hyperlipidemia     Lipid abnormalities are improving with lifestyle modifications.  Nutritional counseling was provided.  Lipids will be reassessed in 6 months.           Diagnoses       Codes Comments    Essential hypertension    -  Primary ICD-10-CM: I10  ICD-9-CM: 401.9     Other hyperlipidemia     ICD-10-CM: E78.49  ICD-9-CM: 272.4

## 2021-01-04 ENCOUNTER — IMMUNIZATION (OUTPATIENT)
Dept: VACCINE CLINIC | Facility: HOSPITAL | Age: 51
End: 2021-01-04

## 2021-01-04 PROCEDURE — 91300 HC SARSCOV02 VAC 30MCG/0.3ML IM: CPT | Performed by: INTERNAL MEDICINE

## 2021-01-04 PROCEDURE — 0001A: CPT | Performed by: INTERNAL MEDICINE

## 2021-01-25 ENCOUNTER — IMMUNIZATION (OUTPATIENT)
Dept: VACCINE CLINIC | Facility: HOSPITAL | Age: 51
End: 2021-01-25

## 2021-01-25 PROCEDURE — 91300 HC SARSCOV02 VAC 30MCG/0.3ML IM: CPT | Performed by: INTERNAL MEDICINE

## 2021-01-25 PROCEDURE — 0002A: CPT | Performed by: INTERNAL MEDICINE

## 2021-02-26 DIAGNOSIS — I10 HYPERTENSION, BENIGN: ICD-10-CM

## 2021-02-26 RX ORDER — LOSARTAN POTASSIUM 50 MG/1
50 TABLET ORAL DAILY
Qty: 90 TABLET | Refills: 1 | Status: SHIPPED | OUTPATIENT
Start: 2021-02-26 | End: 2021-08-28 | Stop reason: SDUPTHER

## 2021-08-03 ENCOUNTER — TRANSCRIBE ORDERS (OUTPATIENT)
Dept: ADMINISTRATIVE | Facility: HOSPITAL | Age: 51
End: 2021-08-03

## 2021-08-03 DIAGNOSIS — Z12.31 BREAST CANCER SCREENING BY MAMMOGRAM: Primary | ICD-10-CM

## 2021-08-10 ENCOUNTER — TELEPHONE (OUTPATIENT)
Dept: INTERNAL MEDICINE | Facility: CLINIC | Age: 51
End: 2021-08-10

## 2021-08-10 NOTE — TELEPHONE ENCOUNTER
Caller: Agnes Hayes    Relationship to patient: Self    Best call back number:[phone] (H)    Chief complaint:     Type of visit: PHYSICAL    Requested date: THE WEEK OF THE 16TH PATIENT STATES OFF WORK THAT WEEK    If rescheduling, when is the original appointment: 08/17/21    Additional notes:PATIENT RECEIVED MESSAGE THAT SHE NEEDED TO RESCHEDULE APPOINTMENT DUE TO DR SERNA BEING OUT

## 2021-08-18 ENCOUNTER — OFFICE VISIT (OUTPATIENT)
Dept: OBSTETRICS AND GYNECOLOGY | Age: 51
End: 2021-08-18

## 2021-08-18 VITALS
DIASTOLIC BLOOD PRESSURE: 78 MMHG | SYSTOLIC BLOOD PRESSURE: 130 MMHG | WEIGHT: 147.6 LBS | HEIGHT: 66 IN | BODY MASS INDEX: 23.72 KG/M2

## 2021-08-18 DIAGNOSIS — N39.3 STRESS INCONTINENCE IN FEMALE: Primary | ICD-10-CM

## 2021-08-18 PROCEDURE — 99396 PREV VISIT EST AGE 40-64: CPT | Performed by: OBSTETRICS & GYNECOLOGY

## 2021-08-18 NOTE — PROGRESS NOTES
Routine Annual Visit    2021    Patient: Agnes Hayes          MR#:[id number]      Chief Complaint   Patient presents with   • Gynecologic Exam     AE Today, Last AE 2020 (-) HPV (-), MG 10/2020. Pt has no complaints.        History of Present Illness    51 y.o. female  who presents for annual exam.   Having monthly menses but cycles a bit shorter now. No hot flashes etc. No intermenstrual bleeding  Wears a pad every day, has leakage that just doesn't have rhyme or reason, not so much with cough/laugh just notices it as she goes throughout the day.  No issues with SA      No LMP recorded. (Menstrual status: Tubal ligation).  Obstetric History:  OB History        2    Para   2    Term   2       0    AB   0    Living   2       SAB        TAB        Ectopic        Molar        Multiple        Live Births                   Menstrual History:     No LMP recorded. (Menstrual status: Tubal ligation).       ________________________________________  Patient Active Problem List   Diagnosis   • Hypertension   • Hyperlipidemia   • Slow transit constipation   • Family history of heart disease   • Recurrent UTI   • Incontinence in female   • Hemorrhoids   • Weight gain   • Family history of diabetes mellitus   • Constipation   • Screening for colorectal cancer       Past Medical History:   Diagnosis Date   • Abnormal mammogram 2018   • Bilateral bunions    • Bilateral chronic knee pain    • Breast asymmetry    • Constipation    • Cystitis 2017   • Elevated glucose 2019   • Gestational diabetes mellitus     WITH BOTH PREGNANCIES   • Hemorrhoids    • History of recurrent UTIs 2017    FOLLOWED BY DR. STANLEY MILLER   • Hyperlipidemia    • Hypertension    • Left-sided thoracic back pain 2019   • Migraine    • MITCH (stress urinary incontinence, female)        Family History   Problem Relation Age of Onset   • Heart attack Mother    • Stroke Mother    • Hypertension Mother    • Diabetes Mother    •  "Heart attack Father    • Hypertension Father    • Stroke Father    • Schizophrenia Brother    • Mental illness Brother    • Breast cancer Neg Hx    • Ovarian cancer Neg Hx    • Uterine cancer Neg Hx    • Colon cancer Neg Hx        Past Surgical History:   Procedure Laterality Date   • BUNIONECTOMY Right 2008   • BUNIONECTOMY Left 2010    DR. BONITA RICHARDSON   • CHOLECYSTECTOMY N/A 2003    DR. TONI GARCIA   • CYSTOSCOPY N/A 08/2017    DR. STANLEY MILLER   • ENDOMETRIAL ABLATION N/A 2007   • ENDOSCOPY N/A 09/08/2009    CHRONIC ACTIVE GASTRITIS, DR. GLORY PACHECO AT [facility]   • TUBAL ABDOMINAL LIGATION Bilateral 2002   • VAGINAL DELIVERY N/A 04/16/2002    DR. KOSTA KATZ AT [place]   • VAGINAL DELIVERY N/A 06/1999       Social History     Tobacco Use   Smoking Status Never Smoker   Smokeless Tobacco Never Used       has a current medication list which includes the following prescription(s): aspirin, b complex-c-e-zinc, cephalexin, cetirizine, cholecalciferol, losartan, multivitamin, fish oil, and hydrocortisone (perianal).  ________________________________________      Review of Systems   Constitutional: Negative for fever and unexpected weight change.   Respiratory: Negative for shortness of breath.    Cardiovascular: Negative for chest pain.   Gastrointestinal: Negative for abdominal pain, constipation and diarrhea.   Genitourinary: Negative for frequency and urgency.   Hematological: Negative for adenopathy.   Psychiatric/Behavioral: Negative for dysphoric mood.       Objective   Physical Exam    /78   Ht 167.6 cm (66\")   Wt 67 kg (147 lb 9.6 oz)   Breastfeeding No   BMI 23.82 kg/m²    BP Readings from Last 3 Encounters:   08/18/21 130/78   12/15/20 124/78   09/08/20 136/80      Wt Readings from Last 3 Encounters:   08/18/21 67 kg (147 lb 9.6 oz)   12/15/20 73.5 kg (162 lb)   09/08/20 71.4 kg (157 lb 6.4 oz)         BMI: Body mass index is 23.82 kg/m².       General:   alert, appears stated age and cooperative "   Neck: No thyromegaly or LAD, no carotid bruit noted   Heart:: regular rate and rhythm, S1, S2 normal, no murmur, click, rub or gallop   Lungs: normal respiratory effort and auscultation   Abdomen: soft, non-tender, without masses or organomegaly   Breast: inspection negative, no nipple discharge or bleeding, no masses or nodularity palpable   Urethra and bladder: urethral meatus normal; bladder nontender to palpation; no appreciable urethral hypermobility   Vulva: normal, Bartholin's, Urethra, [place]'s normal   Vagina: normal mucosa, normal discharge   Cervix: multiparous appearance and no lesions   Uterus: normal size and anteverted   Adnexa: normal adnexa and no mass, fullness, tenderness       Assessment:    normal annual exam   incontinence    Plan:    Plan     []  Mammogram request made- already ordered by Sophia Sullivan and scheduled  []  PAP done  []  Labs:   []  GC/Chl/TV  []  DEXA scan   []  Referral for colonoscopy: estabished with mary Coats scope not covered prior to age 52 she states and recommended she complete next yr    Stress incont vs intrinsic urethral insufficiency, referral made to urogynecology as interested in tx    Counseling  [x]  Nutrition  [x]  Physical activity/regular exercise   [x]  Healthy weight  []  Injury prevention  []  Smoking cessation  []  Substance misuse/abuse  [x]  Sexual behavior  []  STD prevention  []  Contraception  []  Dental health  []  Mental health  [x]  Immunization  [x]  Encouraged SBE      Karen Arredondo MD  08/18/2021  08:15 EDT

## 2021-08-28 DIAGNOSIS — I10 HYPERTENSION, BENIGN: ICD-10-CM

## 2021-08-30 RX ORDER — LOSARTAN POTASSIUM 50 MG/1
50 TABLET ORAL DAILY
Qty: 90 TABLET | Refills: 1 | Status: SHIPPED | OUTPATIENT
Start: 2021-08-30 | End: 2021-10-05 | Stop reason: SDUPTHER

## 2021-10-02 ENCOUNTER — IMMUNIZATION (OUTPATIENT)
Dept: VACCINE CLINIC | Facility: HOSPITAL | Age: 51
End: 2021-10-02

## 2021-10-02 PROCEDURE — 91300 HC SARSCOV02 VAC 30MCG/0.3ML IM: CPT | Performed by: INTERNAL MEDICINE

## 2021-10-02 PROCEDURE — 0003A: CPT | Performed by: INTERNAL MEDICINE

## 2021-10-02 PROCEDURE — 0004A ADM SARSCOV2 30MCG/0.3ML BOOSTER: CPT | Performed by: INTERNAL MEDICINE

## 2021-10-05 ENCOUNTER — OFFICE VISIT (OUTPATIENT)
Dept: INTERNAL MEDICINE | Facility: CLINIC | Age: 51
End: 2021-10-05

## 2021-10-05 VITALS
HEIGHT: 66 IN | BODY MASS INDEX: 22.82 KG/M2 | SYSTOLIC BLOOD PRESSURE: 118 MMHG | TEMPERATURE: 97.7 F | HEART RATE: 62 BPM | WEIGHT: 142 LBS | OXYGEN SATURATION: 99 % | DIASTOLIC BLOOD PRESSURE: 84 MMHG

## 2021-10-05 DIAGNOSIS — I10 HYPERTENSION, BENIGN: ICD-10-CM

## 2021-10-05 DIAGNOSIS — Z82.49 FAMILY HISTORY OF HEART DISEASE: ICD-10-CM

## 2021-10-05 DIAGNOSIS — Z83.3 FAMILY HISTORY OF DIABETES MELLITUS: ICD-10-CM

## 2021-10-05 DIAGNOSIS — Z00.00 ANNUAL PHYSICAL EXAM: Primary | ICD-10-CM

## 2021-10-05 DIAGNOSIS — N39.0 RECURRENT UTI: ICD-10-CM

## 2021-10-05 DIAGNOSIS — K59.01 SLOW TRANSIT CONSTIPATION: ICD-10-CM

## 2021-10-05 DIAGNOSIS — E78.49 OTHER HYPERLIPIDEMIA: ICD-10-CM

## 2021-10-05 DIAGNOSIS — R32 INCONTINENCE IN FEMALE: ICD-10-CM

## 2021-10-05 DIAGNOSIS — I10 PRIMARY HYPERTENSION: ICD-10-CM

## 2021-10-05 PROBLEM — R63.5 WEIGHT GAIN: Status: RESOLVED | Noted: 2020-07-24 | Resolved: 2021-10-05

## 2021-10-05 LAB
ALBUMIN SERPL-MCNC: 4.8 G/DL (ref 3.5–5.2)
ALBUMIN/GLOB SERPL: 2.5 G/DL
ALP SERPL-CCNC: 83 U/L (ref 39–117)
ALT SERPL-CCNC: 11 U/L (ref 1–33)
AST SERPL-CCNC: 19 U/L (ref 1–32)
BASOPHILS # BLD AUTO: 0.03 10*3/MM3 (ref 0–0.2)
BASOPHILS NFR BLD AUTO: 1.2 % (ref 0–1.5)
BILIRUB SERPL-MCNC: 0.4 MG/DL (ref 0–1.2)
BUN SERPL-MCNC: 10 MG/DL (ref 6–20)
BUN/CREAT SERPL: 13.5 (ref 7–25)
CALCIUM SERPL-MCNC: 9.6 MG/DL (ref 8.6–10.5)
CHLORIDE SERPL-SCNC: 101 MMOL/L (ref 98–107)
CHOLEST SERPL-MCNC: 203 MG/DL (ref 0–200)
CO2 SERPL-SCNC: 29.5 MMOL/L (ref 22–29)
CREAT SERPL-MCNC: 0.74 MG/DL (ref 0.57–1)
EOSINOPHIL # BLD AUTO: 0.09 10*3/MM3 (ref 0–0.4)
EOSINOPHIL NFR BLD AUTO: 3.5 % (ref 0.3–6.2)
ERYTHROCYTE [DISTWIDTH] IN BLOOD BY AUTOMATED COUNT: 12.3 % (ref 12.3–15.4)
GLOBULIN SER CALC-MCNC: 1.9 GM/DL
GLUCOSE SERPL-MCNC: 86 MG/DL (ref 65–99)
HCT VFR BLD AUTO: 39.1 % (ref 34–46.6)
HDLC SERPL-MCNC: 64 MG/DL (ref 40–60)
HGB BLD-MCNC: 13.3 G/DL (ref 12–15.9)
IMM GRANULOCYTES # BLD AUTO: 0 10*3/MM3 (ref 0–0.05)
IMM GRANULOCYTES NFR BLD AUTO: 0 % (ref 0–0.5)
LDLC SERPL CALC-MCNC: 124 MG/DL (ref 0–100)
LYMPHOCYTES # BLD AUTO: 0.85 10*3/MM3 (ref 0.7–3.1)
LYMPHOCYTES NFR BLD AUTO: 32.9 % (ref 19.6–45.3)
MCH RBC QN AUTO: 31.6 PG (ref 26.6–33)
MCHC RBC AUTO-ENTMCNC: 34 G/DL (ref 31.5–35.7)
MCV RBC AUTO: 92.9 FL (ref 79–97)
MONOCYTES # BLD AUTO: 0.4 10*3/MM3 (ref 0.1–0.9)
MONOCYTES NFR BLD AUTO: 15.5 % (ref 5–12)
NEUTROPHILS # BLD AUTO: 1.21 10*3/MM3 (ref 1.7–7)
NEUTROPHILS NFR BLD AUTO: 46.9 % (ref 42.7–76)
NRBC BLD AUTO-RTO: 0 /100 WBC (ref 0–0.2)
PLATELET # BLD AUTO: 234 10*3/MM3 (ref 140–450)
POTASSIUM SERPL-SCNC: 4.2 MMOL/L (ref 3.5–5.2)
PROT SERPL-MCNC: 6.7 G/DL (ref 6–8.5)
RBC # BLD AUTO: 4.21 10*6/MM3 (ref 3.77–5.28)
SODIUM SERPL-SCNC: 138 MMOL/L (ref 136–145)
TRIGL SERPL-MCNC: 81 MG/DL (ref 0–150)
VLDLC SERPL CALC-MCNC: 15 MG/DL (ref 5–40)
WBC # BLD AUTO: 2.58 10*3/MM3 (ref 3.4–10.8)

## 2021-10-05 PROCEDURE — 99396 PREV VISIT EST AGE 40-64: CPT | Performed by: INTERNAL MEDICINE

## 2021-10-05 RX ORDER — LOSARTAN POTASSIUM 50 MG/1
50 TABLET ORAL DAILY
Qty: 90 TABLET | Refills: 3 | Status: SHIPPED | OUTPATIENT
Start: 2021-10-05 | End: 2022-10-07 | Stop reason: SDUPTHER

## 2021-10-05 NOTE — PATIENT INSTRUCTIONS

## 2021-10-05 NOTE — PROGRESS NOTES
"Chief Complaint  Annual Exam (Physical)    Subjective          Agnes Hayes presents to [facility] for   History of Present Illness    Review of Systems   Constitutional: Negative for appetite change, chills, fatigue, fever and unexpected weight change.   HENT: Negative for congestion, ear pain, mouth sores, sinus pain, sore throat, tinnitus, trouble swallowing and voice change.    Eyes: Negative for pain and visual disturbance.   Respiratory: Negative for cough, choking, shortness of breath and wheezing.    Cardiovascular: Negative for chest pain, palpitations and leg swelling.   Gastrointestinal: Positive for constipation. Negative for abdominal pain, blood in stool, diarrhea, nausea and vomiting.   Endocrine: Negative for cold intolerance, heat intolerance, polydipsia and polyuria.   Genitourinary: Positive for enuresis (1x - seeing UroGYN). Negative for difficulty urinating, dysuria, flank pain, frequency, hematuria, urgency and vaginal bleeding.   Musculoskeletal: Positive for arthralgias (knee pain - better with exercise). Negative for back pain, gait problem, joint swelling, myalgias, neck pain and neck stiffness.   Skin: Negative for color change and rash.   Allergic/Immunologic: Positive for environmental allergies. Negative for food allergies and immunocompromised state.   Neurological: Negative for dizziness, tremors, seizures, syncope, speech difficulty, weakness, numbness and headaches.   Hematological: Negative for adenopathy. Does not bruise/bleed easily.   Psychiatric/Behavioral: Negative for agitation, confusion, decreased concentration, dysphoric mood, sleep disturbance and suicidal ideas. The patient is not nervous/anxious.         Objective   Vital Signs:   /84 (BP Location: Left arm, Patient Position: Sitting, Cuff Size: Adult)   Pulse 62   Temp 97.7 °F (36.5 °C)   Ht 167.6 cm (66\")   Wt 64.4 kg (142 lb)   SpO2 99%   BMI 22.92 kg/m²     Physical " Exam  Vitals and nursing note reviewed.   Constitutional:       Appearance: She is well-developed.   HENT:      Right Ear: Hearing, tympanic membrane, ear canal and external ear normal.      Left Ear: Hearing, tympanic membrane, ear canal and external ear normal.      Nose:      Right Sinus: No maxillary sinus tenderness or frontal sinus tenderness.      Left Sinus: No maxillary sinus tenderness or frontal sinus tenderness.   Eyes:      General: Lids are normal.      Conjunctiva/sclera: Conjunctivae normal.      Pupils: Pupils are equal, round, and reactive to light.   Neck:      Thyroid: No thyroid mass or thyromegaly.      Vascular: No carotid bruit or JVD.      Trachea: Trachea normal. No tracheal deviation.   Cardiovascular:      Rate and Rhythm: Normal rate and regular rhythm.      Pulses:           Carotid pulses are 2+ on the right side and 2+ on the left side.       Radial pulses are 2+ on the right side and 2+ on the left side.        Dorsalis pedis pulses are 2+ on the right side and 2+ on the left side.        Posterior tibial pulses are 2+ on the right side and 2+ on the left side.      Heart sounds: S1 normal and S2 normal. No murmur heard.   No friction rub. No gallop.    Pulmonary:      Effort: Pulmonary effort is normal.      Breath sounds: Normal breath sounds.   Chest:      Chest wall: There is no dullness to percussion.      Breasts:         Right: No inverted nipple, mass, nipple discharge, skin change or tenderness.         Left: No inverted nipple, mass, nipple discharge, skin change or tenderness.   Abdominal:      General: Bowel sounds are normal. There is no abdominal bruit.      Palpations: Abdomen is soft.      Tenderness: There is no abdominal tenderness. There is no guarding or rebound.      Hernia: No hernia is present.   Musculoskeletal:         General: Normal range of motion.      Cervical back: Neck supple.   Lymphadenopathy:      Cervical: No cervical adenopathy.      Upper Body:       Right upper body: No supraclavicular adenopathy.      Left upper body: No supraclavicular adenopathy.   Skin:     General: Skin is warm and dry.   Neurological:      Mental Status: She is alert.      Cranial Nerves: No cranial nerve deficit.      Sensory: No sensory deficit.      Deep Tendon Reflexes:      Reflex Scores:       Patellar reflexes are 2+ on the right side and 2+ on the left side.       Result Review :                 Assessment and Plan    Problem List Items Addressed This Visit        Unprioritized    Hypertension    Current Assessment & Plan     Hypertension is unchanged.  Continue current treatment regimen.  Dietary sodium restriction.  Weight loss.  Regular aerobic exercise.  Continue current medications.  Blood pressure will be reassessed at the next regular appointment.         Relevant Medications    losartan (COZAAR) 50 MG tablet    Other Relevant Orders    CBC & Differential    Comprehensive Metabolic Panel    Lipid Panel    Hyperlipidemia    Overview     NMR-LP ok in past         Current Assessment & Plan     Lipid abnormalities are improving with treatment.  Nutritional counseling was provided.  Lipids will be reassessed in 1 year.         Relevant Orders    Comprehensive Metabolic Panel    Lipid Panel    Slow transit constipation    Overview     amitiza given by GI - BM too loose, so she stopped         Family history of heart disease    Current Assessment & Plan     Continue healthy meals         Recurrent UTI    Overview     Ok to use keflex after intercourse         Incontinence in female    Overview     F/u with Dr.Susan Hebert (may get bladder sling in future).         Family history of diabetes mellitus    Current Assessment & Plan     Continue low sugar diet           Other Visit Diagnoses     Annual physical exam    -  Primary    Relevant Orders    CBC & Differential    Comprehensive Metabolic Panel    Lipid Panel    Hypertension, benign        Relevant Medications    losartan  (COZAAR) 50 MG tablet          Follow Up   Return in about 1 year (around 10/5/2022) for Annual physical.  Patient was given instructions and counseling regarding her condition or for health maintenance advice. Please see specific information pulled into the AVS if appropriate.      Discussed need to stay up to date on screening exams as indicated on sex, age & risk factors. I encouraged healthy weight maintenance with diet & exercise. Need good sleep habits & continue to avoid tobacco & excessive alcohol.

## 2021-10-28 RX ORDER — CEPHALEXIN 500 MG/1
500 CAPSULE ORAL AS NEEDED
Qty: 30 CAPSULE | Refills: 3 | Status: SHIPPED | OUTPATIENT
Start: 2021-10-28 | End: 2022-10-07

## 2021-11-04 ENCOUNTER — PRE-ADMISSION TESTING (OUTPATIENT)
Dept: PREADMISSION TESTING | Facility: HOSPITAL | Age: 51
End: 2021-11-04

## 2021-11-04 VITALS
SYSTOLIC BLOOD PRESSURE: 132 MMHG | OXYGEN SATURATION: 100 % | HEIGHT: 66 IN | HEART RATE: 68 BPM | RESPIRATION RATE: 16 BRPM | DIASTOLIC BLOOD PRESSURE: 79 MMHG | WEIGHT: 143.7 LBS | BODY MASS INDEX: 23.09 KG/M2 | TEMPERATURE: 97.3 F

## 2021-11-04 PROBLEM — N39.3 FEMALE STRESS INCONTINENCE: Status: ACTIVE | Noted: 2021-11-04

## 2021-11-04 LAB
QT INTERVAL: 383 MS
SARS-COV-2 ORF1AB RESP QL NAA+PROBE: NOT DETECTED

## 2021-11-04 PROCEDURE — 93010 ELECTROCARDIOGRAM REPORT: CPT | Performed by: INTERNAL MEDICINE

## 2021-11-04 PROCEDURE — U0004 COV-19 TEST NON-CDC HGH THRU: HCPCS | Performed by: NURSE PRACTITIONER

## 2021-11-04 PROCEDURE — C9803 HOPD COVID-19 SPEC COLLECT: HCPCS | Performed by: NURSE PRACTITIONER

## 2021-11-04 PROCEDURE — 93005 ELECTROCARDIOGRAM TRACING: CPT

## 2021-11-04 NOTE — DISCHARGE INSTRUCTIONS
Take the following medications the morning of surgery:  NONE    If you are on prescription narcotic pain medication to control your pain you may also take that medication the morning of surgery.    General Instructions:  • Do not eat solid food after midnight the night before surgery.  • You may drink clear liquids day of surgery but must stop at least one hour before your hospital arrival time.  CLEAR LIQUIDS UNTIL 11:00 AM  • It is beneficial for you to have a clear drink that contains carbohydrates the day of surgery.  We suggest a 12 to 20 ounce bottle of Gatorade or Powerade for non-diabetic patients or a 12 to 20 ounce bottle of G2 or Powerade Zero for diabetic patients. (Pediatric patients, are not advised to drink a 12 to 20 ounce carbohydrate drink)    Clear liquids are liquids you can see through.  Nothing red in color.     Plain water                               Sports drinks  Sodas                                   Gelatin (Jell-O)  Fruit juices without pulp such as white grape juice and apple juice  Popsicles that contain no fruit or yogurt  Tea or coffee (no cream or milk added)  Gatorade / Powerade  G2 / Powerade Zero    • Infants may have breast milk up to four hours before surgery.  • Infants drinking formula may drink formula up to six hours before surgery.   • Patients who avoid smoking, chewing tobacco and alcohol for 4 weeks prior to surgery have a reduced risk of post-operative complications.  Quit smoking as many days before surgery as you can.  • Do not smoke, use chewing tobacco or drink alcohol the day of surgery.   • If applicable bring your C-PAP/ BI-PAP machine.  • Bring any papers given to you in the doctor’s office.  • Wear clean comfortable clothes.  • Do not wear contact lenses, false eyelashes or make-up.  Bring a case for your glasses.   • Bring crutches or walker if applicable.  • Remove all piercings.  Leave jewelry and any other valuables at home.  • Hair extensions with metal  clips must be removed prior to surgery.  • The Pre-Admission Testing nurse will instruct you to bring medications if unable to obtain an accurate list in Pre-Admission Testing.        If you were given a blood bank ID arm band remember to bring it with you the day of surgery.    Preventing a Surgical Site Infection:  • For 2 to 3 days before surgery, avoid shaving with a razor because the razor can irritate skin and make it easier to develop an infection.    • Any areas of open skin can increase the risk of a post-operative wound infection by allowing bacteria to enter and travel throughout the body.  Notify your surgeon if you have any skin wounds / rashes even if it is not near the expected surgical site.  The area will need assessed to determine if surgery should be delayed until it is healed.  • The night prior to surgery shower using a fresh bar of anti-bacterial soap (such as Dial) and clean washcloth.  Sleep in a clean bed with clean clothing.  Do not allow pets to sleep with you.  • Shower on the morning of surgery using a fresh bar of anti-bacterial soap (such as Dial) and clean washcloth.  Dry with a clean towel and dress in clean clothing.  • Ask your surgeon if you will be receiving antibiotics prior to surgery.  • Make sure you, your family, and all healthcare providers clean their hands with soap and water or an alcohol based hand  before caring for you or your wound.    Day of surgery: 11/5/2021 ARRIVAL TIME 12:00 PM  Your arrival time is approximately two hours before your scheduled surgery time.  Upon arrival, a Pre-op nurse and Anesthesiologist will review your health history, obtain vital signs, and answer questions you may have.  The only belongings needed at this time will be a list of your home medications and if applicable your C-PAP/BI-PAP machine.  A Pre-op nurse will start an IV and you may receive medication in preparation for surgery, including something to help you relax.      Please be aware that surgery does come with discomfort.  We want to make every effort to control your discomfort so please discuss any uncontrolled symptoms with your nurse.   Your doctor will most likely have prescribed pain medications.      If you are going home after surgery you will receive individualized written care instructions before being discharged.  A responsible adult must drive you to and from the hospital on the day of your surgery and stay with you for 24 hours.  Discharge prescriptions can be filled by the hospital pharmacy during regular pharmacy hours.  If you are having surgery late in the day/evening your prescription may be e-prescribed to your pharmacy.  Please verify your pharmacy hours or chose a 24 hour pharmacy to avoid not having access to your prescription because your pharmacy has closed for the day.    If you are staying overnight following surgery, you will be transported to your hospital room following the recovery period.  [facility] has all private rooms.    If you have any questions please call Pre-Admission Testing at [phone].  Deductibles and co-payments are collected on the day of service. Please be prepared to pay the required co-pay, deductible or deposit on the day of service as defined by your plan.    Patient Education for Self-Quarantine Process    • Following your COVID testing, we strongly recommend that you wear a mask when you are with other people and practice social distancing.   • Limit your activities to only required outings.  • Wash your hands with soap and water frequently for at least 20 seconds.   • Avoid touching your eyes, nose and mouth with unwashed hands.  • Do not share anything - utensils, drinking glasses, food from the same bowl.   • Sanitize household surfaces daily. Include all high touch areas (door handles, light switches, phones, countertops, etc.)    Call your surgeon immediately if you experience any of the following  symptoms:  • Sore Throat  • Shortness of Breath or difficulty breathing  • Cough  • Chills  • Body soreness or muscle pain  • Headache  • Fever  • New loss of taste or smell  • Do not arrive for your surgery ill.  Your procedure will need to be rescheduled to another time.  You will need to call your physician before the day of surgery to avoid any unnecessary exposure to hospital staff as well as other patients.

## 2021-11-04 NOTE — H&P
Female Pelvic Medicine and Reconstructive Surgery   History & Physical    Patient Identification:  Name: Agnes Hayes Age: 51 y.o. Sex: female :  1970 MRN: Female Pelvic Medicine and Reconstructive Surgery   History & Physical    Patient Identification:  Name: Agnes Hayes Age: 51 y.o. Sex: female :  1970 MRN: [id number]                            Problem List:    Female stress incontinence    Past Medical History:  Past Medical History:   Diagnosis Date   • Abnormal mammogram 2018   • Bilateral chronic knee pain    • Breast asymmetry    • Constipation    • Elevated glucose 2019   • Frequent UTI    • Gestational diabetes mellitus     WITH BOTH PREGNANCIES   • Hemorrhoids    • Hyperlipidemia    • Hypertension    • MITCH (stress urinary incontinence, female)      Past Surgical History:  Past Surgical History:   Procedure Laterality Date   • BUNIONECTOMY Right    • BUNIONECTOMY Left     DR. BONITA RICHARDSON   • CHOLECYSTECTOMY N/A     DR. TOIN GARCIA   • CYSTOSCOPY N/A 2017    DR. STANLEY MILLER   • ENDOMETRIAL ABLATION N/A    • ENDOSCOPY N/A 2009    CHRONIC ACTIVE GASTRITIS, DR. GLORY PACHECO AT [facility]   • TUBAL ABDOMINAL LIGATION Bilateral    • VAGINAL DELIVERY N/A 2002    DR. KOSTA KATZ AT [place]   • VAGINAL DELIVERY N/A 1999      Home Meds:  No medications prior to admission.     Current Meds:   No current facility-administered medications for this encounter.    Current Outpatient Medications:   •  aspirin 81 MG EC tablet, Take 81 mg by mouth Daily. INSTRUCTED PT TO FOLLOW MD INSTRUCTIONS REGARDING HOLDING FOR SURGERY, Disp: , Rfl:   •  B Complex-C-E-Zn (B COMPLEX-C-E-ZINC) tablet, Take 1 tablet by mouth Daily. HOLD PRIOR TO SURG, Disp: , Rfl:   •  cephalexin (KEFLEX) 500 MG capsule, Take 1 capsule by mouth As Needed (after intercourse)., Disp: 30 capsule, Rfl: 3  •  cetirizine (ZyrTEC) 10 MG tablet, Take 10 mg by mouth daily., Disp: , Rfl:   •  cholecalciferol (VITAMIN  D3) 1000 UNITS tablet, Take 1,000 Units by mouth daily., Disp: , Rfl:   •  clobetasol (TEMOVATE) 0.05 % ointment, apply to vulva twice per day, Disp: 60 g, Rfl: 2  •  docusate sodium (COLACE) 100 MG capsule, Take 1 capsule by mouth 2 (Two) Times a Day., Disp: 60 capsule, Rfl: 3  •  ibuprofen (ADVIL,MOTRIN) 400 MG tablet, Take one tablet by mouth every four hours as needed for pain., Disp: 40 tablet, Rfl: 0  •  losartan (COZAAR) 50 MG tablet, Take 1 tablet by mouth Daily., Disp: 90 tablet, Rfl: 3  •  multivitamin (DAILY IRENE) tablet tablet, Take 1 tablet by mouth Daily. HOLD PRIOR TO SURG, Disp: , Rfl:   •  nitrofurantoin, macrocrystal-monohydrate, (MACROBID) 100 MG capsule, Take 1 capsule by mouth 2 (Two) Times a Day., Disp: 6 capsule, Rfl: 0  •  Omega-3 Fatty Acids (FISH OIL) 1000 MG capsule capsule, Take 1,000 mg by mouth Daily With Breakfast. HOLD PRIOR TO SURG, Disp: , Rfl:   •  oxyCODONE-acetaminophen (PERCOCET) 5-325 MG per tablet, Take 1-2 tablets by Mouth every four to six hours as needed for pain. Not to exceed 6 in a 24 hour period., Disp: 20 tablet, Rfl: 0  •  phenazopyridine (PYRIDIUM) 200 MG tablet, Take 1 tablet by mouth 3 (Three) Times a Day for 10 days., Disp: 30 tablet, Rfl: 0  •  promethazine (PHENERGAN) 25 MG tablet, Take 0.5 to 1 tablets by mouth every 4 hours  as needed for nausea, Disp: 20 tablet, Rfl: 0  Allergies:  Allergies   Allergen Reactions   • Bactrim [Sulfamethoxazole-Trimethoprim] Diarrhea     patient states she does not want to ever take Bactrim again     Immunizations:  Immunization History   Administered Date(s) Administered   • COVID-19 (PFIZER) 01/04/2021, 01/25/2021, 10/02/2021   • Influenza, Unspecified 11/24/2020   • Tdap 05/30/2017     Social History:   Social History     Tobacco Use   • Smoking status: Never Smoker   • Smokeless tobacco: Never Used   Substance Use Topics   • Alcohol use: No      Family History:  Family History   Problem Relation Age of Onset   • Heart attack  Mother          age 69   • Stroke Mother    • Hypertension Mother    • Diabetes Mother    • Heart attack Father          age 71   • Hypertension Father    • Stroke Father    • Schizophrenia Brother    • Mental illness Brother    • Breast cancer Neg Hx    • Ovarian cancer Neg Hx    • Uterine cancer Neg Hx    • Colon cancer Neg Hx    • Malig Hyperthermia Neg Hx         Review of Systems  Constitutional:  Denies fever or chills   Eyes:  Denies change in visual acuity   HEENT:  Denies nasal congestion or sore throat   Respiratory:  Denies cough or shortness of breath   Cardiovascular:  Denies chest pain or edema   GI:  Denies abdominal pain, nausea, vomiting, bloody stools or diarrhea   :  Denies dysuria   Musculoskeletal:  Denies back pain or joint pain   Integument:  Denies rash   Neurologic:  Denies headache, focal weakness or sensory changes   Endocrine:  Denies polyuria or polydipsia   Lymphatic:  Denies swollen glands   Psychiatric:  Denies depression or anxiety       Objective:  tMax 24 hrs: Temp (24hrs), Av.3 °F (36.3 °C), Min:97.3 °F (36.3 °C), Max:97.3 °F (36.3 °C)    Vitals Ranges:   Temp:  [97.3 °F (36.3 °C)] 97.3 °F (36.3 °C)  Heart Rate:  [68] 68  Resp:  [16] 16  BP: (132)/(79) 132/79    Exam:  Vital Signs: LMP 10/21/2021   Constitutional:  Well developed, well nourished, no acute distress, non-toxic appearance    GI:  Soft, nondistended, normal bowel sounds, nontender, no organomegaly, no mass, no rebound, no guarding   :  No costovertebral angle tenderness   Musculoskeletal:  No edema, no tenderness, no deformities. Back- no tenderness  Integument:  Well hydrated, no rash   Lymphatic:  No lymphadenopathy noted   Neurologic:  Alert & oriented x 3,  Pelvic:     POPQ  -1  -1  -7    4  2  12  -1  -1  -11    LPP 63     PFS 27 m/s  Void 700  pvr  5    Assessment:    Female stress incontinence    Plan:  Pubovaginal sling  Insertion of vaginal graft  cystourethroscopy    11/3/2021    Tiffanie VASQUEZ  MD Anup  11/4/2021

## 2021-11-05 ENCOUNTER — ANESTHESIA (OUTPATIENT)
Dept: PERIOP | Facility: HOSPITAL | Age: 51
End: 2021-11-05

## 2021-11-05 ENCOUNTER — ANESTHESIA EVENT (OUTPATIENT)
Dept: PERIOP | Facility: HOSPITAL | Age: 51
End: 2021-11-05

## 2021-11-05 ENCOUNTER — HOSPITAL ENCOUNTER (OUTPATIENT)
Facility: HOSPITAL | Age: 51
Setting detail: HOSPITAL OUTPATIENT SURGERY
Discharge: HOME | End: 2021-11-05
Attending: OBSTETRICS & GYNECOLOGY | Admitting: OBSTETRICS & GYNECOLOGY

## 2021-11-05 VITALS
OXYGEN SATURATION: 98 % | DIASTOLIC BLOOD PRESSURE: 76 MMHG | HEART RATE: 80 BPM | SYSTOLIC BLOOD PRESSURE: 121 MMHG | HEIGHT: 66 IN | TEMPERATURE: 98.3 F | BODY MASS INDEX: 22.66 KG/M2 | WEIGHT: 141 LBS | RESPIRATION RATE: 18 BRPM

## 2021-11-05 DIAGNOSIS — L91.8 SKIN TAG: ICD-10-CM

## 2021-11-05 LAB
ABO GROUP BLD: NORMAL
B-HCG UR QL: NEGATIVE
BLD GP AB SCN SERPL QL: NEGATIVE
EXPIRATION DATE: NORMAL
INTERNAL NEGATIVE CONTROL: NORMAL
INTERNAL POSITIVE CONTROL: NORMAL
Lab: NORMAL
RH BLD: POSITIVE
T&S EXPIRATION DATE: NORMAL

## 2021-11-05 PROCEDURE — 25010000002 ONDANSETRON PER 1 MG: Performed by: NURSE ANESTHETIST, CERTIFIED REGISTERED

## 2021-11-05 PROCEDURE — 25010000002 KETOROLAC TROMETHAMINE PER 15 MG: Performed by: NURSE ANESTHETIST, CERTIFIED REGISTERED

## 2021-11-05 PROCEDURE — 86850 RBC ANTIBODY SCREEN: CPT | Performed by: OBSTETRICS & GYNECOLOGY

## 2021-11-05 PROCEDURE — 25010000002 DEXAMETHASONE PER 1 MG: Performed by: NURSE ANESTHETIST, CERTIFIED REGISTERED

## 2021-11-05 PROCEDURE — 25010000002 DROPERIDOL PER 5 MG: Performed by: ANESTHESIOLOGY

## 2021-11-05 PROCEDURE — 81025 URINE PREGNANCY TEST: CPT | Performed by: ANESTHESIOLOGY

## 2021-11-05 PROCEDURE — 25010000002 PROPOFOL 10 MG/ML EMULSION: Performed by: NURSE ANESTHETIST, CERTIFIED REGISTERED

## 2021-11-05 PROCEDURE — 86900 BLOOD TYPING SEROLOGIC ABO: CPT | Performed by: OBSTETRICS & GYNECOLOGY

## 2021-11-05 PROCEDURE — 88304 TISSUE EXAM BY PATHOLOGIST: CPT | Performed by: OBSTETRICS & GYNECOLOGY

## 2021-11-05 PROCEDURE — 86901 BLOOD TYPING SEROLOGIC RH(D): CPT | Performed by: OBSTETRICS & GYNECOLOGY

## 2021-11-05 PROCEDURE — 0 CEFAZOLIN IN DEXTROSE 2-4 GM/100ML-% SOLUTION: Performed by: OBSTETRICS & GYNECOLOGY

## 2021-11-05 PROCEDURE — 25010000002 HYDROMORPHONE PER 4 MG: Performed by: NURSE ANESTHETIST, CERTIFIED REGISTERED

## 2021-11-05 DEVICE — GRFT TISS STRATTICE FIRM 6X10CM: Type: IMPLANTABLE DEVICE | Site: VAGINA | Status: FUNCTIONAL

## 2021-11-05 RX ORDER — SODIUM CHLORIDE 0.9 % (FLUSH) 0.9 %
3 SYRINGE (ML) INJECTION EVERY 12 HOURS SCHEDULED
Status: DISCONTINUED | OUTPATIENT
Start: 2021-11-05 | End: 2021-11-05 | Stop reason: HOSPADM

## 2021-11-05 RX ORDER — HYDROCODONE BITARTRATE AND ACETAMINOPHEN 7.5; 325 MG/1; MG/1
1 TABLET ORAL ONCE AS NEEDED
Status: DISCONTINUED | OUTPATIENT
Start: 2021-11-05 | End: 2021-11-05 | Stop reason: HOSPADM

## 2021-11-05 RX ORDER — DIPHENHYDRAMINE HCL 25 MG
25 CAPSULE ORAL
Status: DISCONTINUED | OUTPATIENT
Start: 2021-11-05 | End: 2021-11-05 | Stop reason: HOSPADM

## 2021-11-05 RX ORDER — SODIUM CHLORIDE 0.9 % (FLUSH) 0.9 %
10 SYRINGE (ML) INJECTION AS NEEDED
Status: DISCONTINUED | OUTPATIENT
Start: 2021-11-05 | End: 2021-11-05 | Stop reason: HOSPADM

## 2021-11-05 RX ORDER — PROMETHAZINE HYDROCHLORIDE 25 MG/1
25 SUPPOSITORY RECTAL ONCE AS NEEDED
Status: COMPLETED | OUTPATIENT
Start: 2021-11-05 | End: 2021-11-05

## 2021-11-05 RX ORDER — ONDANSETRON 2 MG/ML
4 INJECTION INTRAMUSCULAR; INTRAVENOUS ONCE AS NEEDED
Status: COMPLETED | OUTPATIENT
Start: 2021-11-05 | End: 2021-11-05

## 2021-11-05 RX ORDER — LIDOCAINE HYDROCHLORIDE 10 MG/ML
0.5 INJECTION, SOLUTION EPIDURAL; INFILTRATION; INTRACAUDAL; PERINEURAL ONCE AS NEEDED
Status: DISCONTINUED | OUTPATIENT
Start: 2021-11-05 | End: 2021-11-05 | Stop reason: HOSPADM

## 2021-11-05 RX ORDER — SODIUM CHLORIDE 0.9 % (FLUSH) 0.9 %
3-10 SYRINGE (ML) INJECTION AS NEEDED
Status: DISCONTINUED | OUTPATIENT
Start: 2021-11-05 | End: 2021-11-05 | Stop reason: HOSPADM

## 2021-11-05 RX ORDER — ONDANSETRON 2 MG/ML
INJECTION INTRAMUSCULAR; INTRAVENOUS AS NEEDED
Status: DISCONTINUED | OUTPATIENT
Start: 2021-11-05 | End: 2021-11-05 | Stop reason: SURG

## 2021-11-05 RX ORDER — HYDROMORPHONE HCL 110MG/55ML
PATIENT CONTROLLED ANALGESIA SYRINGE INTRAVENOUS AS NEEDED
Status: DISCONTINUED | OUTPATIENT
Start: 2021-11-05 | End: 2021-11-05 | Stop reason: SURG

## 2021-11-05 RX ORDER — MAGNESIUM HYDROXIDE 1200 MG/15ML
LIQUID ORAL AS NEEDED
Status: DISCONTINUED | OUTPATIENT
Start: 2021-11-05 | End: 2021-11-05 | Stop reason: HOSPADM

## 2021-11-05 RX ORDER — IBUPROFEN 600 MG/1
600 TABLET ORAL ONCE AS NEEDED
Status: DISCONTINUED | OUTPATIENT
Start: 2021-11-05 | End: 2021-11-05 | Stop reason: HOSPADM

## 2021-11-05 RX ORDER — DIPHENHYDRAMINE HYDROCHLORIDE 50 MG/ML
12.5 INJECTION INTRAMUSCULAR; INTRAVENOUS
Status: DISCONTINUED | OUTPATIENT
Start: 2021-11-05 | End: 2021-11-05 | Stop reason: HOSPADM

## 2021-11-05 RX ORDER — SCOLOPAMINE TRANSDERMAL SYSTEM 1 MG/1
1 PATCH, EXTENDED RELEASE TRANSDERMAL ONCE
Status: DISCONTINUED | OUTPATIENT
Start: 2021-11-05 | End: 2021-11-05 | Stop reason: HOSPADM

## 2021-11-05 RX ORDER — FLUMAZENIL 0.1 MG/ML
0.2 INJECTION INTRAVENOUS AS NEEDED
Status: DISCONTINUED | OUTPATIENT
Start: 2021-11-05 | End: 2021-11-05 | Stop reason: HOSPADM

## 2021-11-05 RX ORDER — FAMOTIDINE 10 MG/ML
20 INJECTION, SOLUTION INTRAVENOUS ONCE
Status: COMPLETED | OUTPATIENT
Start: 2021-11-05 | End: 2021-11-05

## 2021-11-05 RX ORDER — OXYCODONE AND ACETAMINOPHEN 10; 325 MG/1; MG/1
1 TABLET ORAL EVERY 4 HOURS PRN
Status: DISCONTINUED | OUTPATIENT
Start: 2021-11-05 | End: 2021-11-05 | Stop reason: HOSPADM

## 2021-11-05 RX ORDER — KETOROLAC TROMETHAMINE 30 MG/ML
INJECTION, SOLUTION INTRAMUSCULAR; INTRAVENOUS AS NEEDED
Status: DISCONTINUED | OUTPATIENT
Start: 2021-11-05 | End: 2021-11-05 | Stop reason: SURG

## 2021-11-05 RX ORDER — BUPIVACAINE HYDROCHLORIDE AND EPINEPHRINE 2.5; 5 MG/ML; UG/ML
INJECTION, SOLUTION INFILTRATION; PERINEURAL AS NEEDED
Status: DISCONTINUED | OUTPATIENT
Start: 2021-11-05 | End: 2021-11-05 | Stop reason: HOSPADM

## 2021-11-05 RX ORDER — HYDRALAZINE HYDROCHLORIDE 20 MG/ML
5 INJECTION INTRAMUSCULAR; INTRAVENOUS
Status: DISCONTINUED | OUTPATIENT
Start: 2021-11-05 | End: 2021-11-05 | Stop reason: HOSPADM

## 2021-11-05 RX ORDER — FENTANYL CITRATE 50 UG/ML
50 INJECTION, SOLUTION INTRAMUSCULAR; INTRAVENOUS
Status: DISCONTINUED | OUTPATIENT
Start: 2021-11-05 | End: 2021-11-05 | Stop reason: HOSPADM

## 2021-11-05 RX ORDER — PROPOFOL 10 MG/ML
VIAL (ML) INTRAVENOUS AS NEEDED
Status: DISCONTINUED | OUTPATIENT
Start: 2021-11-05 | End: 2021-11-05 | Stop reason: SURG

## 2021-11-05 RX ORDER — PROMETHAZINE HYDROCHLORIDE 25 MG/1
25 TABLET ORAL ONCE AS NEEDED
Status: COMPLETED | OUTPATIENT
Start: 2021-11-05 | End: 2021-11-05

## 2021-11-05 RX ORDER — CEFAZOLIN SODIUM 2 G/100ML
2 INJECTION, SOLUTION INTRAVENOUS ONCE
Status: COMPLETED | OUTPATIENT
Start: 2021-11-05 | End: 2021-11-05

## 2021-11-05 RX ORDER — SODIUM CHLORIDE, SODIUM LACTATE, POTASSIUM CHLORIDE, CALCIUM CHLORIDE 600; 310; 30; 20 MG/100ML; MG/100ML; MG/100ML; MG/100ML
9 INJECTION, SOLUTION INTRAVENOUS CONTINUOUS
Status: DISCONTINUED | OUTPATIENT
Start: 2021-11-05 | End: 2021-11-05 | Stop reason: HOSPADM

## 2021-11-05 RX ORDER — DROPERIDOL 2.5 MG/ML
0.62 INJECTION, SOLUTION INTRAMUSCULAR; INTRAVENOUS ONCE
Status: COMPLETED | OUTPATIENT
Start: 2021-11-05 | End: 2021-11-05

## 2021-11-05 RX ORDER — LIDOCAINE HYDROCHLORIDE 20 MG/ML
INJECTION, SOLUTION INFILTRATION; PERINEURAL AS NEEDED
Status: DISCONTINUED | OUTPATIENT
Start: 2021-11-05 | End: 2021-11-05 | Stop reason: SURG

## 2021-11-05 RX ORDER — MIDAZOLAM HYDROCHLORIDE 1 MG/ML
1 INJECTION INTRAMUSCULAR; INTRAVENOUS
Status: DISCONTINUED | OUTPATIENT
Start: 2021-11-05 | End: 2021-11-05 | Stop reason: HOSPADM

## 2021-11-05 RX ORDER — HYDROMORPHONE HYDROCHLORIDE 1 MG/ML
0.5 INJECTION, SOLUTION INTRAMUSCULAR; INTRAVENOUS; SUBCUTANEOUS
Status: DISCONTINUED | OUTPATIENT
Start: 2021-11-05 | End: 2021-11-05 | Stop reason: HOSPADM

## 2021-11-05 RX ORDER — DEXAMETHASONE SODIUM PHOSPHATE 10 MG/ML
INJECTION INTRAMUSCULAR; INTRAVENOUS AS NEEDED
Status: DISCONTINUED | OUTPATIENT
Start: 2021-11-05 | End: 2021-11-05 | Stop reason: SURG

## 2021-11-05 RX ORDER — LABETALOL HYDROCHLORIDE 5 MG/ML
5 INJECTION, SOLUTION INTRAVENOUS
Status: DISCONTINUED | OUTPATIENT
Start: 2021-11-05 | End: 2021-11-05 | Stop reason: HOSPADM

## 2021-11-05 RX ORDER — EPHEDRINE SULFATE 50 MG/ML
5 INJECTION, SOLUTION INTRAVENOUS ONCE AS NEEDED
Status: DISCONTINUED | OUTPATIENT
Start: 2021-11-05 | End: 2021-11-05 | Stop reason: HOSPADM

## 2021-11-05 RX ORDER — PHENAZOPYRIDINE HYDROCHLORIDE 200 MG/1
200 TABLET, FILM COATED ORAL ONCE
Status: COMPLETED | OUTPATIENT
Start: 2021-11-05 | End: 2021-11-05

## 2021-11-05 RX ORDER — NALOXONE HCL 0.4 MG/ML
0.2 VIAL (ML) INJECTION AS NEEDED
Status: DISCONTINUED | OUTPATIENT
Start: 2021-11-05 | End: 2021-11-05 | Stop reason: HOSPADM

## 2021-11-05 RX ADMIN — PHENAZOPYRIDINE 200 MG: 200 TABLET ORAL at 13:16

## 2021-11-05 RX ADMIN — PROPOFOL 150 MG: 10 INJECTION, EMULSION INTRAVENOUS at 15:25

## 2021-11-05 RX ADMIN — ONDANSETRON 4 MG: 2 INJECTION INTRAMUSCULAR; INTRAVENOUS at 17:52

## 2021-11-05 RX ADMIN — SODIUM CHLORIDE, POTASSIUM CHLORIDE, SODIUM LACTATE AND CALCIUM CHLORIDE: 600; 310; 30; 20 INJECTION, SOLUTION INTRAVENOUS at 16:30

## 2021-11-05 RX ADMIN — KETOROLAC TROMETHAMINE 30 MG: 30 INJECTION, SOLUTION INTRAMUSCULAR at 17:11

## 2021-11-05 RX ADMIN — HYDROMORPHONE HYDROCHLORIDE 0.5 MG: 2 INJECTION, SOLUTION INTRAMUSCULAR; INTRAVENOUS; SUBCUTANEOUS at 15:22

## 2021-11-05 RX ADMIN — PROMETHAZINE HYDROCHLORIDE 25 MG: 25 SUPPOSITORY RECTAL at 18:28

## 2021-11-05 RX ADMIN — ONDANSETRON 4 MG: 2 INJECTION INTRAMUSCULAR; INTRAVENOUS at 17:11

## 2021-11-05 RX ADMIN — LIDOCAINE HYDROCHLORIDE 60 MG: 20 INJECTION, SOLUTION INFILTRATION; PERINEURAL at 15:25

## 2021-11-05 RX ADMIN — HYDROMORPHONE HYDROCHLORIDE 0.5 MG: 2 INJECTION, SOLUTION INTRAMUSCULAR; INTRAVENOUS; SUBCUTANEOUS at 17:09

## 2021-11-05 RX ADMIN — DEXAMETHASONE SODIUM PHOSPHATE 8 MG: 10 INJECTION INTRAMUSCULAR; INTRAVENOUS at 15:32

## 2021-11-05 RX ADMIN — CEFAZOLIN SODIUM 2 G: 2 INJECTION, SOLUTION INTRAVENOUS at 15:11

## 2021-11-05 RX ADMIN — SODIUM CHLORIDE, POTASSIUM CHLORIDE, SODIUM LACTATE AND CALCIUM CHLORIDE 9 ML/HR: 600; 310; 30; 20 INJECTION, SOLUTION INTRAVENOUS at 13:40

## 2021-11-05 RX ADMIN — FAMOTIDINE 20 MG: 10 INJECTION INTRAVENOUS at 13:39

## 2021-11-05 RX ADMIN — DROPERIDOL 0.62 MG: 2.5 INJECTION, SOLUTION INTRAMUSCULAR; INTRAVENOUS at 19:00

## 2021-11-05 NOTE — ANESTHESIA PROCEDURE NOTES
Airway  Urgency: elective    Date/Time: 11/5/2021 3:26 PM    General Information and Staff    Patient location during procedure: OR  Anesthesiologist: Nico Srivastava MD  CRNA: Kory Mariee CRNA    Indications and Patient Condition  Indications for airway management: airway protection    Preoxygenated: yes  MILS maintained throughout  Mask difficulty assessment: 1 - vent by mask    Final Airway Details  Final airway type: supraglottic airway      Successful airway: unique  Size 4    Number of attempts at approach: 1  Assessment: lips, teeth, and gum same as pre-op and atraumatic intubation

## 2021-11-05 NOTE — ANESTHESIA PREPROCEDURE EVALUATION
Anesthesia Evaluation     Patient summary reviewed and Nursing notes reviewed                Airway   Mallampati: II  TM distance: >3 FB  Neck ROM: full  No difficulty expected  Dental - normal exam     Pulmonary - normal exam   Cardiovascular - normal exam    ECG reviewed    (+) hypertension less than 2 medications, hyperlipidemia,     ROS comment: ECG suggestive of old MI, but no hx of angina, good exercise tolerance, pt feels may be just lead placement and I agree.    Neuro/Psych  GI/Hepatic/Renal/Endo    (+)   diabetes mellitus,     ROS Comment: Gestational DM    Musculoskeletal     (+) chronic pain,       ROS comment: Bilateral chronic knee pain  Abdominal  - normal exam   Substance History      OB/GYN          Other                        Anesthesia Plan    ASA 2     general     intravenous induction     Anesthetic plan, all risks, benefits, and alternatives have been provided, discussed and informed consent has been obtained with: patient.    Plan discussed with CRNA.

## 2021-11-05 NOTE — ANESTHESIA POSTPROCEDURE EVALUATION
Patient: Agnes Hayes    Procedure Summary     Date: 11/05/21 Room / Location: [facility] OR [address] [place] [address] MAIN OR    Anesthesia Start: 1517 Anesthesia Stop: 1726    Procedure: PUBO VAGINAL SLING INSERTION OF VAGINAL GRAFT CYSTORETHROSCOPY, AND REMOVAL OF PERIANAL SKIN TAGS (N/A Vagina) Diagnosis:     Surgeons: Tiffanie Hebert MD Provider: Nico Srivastava MD    Anesthesia Type: general ASA Status: 2          Anesthesia Type: general    Vitals  Vitals Value Taken Time   /71 11/05/21 1845   Temp     Pulse 78 11/05/21 1850   Resp 16 11/05/21 1845   SpO2 99 % 11/05/21 1851   Vitals shown include unvalidated device data.        Post Anesthesia Care and Evaluation    Patient location during evaluation: PHASE II  Patient participation: complete - patient participated  Level of consciousness: awake and alert  Pain management: adequate  Airway patency: patent  Anesthetic complications: No anesthetic complications  PONV Status: none  Cardiovascular status: acceptable and hemodynamically stable  Respiratory status: acceptable, nonlabored ventilation and spontaneous ventilation  Hydration status: acceptable

## 2021-11-06 NOTE — PERIOPERATIVE NURSING NOTE
Patient ambulated to bathroom at 1918  Patient voided 50mL  At 1925 patient had a post void residual of 450  Dr. Hebert called for plan.  Patient sent home with catheter.

## 2021-11-09 ENCOUNTER — HOSPITAL ENCOUNTER (EMERGENCY)
Facility: HOSPITAL | Age: 51
Discharge: HOME | End: 2021-11-09
Attending: EMERGENCY MEDICINE | Admitting: EMERGENCY MEDICINE

## 2021-11-09 VITALS
BODY MASS INDEX: 22.76 KG/M2 | DIASTOLIC BLOOD PRESSURE: 93 MMHG | OXYGEN SATURATION: 94 % | RESPIRATION RATE: 18 BRPM | HEIGHT: 66 IN | HEART RATE: 97 BPM | SYSTOLIC BLOOD PRESSURE: 143 MMHG | TEMPERATURE: 97.1 F

## 2021-11-09 DIAGNOSIS — I10 PRIMARY HYPERTENSION: ICD-10-CM

## 2021-11-09 DIAGNOSIS — R33.8 ACUTE URINARY RETENTION: Primary | ICD-10-CM

## 2021-11-09 DIAGNOSIS — Z98.890 HISTORY OF BLADDER SURGERY: ICD-10-CM

## 2021-11-09 LAB
BACTERIA UR QL AUTO: NORMAL /HPF
BILIRUB UR QL STRIP: NEGATIVE
CLARITY UR: CLEAR
COLOR UR: ABNORMAL
GLUCOSE UR STRIP-MCNC: NEGATIVE MG/DL
HGB UR QL STRIP.AUTO: NEGATIVE
HYALINE CASTS UR QL AUTO: NORMAL /LPF
KETONES UR QL STRIP: NEGATIVE
LAB AP CASE REPORT: NORMAL
LEUKOCYTE ESTERASE UR QL STRIP.AUTO: ABNORMAL
NITRITE UR QL STRIP: POSITIVE
PATH REPORT.FINAL DX SPEC: NORMAL
PATH REPORT.GROSS SPEC: NORMAL
PH UR STRIP.AUTO: 7 [PH] (ref 5–8)
PROT UR QL STRIP: NEGATIVE
RBC # UR: NORMAL /HPF
REF LAB TEST METHOD: NORMAL
SP GR UR STRIP: 1.01 (ref 1–1.03)
SQUAMOUS #/AREA URNS HPF: NORMAL /HPF
UROBILINOGEN UR QL STRIP: ABNORMAL
WBC UR QL AUTO: NORMAL /HPF

## 2021-11-09 PROCEDURE — 51702 INSERT TEMP BLADDER CATH: CPT

## 2021-11-09 PROCEDURE — 51798 US URINE CAPACITY MEASURE: CPT

## 2021-11-09 PROCEDURE — 81001 URINALYSIS AUTO W/SCOPE: CPT | Performed by: PHYSICIAN ASSISTANT

## 2021-11-09 PROCEDURE — 99283 EMERGENCY DEPT VISIT LOW MDM: CPT

## 2021-11-10 NOTE — DISCHARGE INSTRUCTIONS
Home, rest, home medicine as prescribed, keep well-hydrated, follow up with your surgeon and PCP for recheck. Return to care with further concerns.

## 2021-11-10 NOTE — ED PROVIDER NOTES
EMERGENCY DEPARTMENT ENCOUNTER    Room Number:  A01/01  Date of encounter:  11/9/2021  PCP: Sophia Sullivan MD  Historian: Patient      HPI:  Chief Complaint: urinary retention       Context: Agnes Hayes is a 51 y.o. female with past medical history of HTN, HLD and s/p bladder sling surgery on Friday, 11/5/2021, by Dr. Tiffanie Hebert who presents to the ED c/o severe pelvic pain and difficulty urinating.  Patient states that she had surgery on Friday, was discharged home with a Mann catheter, had the catheter removed yesterday, and since that time has only been urinating small amounts, dribbling, and having increasing pain and urgency to urinate since that time.  Denies any fever, nausea, vomiting, chest pain, or shortness of breath.  Patient is currently on Pyridium and Macrobid.      PAST MEDICAL HISTORY  Active Ambulatory Problems     Diagnosis Date Noted   • Hypertension 05/24/2016   • Hyperlipidemia 05/24/2016   • Slow transit constipation 05/24/2016   • Family history of heart disease 05/24/2016   • Recurrent UTI 08/10/2018   • Incontinence in female 08/10/2018   • Hemorrhoids 07/24/2020   • Family history of diabetes mellitus 07/24/2020   • Screening for colorectal cancer 09/08/2020   • Female stress incontinence 11/04/2021     Resolved Ambulatory Problems     Diagnosis Date Noted   • Migraine 05/24/2016   • Weight gain 07/24/2020   • Constipation      Past Medical History:   Diagnosis Date   • Abnormal mammogram 07/2018   • Bilateral chronic knee pain    • Breast asymmetry    • Elevated glucose 07/2019   • Frequent UTI    • Gestational diabetes mellitus    • MITCH (stress urinary incontinence, female)          PAST SURGICAL HISTORY  Past Surgical History:   Procedure Laterality Date   • BUNIONECTOMY Right 2008   • BUNIONECTOMY Left 2010    DR. BONITA RICHARDSON   • CHOLECYSTECTOMY N/A 2003    DR. TONI GARCIA   • CYSTOSCOPY N/A 08/2017    DR. STANLEY MILLER   • ENDOMETRIAL ABLATION N/A 2007   • ENDOSCOPY N/A  2009    CHRONIC ACTIVE GASTRITIS, DR. GLORY PACHECO AT [facility]   • PUBOVAGINAL SLING N/A 2021    Procedure: PUBO VAGINAL SLING INSERTION OF VAGINAL GRAFT CYSTORETHROSCOPY, AND REMOVAL OF PERIANAL SKIN TAGS;  Surgeon: Tiffanie Hebert MD;  Location: [facility] MAIN OR;  Service: Gynecology;  Laterality: N/A;   • TUBAL ABDOMINAL LIGATION Bilateral    • VAGINAL DELIVERY N/A 2002    DR. KOSTA KATZ AT [place]   • VAGINAL DELIVERY N/A 1999         FAMILY HISTORY  Family History   Problem Relation Age of Onset   • Heart attack Mother          age 69   • Stroke Mother    • Hypertension Mother    • Diabetes Mother    • Heart attack Father          age 71   • Hypertension Father    • Stroke Father    • Schizophrenia Brother    • Mental illness Brother    • Breast cancer Neg Hx    • Ovarian cancer Neg Hx    • Uterine cancer Neg Hx    • Colon cancer Neg Hx    • Malig Hyperthermia Neg Hx          SOCIAL HISTORY  Social History     Socioeconomic History   • Marital status:    Tobacco Use   • Smoking status: Never Smoker   • Smokeless tobacco: Never Used   Substance and Sexual Activity   • Alcohol use: No   • Drug use: No   • Sexual activity: Yes     Partners: Male     Birth control/protection: Surgical     Comment: tubal. .         ALLERGIES  Bactrim [sulfamethoxazole-trimethoprim]        REVIEW OF SYSTEMS  Review of Systems     All systems reviewed and negative except for those discussed in HPI.       PHYSICAL EXAM    I have reviewed the triage vital signs and nursing notes.    ED Triage Vitals [21]   Temp Heart Rate Resp BP SpO2   97.1 °F (36.2 °C) 97 18 143/93 94 %      Temp src Heart Rate Source Patient Position BP Location FiO2 (%)   Temporal Monitor Sitting Right arm --       Physical Exam  GENERAL: Alert and oriented x3, moderate distress   HENT: mask in place  EYES: no scleral icterus, PERRL, EOMI  CV: regular rhythm, regular rate  RESPIRATORY: normal effort  ABDOMEN:  Moderate suprapubic tenderness with guarding, no rebound  MUSCULOSKELETAL: no deformity  NEURO: alert, moves all extremities, follows commands  SKIN: warm, dry        LAB RESULTS  Recent Results (from the past 24 hour(s))   Urinalysis With Microscopic If Indicated (No Culture) - Urine, Catheter    Collection Time: 11/09/21  8:23 PM    Specimen: Urine, Catheter   Result Value Ref Range    Color, UA Dark Yellow (A) Yellow, Straw    Appearance, UA Clear Clear    pH, UA 7.0 5.0 - 8.0    Specific Gravity, UA 1.006 1.005 - 1.030    Glucose, UA Negative Negative    Ketones, UA Negative Negative    Bilirubin, UA Negative Negative    Blood, UA Negative Negative    Protein, UA Negative Negative    Leuk Esterase, UA Trace (A) Negative    Nitrite, UA Positive (A) Negative    Urobilinogen, UA 1.0 E.U./dL 0.2 - 1.0 E.U./dL   Urinalysis, Microscopic Only - Urine, Catheter    Collection Time: 11/09/21  8:23 PM    Specimen: Urine, Catheter   Result Value Ref Range    RBC, UA 0-2 None Seen, 0-2 /HPF    WBC, UA 0-2 None Seen, 0-2 /HPF    Bacteria, UA None Seen None Seen /HPF    Squamous Epithelial Cells, UA None Seen None Seen, 0-2 /HPF    Hyaline Casts, UA None Seen None Seen /LPF    Methodology Automated Microscopy        Ordered the above labs and independently reviewed the results.        RADIOLOGY  No Radiology Exams Resulted Within Past 24 Hours    I ordered the above noted radiological studies. Reviewed by me and discussed with radiologist.  See dictation for official radiology interpretation.      PROCEDURES    Procedures      MEDICATIONS GIVEN IN ER    Medications - No data to display      PROGRESS, DATA ANALYSIS, CONSULTS, AND MEDICAL DECISION MAKING    All labs have been independently reviewed by me.  All radiology studies have been reviewed by me and discussed with radiologist dictating the report.   EKG's independently viewed and interpreted by me.  Discussion below represents my analysis of pertinent findings related to  patient's condition, differential diagnosis, treatment plan and final disposition.    DDx: Includes but not limited to acute urinary retention, UTI    ED Course as of 11/09/21 2206   Tue Nov 09, 2021 2020 Bladder scan revealed over 700 mL of urine in the bladder. [ANDRE]   2037 Patient rechecked, has approximately 1400 mL present in her Mann.  Patient feeling much improved, abdomen is soft, nontender, nondistended.  Discussed need for follow-up with her surgeon, patient expressed understanding and agrees with plan. [ANDRE]   2115 WBC, UA: 0-2 [ANDRE]   2115 Bacteria, UA: None Seen [ANDRE]      ED Course User Index  [ANDRE] Casey Richardson PA       MDM: Patient had over 1000 mL of urine in her bladder, a Mann catheter has been placed, and the patient's symptoms are resolved.  There is no evidence for a urinary tract infection.  Will discharge home with instructions on Mann catheter use and need for follow-up with her surgeon.    PPE: The patient wore a surgical mask throughout the entire patient encounter. I wore an N95.    AS OF 22:06 EST VITALS:    BP - 143/93  HR - 97  TEMP - 97.1 °F (36.2 °C) (Temporal)  O2 SATS - 94%        DIAGNOSIS  Final diagnoses:   Acute urinary retention   History of bladder surgery   Primary hypertension         DISPOSITION  DISCHARGE    Patient discharged in stable condition.    Reviewed implications of results, diagnosis, meds, responsibility to follow up, warning signs and symptoms of possible worsening, potential complications and reasons to return to ER.    Patient/Family voiced understanding of above instructions.    Discussed plan for discharge, as there is no emergent indication for admission. Patient referred to primary care provider for BP management due to today's BP. Pt/family is agreeable and understands need for follow up and repeat testing.  Pt is aware that discharge does not mean that nothing is wrong but it indicates no emergency is present that requires admission and they must  continue care with follow-up as given below or physician of their choice.     FOLLOW-UP  Tiffanie Hebert MD  [address] [address]  [phone]    Schedule an appointment as soon as possible for a visit in 2 days           Medication List      No changes were made to your prescriptions during this visit.                    Casey Richardson PA  11/09/21 [id number]

## 2021-11-10 NOTE — ED NOTES
Pt to triage from home with c/o urinary retention. Pt states had bladder surgery Friday, had weaver removed yesterday, has had very little urine output since. Pt states pain, urinary retention.  Pt wearing mask in triage.  Triage personnel wore appropriate PPE       Edda Beck, RN  11/09/21 1959

## 2021-11-15 NOTE — OP NOTE
OPERATIVE REPORT    [facility] MAIN OR    Patient:  Agnes Hayes       :   1970       Date of Operation:  2021    Pre-operative Diagnosis :  1. Urinary, incontinence, stress female N39.3  2. Perianal skin tags K64.4    Post-operative Diagnosis: same     Surgeon:  Tiffanie Hebert MD       Name of Operation/Procedure:   1. Pubovaginal sling, retropubic, porcine, 34306  2. Excision of perianal skin tags times two, 21384  3. Cystourethroscopy      SPECIMENS: Perianal skin tags times two     Findings: On cystourethroscopy there was bilateral efflux of Pyridium-stained urine from both the right and the left ureteral orifices. There were no lesions or foreign material of the bladder or the urethra.     Description of procedure: The patient was taken to the Operating Room with a peripheral IV in place. She underwent the induction of general endotracheal anesthesia, was prepped and draped in the dorsal lithotomy position in [facility]. Cystourethroscopy showed no lesions or foreign material of the bladder or the urethra. There is bilateral efflux of urine from both the right and the left ureteral orifices. The cystoscope was removed and a Mann was placed and set to straight drainage. A sub-urethral incision was made and dissected bilaterally. A sheet of porcine material was used to create a natural biologic porcine sling with lesser risks than synthetic as the biologic sling represents the standard or care prior and the use of a biologic sling is superior to synthetic mesh slings. This 1 centimeter porcine biologic sling was placed via the transvaginal route in the retropubic space. Cystourethroscopy with each transvaginal introducer in place showed no lesions or foreign material of the bladder or the urethra. The pubovaginal sling was adjusted without tension so that a curved Rivas easily fit between the sub-urethral tissue and the sling. The excess suprapubic sling ends were trimmed and the skin lifted  away. These incisions were closed with 4-0 vicryl absorbable suture. With a Mann catheter in place, the sub-urethral incision was closed with 2-0 Vicryl and was hemostatic.  Two perianal skin tags were injected with local anesthetic and excised and sent to pathology. The skin edges were closed with 4-0 Vicryl and were hemostatic. The catheter was removed and the patient was taken out of the dorsal   lithotomy position, awakened from general anesthesia, and taken to the Recovery Room in good condition. There were no complications to the procedures. All final needle, sponge, and instrument counts were reported as correct.        Estimated Blood Loss:  50 mL               Tiffanie Hebert MD, MSc, FACOG, FACS

## 2021-11-16 ENCOUNTER — APPOINTMENT (OUTPATIENT)
Dept: MAMMOGRAPHY | Facility: HOSPITAL | Age: 51
End: 2021-11-16

## 2021-12-14 ENCOUNTER — HOSPITAL ENCOUNTER (OUTPATIENT)
Dept: MAMMOGRAPHY | Facility: HOSPITAL | Age: 51
Discharge: HOME | End: 2021-12-14
Admitting: INTERNAL MEDICINE

## 2021-12-14 DIAGNOSIS — Z12.31 BREAST CANCER SCREENING BY MAMMOGRAM: ICD-10-CM

## 2021-12-14 PROCEDURE — 77063 BREAST TOMOSYNTHESIS BI: CPT

## 2021-12-14 PROCEDURE — 77067 SCR MAMMO BI INCL CAD: CPT

## 2021-12-23 ENCOUNTER — TRANSCRIBE ORDERS (OUTPATIENT)
Dept: ADMINISTRATIVE | Facility: HOSPITAL | Age: 51
End: 2021-12-23

## 2021-12-23 DIAGNOSIS — Z12.31 VISIT FOR SCREENING MAMMOGRAM: Primary | ICD-10-CM

## 2022-02-11 ENCOUNTER — OFFICE VISIT (OUTPATIENT)
Dept: ORTHOPEDIC SURGERY | Facility: CLINIC | Age: 52
End: 2022-02-11

## 2022-02-11 VITALS — TEMPERATURE: 98.7 F | HEIGHT: 66 IN | WEIGHT: 140 LBS | BODY MASS INDEX: 22.5 KG/M2

## 2022-02-11 DIAGNOSIS — M25.562 ACUTE BILATERAL KNEE PAIN: Primary | ICD-10-CM

## 2022-02-11 DIAGNOSIS — R52 PAIN: ICD-10-CM

## 2022-02-11 DIAGNOSIS — M25.561 ACUTE BILATERAL KNEE PAIN: Primary | ICD-10-CM

## 2022-02-11 PROCEDURE — 73562 X-RAY EXAM OF KNEE 3: CPT | Performed by: NURSE PRACTITIONER

## 2022-02-11 PROCEDURE — 99214 OFFICE O/P EST MOD 30 MIN: CPT | Performed by: NURSE PRACTITIONER

## 2022-02-11 NOTE — PROGRESS NOTES
Patient: Agnes Hayes  YOB: 1970 51 y.o. female  Medical Record Number: [id number]    Chief Complaints:   Chief Complaint   Patient presents with   • Right Knee - Follow-up       History of Present Illness:Agnes Hayes is a 51 y.o. female who presents with complaints of worsening in bilateral knee pain.  The patient was seen previously 2 years ago for knee pain secondary to patellofemoral arthritis.  She was referred to outpatient physical therapy and had been feeling much better just a few weeks ago was going up and down the stairs a lot and her knees became painful.  She has tried over-the-counter anti-inflammatories with minimal improvement    Allergies:   Allergies   Allergen Reactions   • Bactrim [Sulfamethoxazole-Trimethoprim] Diarrhea     patient states she does not want to ever take Bactrim again       Medications:   Current Outpatient Medications   Medication Sig Dispense Refill   • aspirin 81 MG EC tablet Take 81 mg by mouth Daily. INSTRUCTED PT TO FOLLOW MD INSTRUCTIONS REGARDING HOLDING FOR SURGERY     • B Complex-C-E-Zn (B COMPLEX-C-E-ZINC) tablet Take 1 tablet by mouth Daily. HOLD PRIOR TO SURG     • cephalexin (KEFLEX) 500 MG capsule Take 1 capsule by mouth As Needed (after intercourse). 30 capsule 3   • cetirizine (ZyrTEC) 10 MG tablet Take 10 mg by mouth daily.     • cholecalciferol (VITAMIN D3) 1000 UNITS tablet Take 1,000 Units by mouth daily.     • docusate sodium (COLACE) 100 MG capsule Take 1 capsule by mouth 2 (Two) Times a Day. 60 capsule 3   • ibuprofen (ADVIL,MOTRIN) 400 MG tablet Take one tablet by mouth every four hours as needed for pain. 40 tablet 0   • losartan (COZAAR) 50 MG tablet Take 1 tablet by mouth Daily. 90 tablet 3   • multivitamin (DAILY IRENE) tablet tablet Take 1 tablet by mouth Daily. HOLD PRIOR TO SURG     • nitrofurantoin, macrocrystal-monohydrate, (MACROBID) 100 MG capsule Take 1 capsule by mouth 2 (Two) Times a Day. 6 capsule 0   • Omega-3 Fatty Acids (FISH  "OIL) 1000 MG capsule capsule Take 1,000 mg by mouth Daily With Breakfast. HOLD PRIOR TO SURG     • oxyCODONE-acetaminophen (PERCOCET) 5-325 MG per tablet Take 1-2 tablets by Mouth every four to six hours as needed for pain. Not to exceed 6 in a 24 hour period. 20 tablet 0   • promethazine (PHENERGAN) 25 MG tablet Take 0.5 to 1 tablets by mouth every 4 hours  as needed for nausea 20 tablet 0     No current facility-administered medications for this visit.         The following portions of the patient's history were reviewed and updated as appropriate: allergies, current medications, past family history, past medical history, past social history, past surgical history and problem list.    Review of Systems:   A 14 point review of systems was performed. All systems negative except pertinent positives/negative listed in HPI above    Physical Exam:   Vitals:    02/11/22 1546   Temp: 98.7 °F (37.1 °C)   TempSrc: Temporal   Weight: 63.5 kg (140 lb)   Height: 167.6 cm (65.98\")       General: A and O x 3, ASA, NAD    SCLERA:    Normal    Skin clear no unusual lesions noted  Bilateral knees patient has no appreciable effusion 122 degrees flexion neutral in extension with significant patellofemoral crepitus noted.  Negative Lockman negative Thao calf soft and nontender       Radiology:  Xrays 3views (ap,lateral, sunrise) bilateral knees were ordered and reviewed today secondary to pain show significant patellofemoral arthritic changes noted bilaterally.  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

## 2022-05-10 ENCOUNTER — OFFICE VISIT (OUTPATIENT)
Dept: CARDIOLOGY | Facility: CLINIC | Age: 52
End: 2022-05-10

## 2022-05-10 VITALS
DIASTOLIC BLOOD PRESSURE: 88 MMHG | WEIGHT: 141 LBS | BODY MASS INDEX: 22.66 KG/M2 | HEART RATE: 63 BPM | HEIGHT: 66 IN | SYSTOLIC BLOOD PRESSURE: 148 MMHG

## 2022-05-10 DIAGNOSIS — R94.31 ABNORMAL EKG: ICD-10-CM

## 2022-05-10 DIAGNOSIS — I10 ESSENTIAL HYPERTENSION: ICD-10-CM

## 2022-05-10 DIAGNOSIS — E78.5 HYPERLIPIDEMIA LDL GOAL <100: Primary | ICD-10-CM

## 2022-05-10 PROCEDURE — 99204 OFFICE O/P NEW MOD 45 MIN: CPT | Performed by: INTERNAL MEDICINE

## 2022-05-10 PROCEDURE — 93000 ELECTROCARDIOGRAM COMPLETE: CPT | Performed by: INTERNAL MEDICINE

## 2022-05-10 NOTE — PROGRESS NOTES
Subjective:     Encounter Date:05/10/2022      Patient ID: Agnes Hayes is a 52 y.o. female.    Chief Complaint: Cardiovascular risk assessment  HPI:   This is a 52-year-old woman who presents for evaluation.  She has a strong family history of coronary disease.  Her mother had a stroke at the age of 69.  Her father had coronary artery bypass graft surgery at the age of 60.  She herself has a history of hypertension and hyperlipidemia.  Overall she feels well.  She has quite active in terms of exercise and has no exertional symptoms.  She eats mostly plant-based diet for breakfast and lunch and occasionally has chicken for dinner.  Her LDL has been mildly elevated in the 120s range for several years.  She had an echocardiogram performed at her primary care office which showed some anterior Q waves.  This is consistent with prior EKG from .  She has never had angina or chest pain.  She has never had any cardiac evaluation.  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  She does not drink alcohol.    The following portions of the patient's history were reviewed and updated as appropriate: allergies, current medications, past family history, past medical history, past social history, past surgical history and problem list.     REVIEW OF SYSTEMS:   All systems reviewed.  Pertinent positives identified in HPI.  All other systems are negative.    Past Medical History:   Diagnosis Date   • Abnormal mammogram 2018   • Bilateral chronic knee pain    • Breast asymmetry    • Constipation    • Elevated glucose 2019   • Frequent UTI    • Gestational diabetes mellitus     WITH BOTH PREGNANCIES   • Hemorrhoids    • Hyperlipidemia    • Hypertension    • MITCH (stress urinary incontinence, female)        Family History   Problem Relation Age of Onset   • Heart attack Mother          age 69   • Stroke Mother    • Hypertension Mother    • Diabetes Mother    • Heart attack Father           age 71   • Hypertension Father    • Stroke Father    • Schizophrenia Brother    • Mental illness Brother    • Breast cancer Neg Hx    • Ovarian cancer Neg Hx    • Uterine cancer Neg Hx    • Colon cancer Neg Hx    • Malig Hyperthermia Neg Hx        Social History     Socioeconomic History   • Marital status:    Tobacco Use   • Smoking status: Never Smoker   • Smokeless tobacco: Never Used   Substance and Sexual Activity   • Alcohol use: No   • Drug use: No   • Sexual activity: Yes     Partners: Male     Birth control/protection: Surgical     Comment: tubal. .       Allergies   Allergen Reactions   • Bactrim [Sulfamethoxazole-Trimethoprim] Diarrhea     patient states she does not want to ever take Bactrim again       Past Surgical History:   Procedure Laterality Date   • BUNIONECTOMY Right 2008   • BUNIONECTOMY Left 2010    DR. BONITA RICHARDSON   • CHOLECYSTECTOMY N/A 2003    DR. TONI GARCIA   • CYSTOSCOPY N/A 08/2017    DR. STANLEY MILLER   • ENDOMETRIAL ABLATION N/A 2007   • ENDOSCOPY N/A 09/08/2009    CHRONIC ACTIVE GASTRITIS, DR. GLORY PACHECO AT [facility]   • PUBOVAGINAL SLING N/A 11/5/2021    Procedure: PUBO VAGINAL SLING INSERTION OF VAGINAL GRAFT CYSTORETHROSCOPY, AND REMOVAL OF PERIANAL SKIN TAGS;  Surgeon: Tiffanie Hebert MD;  Location: [facility];  Service: Gynecology;  Laterality: N/A;   • TUBAL ABDOMINAL LIGATION Bilateral 2002   • VAGINAL DELIVERY N/A 04/16/2002    DR. KOSTA KATZ AT [place]   • VAGINAL DELIVERY N/A 06/1999         ECG 12 Lead    Date/Time: 5/10/2022 4:43 PM  Performed by: Elsa Moreno MD  Authorized by: Elsa Moreno MD   Comparison: compared with previous ECG from 11/4/2021  Similar to previous ECG  Rhythm: sinus rhythm  Rate: normal  Conduction: conduction normal  Q waves: V1 and V2    ST Segments: ST segments normal  T Waves: T waves normal  QRS axis: normal  Other: no other findings    Clinical impression: non-specific ECG               Objective:         PHYSICAL  EXAM:  GEN: VSS, no distress,   Eyes: normal sclera, normal lids and lashes  HENT: moist mucus membranes,   Respiratory: CTAB, no rales or wheezes  CV: RRR, no murmurs, , +2 DP and 2+ carotid pulses b/l  GI: NABS, soft,  Nontender, nondistended  MSK: no edema, no scoliosis or kyphosis  Skin: no rash, warm, dry  Heme/Lymph: no bruising or bleeding  Psych: organized thought, normal behavior and affect  Neuro: Cranial nerves grossly intact, Alert and Oriented x 3.         Assessment:          Diagnosis Plan   1. Hyperlipidemia LDL goal <100  CT Cardiac Calcium Score Without Dye   2. Essential hypertension  Adult Transthoracic Echo Complete w/ Color, Spectral and Contrast if Necessary Per Protocol   3. Abnormal EKG  Adult Transthoracic Echo Complete w/ Color, Spectral and Contrast if Necessary Per Protocol          Plan:       1.  Hyperlipidemia, .  She has pretty much already instituted lifestyle modifications for her hyperlipidemia.  Both of her parents have had cardiovascular disease.  We will get a coronary calcium score to assess her risk.  2.  Abnormal EKG: Anterior Q waves.  No dyspnea or chest pain.  We will get an echocardiogram for further evaluation.    Dr. Zamudio thank you very much for referring this kind patient to me. Please call me with any questions or concerns. I will see the patient again in the office in 12 months.         Elsa Moreno MD  05/10/22  [facility]    Outpatient Encounter Medications as of 5/10/2022   Medication Sig Dispense Refill   • aspirin 81 MG EC tablet Take 81 mg by mouth Daily. INSTRUCTED PT TO FOLLOW MD INSTRUCTIONS REGARDING HOLDING FOR SURGERY     • B Complex-C-E-Zn (B COMPLEX-C-E-ZINC) tablet Take 1 tablet by mouth Daily. HOLD PRIOR TO SURG     • cephalexin (KEFLEX) 500 MG capsule Take 1 capsule by mouth As Needed (after intercourse). 30 capsule 3   • cetirizine (ZyrTEC) 10 MG tablet Take 10 mg by mouth daily.     • cholecalciferol (VITAMIN D3) 1000  UNITS tablet Take 1,000 Units by mouth daily.     • losartan (COZAAR) 50 MG tablet Take 1 tablet by mouth Daily. 90 tablet 3   • multivitamin (DAILY IRENE) tablet tablet Take 1 tablet by mouth Daily. HOLD PRIOR TO SURG     • Omega-3 Fatty Acids (FISH OIL) 1000 MG capsule capsule Take 1,000 mg by mouth Daily With Breakfast. HOLD PRIOR TO SURG     • [DISCONTINUED] docusate sodium (COLACE) 100 MG capsule Take 1 capsule by mouth 2 (Two) Times a Day. 60 capsule 3   • [DISCONTINUED] ibuprofen (ADVIL,MOTRIN) 400 MG tablet Take one tablet by mouth every four hours as needed for pain. 40 tablet 0   • [DISCONTINUED] nitrofurantoin, macrocrystal-monohydrate, (MACROBID) 100 MG capsule Take 1 capsule by mouth 2 (Two) Times a Day. 6 capsule 0   • [DISCONTINUED] oxyCODONE-acetaminophen (PERCOCET) 5-325 MG per tablet Take 1-2 tablets by Mouth every four to six hours as needed for pain. Not to exceed 6 in a 24 hour period. 20 tablet 0   • [DISCONTINUED] promethazine (PHENERGAN) 25 MG tablet Take 0.5 to 1 tablets by mouth every 4 hours  as needed for nausea 20 tablet 0     No facility-administered encounter medications on file as of 5/10/2022.

## 2022-05-27 ENCOUNTER — HOSPITAL ENCOUNTER (OUTPATIENT)
Dept: CARDIOLOGY | Facility: HOSPITAL | Age: 52
Discharge: HOME | End: 2022-05-27
Admitting: INTERNAL MEDICINE

## 2022-05-27 VITALS
HEIGHT: 65 IN | WEIGHT: 141 LBS | BODY MASS INDEX: 23.49 KG/M2 | HEART RATE: 63 BPM | SYSTOLIC BLOOD PRESSURE: 134 MMHG | DIASTOLIC BLOOD PRESSURE: 95 MMHG

## 2022-05-27 DIAGNOSIS — R94.31 ABNORMAL EKG: ICD-10-CM

## 2022-05-27 DIAGNOSIS — I10 ESSENTIAL HYPERTENSION: ICD-10-CM

## 2022-05-27 LAB
AORTIC ARCH: 2.3 CM
ASCENDING AORTA: 2.3 CM
BH CV ECHO MEAS - ACS: 1.93 CM
BH CV ECHO MEAS - AO MAX PG: 5.8 MMHG
BH CV ECHO MEAS - AO MEAN PG: 2.9 MMHG
BH CV ECHO MEAS - AO ROOT DIAM: 2.7 CM
BH CV ECHO MEAS - AO V2 MAX: 120.1 CM/SEC
BH CV ECHO MEAS - AO V2 VTI: 24.1 CM
BH CV ECHO MEAS - AVA(I,D): 2.37 CM2
BH CV ECHO MEAS - EDV(CUBED): 70.9 ML
BH CV ECHO MEAS - EDV(MOD-SP2): 59 ML
BH CV ECHO MEAS - EDV(MOD-SP4): 61 ML
BH CV ECHO MEAS - EF(MOD-BP): 64.7 %
BH CV ECHO MEAS - EF(MOD-SP2): 64.4 %
BH CV ECHO MEAS - EF(MOD-SP4): 65.6 %
BH CV ECHO MEAS - ESV(CUBED): 20.8 ML
BH CV ECHO MEAS - ESV(MOD-SP2): 21 ML
BH CV ECHO MEAS - ESV(MOD-SP4): 21 ML
BH CV ECHO MEAS - FS: 33.5 %
BH CV ECHO MEAS - IVS/LVPW: 1.01 CM
BH CV ECHO MEAS - IVSD: 0.79 CM
BH CV ECHO MEAS - LAT PEAK E' VEL: 12.3 CM/SEC
BH CV ECHO MEAS - LV DIASTOLIC VOL/BSA (35-75): 35.5 CM2
BH CV ECHO MEAS - LV MASS(C)D: 95.7 GRAMS
BH CV ECHO MEAS - LV MAX PG: 3.5 MMHG
BH CV ECHO MEAS - LV MEAN PG: 1.73 MMHG
BH CV ECHO MEAS - LV SYSTOLIC VOL/BSA (12-30): 12.2 CM2
BH CV ECHO MEAS - LV V1 MAX: 93.4 CM/SEC
BH CV ECHO MEAS - LV V1 VTI: 19.1 CM
BH CV ECHO MEAS - LVIDD: 4.1 CM
BH CV ECHO MEAS - LVIDS: 2.8 CM
BH CV ECHO MEAS - LVOT AREA: 3 CM2
BH CV ECHO MEAS - LVOT DIAM: 1.95 CM
BH CV ECHO MEAS - LVPWD: 0.78 CM
BH CV ECHO MEAS - MED PEAK E' VEL: 9.9 CM/SEC
BH CV ECHO MEAS - MV A DUR: 0.12 SEC
BH CV ECHO MEAS - MV A MAX VEL: 65.1 CM/SEC
BH CV ECHO MEAS - MV DEC SLOPE: 496.4 CM/SEC2
BH CV ECHO MEAS - MV DEC TIME: 0.23 MSEC
BH CV ECHO MEAS - MV E MAX VEL: 84.8 CM/SEC
BH CV ECHO MEAS - MV E/A: 1.3
BH CV ECHO MEAS - MV MAX PG: 4.3 MMHG
BH CV ECHO MEAS - MV MEAN PG: 1.62 MMHG
BH CV ECHO MEAS - MV P1/2T: 55.7 MSEC
BH CV ECHO MEAS - MV V2 VTI: 26.3 CM
BH CV ECHO MEAS - MVA(P1/2T): 4 CM2
BH CV ECHO MEAS - MVA(VTI): 2.17 CM2
BH CV ECHO MEAS - PA ACC TIME: 0.15 SEC
BH CV ECHO MEAS - PA PR(ACCEL): 10.1 MMHG
BH CV ECHO MEAS - PA V2 MAX: 137.1 CM/SEC
BH CV ECHO MEAS - PULM A REVS DUR: 0.12 SEC
BH CV ECHO MEAS - PULM A REVS VEL: 39 CM/SEC
BH CV ECHO MEAS - PULM DIAS VEL: 43.7 CM/SEC
BH CV ECHO MEAS - PULM S/D: 1.3
BH CV ECHO MEAS - PULM SYS VEL: 57 CM/SEC
BH CV ECHO MEAS - SI(MOD-SP2): 22.1 ML/M2
BH CV ECHO MEAS - SI(MOD-SP4): 23.3 ML/M2
BH CV ECHO MEAS - SUP REN AO DIAM: 1.6 CM
BH CV ECHO MEAS - SV(LVOT): 57.1 ML
BH CV ECHO MEAS - SV(MOD-SP2): 38 ML
BH CV ECHO MEAS - SV(MOD-SP4): 40 ML
BH CV ECHO MEAS - TAPSE (>1.6): 2.11 CM
BH CV ECHO MEASUREMENTS AVERAGE E/E' RATIO: 7.64
BH CV XLRA - RV BASE: 2.44 CM
BH CV XLRA - RV LENGTH: 5 CM
BH CV XLRA - RV MID: 2.1 CM
BH CV XLRA - TDI S': 13.7 CM/SEC
LEFT ATRIUM VOLUME INDEX: 13.7 ML/M2
LV EF 2D ECHO EST: 65 %
MAXIMAL PREDICTED HEART RATE: 170 BPM
SINUS: 2.7 CM
STJ: 2.7 CM
STRESS TARGET HR: 145 BPM

## 2022-05-27 PROCEDURE — 93306 TTE W/DOPPLER COMPLETE: CPT

## 2022-05-27 PROCEDURE — 93306 TTE W/DOPPLER COMPLETE: CPT | Performed by: INTERNAL MEDICINE

## 2022-06-16 ENCOUNTER — HOSPITAL ENCOUNTER (OUTPATIENT)
Dept: CT IMAGING | Facility: HOSPITAL | Age: 52
Discharge: HOME | End: 2022-06-16
Admitting: INTERNAL MEDICINE

## 2022-06-16 DIAGNOSIS — E78.5 HYPERLIPIDEMIA LDL GOAL <100: ICD-10-CM

## 2022-06-16 PROCEDURE — 75571 CT HRT W/O DYE W/CA TEST: CPT

## 2022-06-16 NOTE — PROGRESS NOTES
Please call patient with their normal test results. Everything looks great. No calcium or cholesterol plaque build up at all

## 2022-08-24 ENCOUNTER — OFFICE VISIT (OUTPATIENT)
Dept: OBSTETRICS AND GYNECOLOGY | Age: 52
End: 2022-08-24

## 2022-08-24 VITALS
HEIGHT: 65 IN | SYSTOLIC BLOOD PRESSURE: 132 MMHG | DIASTOLIC BLOOD PRESSURE: 84 MMHG | WEIGHT: 136.6 LBS | BODY MASS INDEX: 22.76 KG/M2

## 2022-08-24 DIAGNOSIS — Z01.419 ENCOUNTER FOR GYNECOLOGICAL EXAMINATION WITHOUT ABNORMAL FINDING: ICD-10-CM

## 2022-08-24 DIAGNOSIS — Z12.11 SCREENING FOR COLON CANCER: Primary | ICD-10-CM

## 2022-08-24 DIAGNOSIS — N95.1 PERIMENOPAUSE: ICD-10-CM

## 2022-08-24 DIAGNOSIS — Z12.31 ENCOUNTER FOR SCREENING MAMMOGRAM FOR MALIGNANT NEOPLASM OF BREAST: ICD-10-CM

## 2022-08-24 PROCEDURE — 99396 PREV VISIT EST AGE 40-64: CPT | Performed by: OBSTETRICS & GYNECOLOGY

## 2022-08-24 NOTE — PROGRESS NOTES
Routine Annual Visit    2022    Patient: Agnes Hayes          MR#:[id number]      Chief Complaint   Patient presents with   • Gynecologic Exam     AE Today, Last AE 2021, Last pap 2020 (-) HPV (-), MG 2021       History of Present Illness    52 y.o. female  who presents for annual exam.   She had the MUS and it has not worked well for her. Still having some leakage and has to wear a pad every day.  She also had to straight cath for nearly a month after the surgery.  She did not have the outcome that she was expecting.  She has been having menses still. Not heavy or painful. No hot flashes, night sweats  Feeling like she has low desire after having the mid urethral sling.  I recommended a book to her about menopausal changes and female sexual health.    Health Maintenance  Last pap:   Mammogram:   Colonoscopy none yet  Family history of Breast, ovarian, uterine, colon, pancreatic cancer: no    Patient's last menstrual period was 08/15/2022 (exact date).  Obstetric History:  OB History        2    Para   2    Term   2       0    AB   0    Living   2       SAB        IAB        Ectopic        Molar        Multiple        Live Births                   Menstrual History:     Patient's last menstrual period was 08/15/2022 (exact date).       ________________________________________  Patient Active Problem List   Diagnosis   • Hypertension   • Hyperlipidemia   • Slow transit constipation   • Family history of heart disease   • Recurrent UTI   • Incontinence in female   • Hemorrhoids   • Family history of diabetes mellitus   • Screening for colorectal cancer   • Female stress incontinence       Past Medical History:   Diagnosis Date   • Abnormal mammogram 2018   • Bilateral chronic knee pain    • Breast asymmetry    • Constipation    • Elevated glucose 2019   • Frequent UTI    • Gestational diabetes mellitus     WITH BOTH PREGNANCIES   • Hemorrhoids    • Hyperlipidemia     • Hypertension    • MITCH (stress urinary incontinence, female)        Family History   Problem Relation Age of Onset   • Heart attack Mother          age 69   • Stroke Mother    • Hypertension Mother    • Diabetes Mother    • Heart attack Father          age 71   • Hypertension Father    • Stroke Father    • Schizophrenia Brother    • Mental illness Brother    • Breast cancer Neg Hx    • Ovarian cancer Neg Hx    • Uterine cancer Neg Hx    • Colon cancer Neg Hx    • Malig Hyperthermia Neg Hx        Past Surgical History:   Procedure Laterality Date   • BUNIONECTOMY Right    • BUNIONECTOMY Left     DR. BONITA RICHARDSON   • CHOLECYSTECTOMY N/A     DR. TONI GARCIA   • CYSTOSCOPY N/A 2017    DR. STANLEY MILLER   • ENDOMETRIAL ABLATION N/A    • ENDOSCOPY N/A 2009    CHRONIC ACTIVE GASTRITIS, DR. GLORY PACHECO AT [facility]   • PUBOVAGINAL SLING N/A 2021    Procedure: PUBO VAGINAL SLING INSERTION OF VAGINAL GRAFT CYSTORETHROSCOPY, AND REMOVAL OF PERIANAL SKIN TAGS;  Surgeon: Tiffanie Hebert MD;  Location: [facility];  Service: Gynecology;  Laterality: N/A;   • TUBAL ABDOMINAL LIGATION Bilateral    • VAGINAL DELIVERY N/A 2002    DR. KOSTA KATZ AT [place]   • VAGINAL DELIVERY N/A 1999       Social History     Tobacco Use   Smoking Status Never Smoker   Smokeless Tobacco Never Used       has a current medication list which includes the following prescription(s): aspirin, b complex-c-e-zinc, cephalexin, cetirizine, cholecalciferol, losartan, multivitamin, and fish oil.  ________________________________________      Review of Systems   Constitutional: Negative for fever and unexpected weight change.   Respiratory: Negative for shortness of breath.    Cardiovascular: Negative for chest pain.   Gastrointestinal: Negative for abdominal pain, constipation and diarrhea.   Genitourinary: Negative for frequency and urgency.   Hematological: Negative for adenopathy.  "  Psychiatric/Behavioral: Negative for dysphoric mood.       Objective   Physical Exam    /84   Ht 165.1 cm (65\")   Wt 62 kg (136 lb 9.6 oz)   LMP 08/15/2022 (Exact Date)   Breastfeeding No   BMI 22.73 kg/m²    BP Readings from Last 3 Encounters:   08/24/22 132/84   05/27/22 134/95   05/10/22 148/88      Wt Readings from Last 3 Encounters:   08/24/22 62 kg (136 lb 9.6 oz)   05/27/22 64 kg (141 lb)   05/10/22 64 kg (141 lb)         BMI: Body mass index is 22.73 kg/m².       General:   alert, appears stated age and cooperative   Neck: No thyromegaly or LAD   Heart:: regular rate and rhythm, S1, S2 normal, no murmur, click, rub or gallop   Lungs: normal respiratory effort and auscultation   Abdomen: soft, non-tender, without masses or organomegaly   Breast: inspection negative, no nipple discharge or bleeding, no masses or nodularity palpable   Urethra and bladder: urethral meatus normal; bladder nontender to palpation; sling not seen   Vulva: normal, Bartholin's, Urethra, [place]'s normal   Vagina: normal mucosa, normal discharge   Cervix: multiparous appearance and no lesions   Uterus: normal size and anteverted   Adnexa: normal adnexa and no mass, fullness, tenderness       Assessment:    normal annual exam   Stress incontinence  Perimenopause  Low libido    Plan:    Plan     [x]  Mammogram request made  []  PAP done up-to-date  []  Labs:   []  GC/Chl/TV  []  DEXA scan   [x]  Referral for colonoscopy:     I recommended some reading about perimenopause and female sexual function.  Having had the medical issue of the mid urethral sling may have changed her outlook and desire.  Discussed some ways to cope with this  Discussed possibility of irregular, troublesome bleeding heading into menopause and to come in if she is having any issues    Counseling  [x]  Nutrition  [x]  Physical activity/regular exercise   [x]  Healthy weight  []  Injury prevention  []  Smoking cessation  []  Substance misuse/abuse  [x]  " Sexual behavior  []  STD prevention  []  Contraception  []  Dental health  []  Mental health  []  Immunization  [x]  Encouraged SBE        Karen Arredondo MD  08/24/2022  08:33 EDT

## 2022-10-07 ENCOUNTER — OFFICE VISIT (OUTPATIENT)
Dept: FAMILY MEDICINE CLINIC | Facility: CLINIC | Age: 52
End: 2022-10-07

## 2022-10-07 ENCOUNTER — TELEPHONE (OUTPATIENT)
Dept: FAMILY MEDICINE CLINIC | Facility: CLINIC | Age: 52
End: 2022-10-07

## 2022-10-07 VITALS
SYSTOLIC BLOOD PRESSURE: 128 MMHG | BODY MASS INDEX: 21.24 KG/M2 | WEIGHT: 132.2 LBS | HEART RATE: 51 BPM | TEMPERATURE: 97.8 F | DIASTOLIC BLOOD PRESSURE: 78 MMHG | OXYGEN SATURATION: 97 % | HEIGHT: 66 IN

## 2022-10-07 DIAGNOSIS — E78.49 OTHER HYPERLIPIDEMIA: ICD-10-CM

## 2022-10-07 DIAGNOSIS — Z00.00 HEALTHCARE MAINTENANCE: Primary | ICD-10-CM

## 2022-10-07 DIAGNOSIS — E55.9 VITAMIN D DEFICIENCY: ICD-10-CM

## 2022-10-07 DIAGNOSIS — I10 HYPERTENSION, BENIGN: ICD-10-CM

## 2022-10-07 PROCEDURE — 99396 PREV VISIT EST AGE 40-64: CPT | Performed by: FAMILY MEDICINE

## 2022-10-07 RX ORDER — LOSARTAN POTASSIUM 50 MG/1
50 TABLET ORAL DAILY
Qty: 90 TABLET | Refills: 3 | Status: SHIPPED | OUTPATIENT
Start: 2022-10-07

## 2022-10-07 NOTE — PROGRESS NOTES
"Agnes Hayes is here today for an annual physical exam.     Eating a healthy diet. Exercising routinely.   Regular periods. Wears seat belt. Feels safe at home.   Sexual activity: yes  Birth control: btl  Pregnancy:  Sexual and gender orientation: heterosexual female    PHQ-2 Depression Screening  Little interest or pleasure in doing things? 0-->not at all   Feeling down, depressed, or hopeless? 0-->not at all   PHQ-2 Total Score 0         I have reviewed the patient's medical, family, and social history in detail and updated the computerized patient record.    Screening history:  Colonoscopy - has been ordered- scheduled for   Mammogram- scheduled for 22, Pap/pelvic - utd  Metabolic - due    Health Maintenance   Topic Date Due   • COLORECTAL CANCER SCREENING  Never done   • COVID-19 Vaccine (4 - Booster for Pfizer series) 2021   • ZOSTER VACCINE (1 of 2) Never done   • INFLUENZA VACCINE  2022   • LIPID PANEL  10/05/2022   • ANNUAL PHYSICAL  10/06/2022   • MAMMOGRAM  2022   • PAP SMEAR  2023   • TDAP/TD VACCINES (2 - Td or Tdap) 2027   • HEPATITIS C SCREENING  Completed   • Pneumococcal Vaccine 0-64  Aged Out       Review of Systems   Constitutional: Negative for fever.   HENT: Negative for hearing loss.    Eyes: Negative for visual disturbance.   Respiratory: Negative for shortness of breath.    Cardiovascular: Negative for chest pain.   Gastrointestinal: Negative for constipation and diarrhea.   Genitourinary: Negative for difficulty urinating.   Musculoskeletal: Negative for arthralgias and myalgias.   Skin: Negative for rash.   Hematological: Does not bruise/bleed easily.   Psychiatric/Behavioral: Negative for dysphoric mood.       /78 (BP Location: Left arm, Patient Position: Sitting)   Pulse 51   Temp 97.8 °F (36.6 °C)   Ht 167.6 cm (66\")   Wt 60 kg (132 lb 3.2 oz)   SpO2 97%   BMI 21.34 kg/m²      Physical Exam    Vital signs reviewed.  General " appearance: No acute distress  Eyes: conjunctiva clear without erythema; pupils equally round and reactive  ENT: external ears and nose normal; hearing normal, oropharynx clear  Neck: supple; no thyromegaly  CV: normal rate and rhythm; no peripheral edema  Respiratory: normal respiratory effort; lungs clear to auscultation bilaterally  MSK: normal gait and station; no focal joint deformity or swelling  Skin: no rash or wounds; normal turgor  Neuro: cranial nerves 2-12 grossly intact; normal sensation to light touch  Psych: mood and affect normal; recent and remote memory intact    No visits with results within 2 Week(s) from this visit.   Latest known visit with results is:   Hospital Outpatient Visit on 05/27/2022   Component Date Value Ref Range Status   • Target HR (85%) 05/27/2022 145  bpm Final   • Max. Pred. HR (100%) 05/27/2022 170  bpm Final   • RV S' 05/27/2022 13.7  cm/sec Final   • RV Base 05/27/2022 2.44  cm Final   • RV Length 05/27/2022 5.0  cm Final   • RV Mid 05/27/2022 2.10  cm Final   • Sinus 05/27/2022 2.7  cm Final   • STJ 05/27/2022 2.7  cm Final   • Ascending aorta 05/27/2022 2.3  cm Final   • Aortic arch 05/27/2022 2.3  cm Final   • LA ESV Index (BP) 05/27/2022 13.7  ml/m2 Final   • Avg E/e' ratio 05/27/2022 7.64   Final   • ACS 05/27/2022 1.93  cm Final   • Ao root diam 05/27/2022 2.7  cm Final   • Ao pk alexander 05/27/2022 120.1  cm/sec Final   • Ao V2 VTI 05/27/2022 24.1  cm Final   • TERESO(I,D) 05/27/2022 2.37  cm2 Final   • EDV(cubed) 05/27/2022 70.9  ml Final   • EDV(MOD-sp2) 05/27/2022 59.0  ml Final   • EDV(MOD-sp4) 05/27/2022 61.0  ml Final   • EF(MOD-bp) 05/27/2022 64.7  % Final   • EF(MOD-sp2) 05/27/2022 64.4  % Final   • EF(MOD-sp4) 05/27/2022 65.6  % Final   • ESV(cubed) 05/27/2022 20.8  ml Final   • ESV(MOD-sp2) 05/27/2022 21.0  ml Final   • ESV(MOD-sp4) 05/27/2022 21.0  ml Final   • IVS/LVPW 05/27/2022 1.01  cm Final   • Lat Peak E' Alexander 05/27/2022 12.3  cm/sec Final   • LV mass(C)d  05/27/2022 95.7  grams Final   • LV V1 max PG 05/27/2022 3.5  mmHg Final   • LV V1 mean PG 05/27/2022 1.73  mmHg Final   • LV V1 max 05/27/2022 93.4  cm/sec Final   • LVPWd 05/27/2022 0.78  cm Final   • Med Peak E' Alexander 05/27/2022 9.9  cm/sec Final   • MV dec slope 05/27/2022 496.4  cm/sec2 Final   • MV dec time 05/27/2022 0.23  msec Final   • MV P1/2t 05/27/2022 55.7  msec Final   • MV V2 VTI 05/27/2022 26.3  cm Final   • MVA(VTI) 05/27/2022 2.17  cm2 Final   • PA acc time 05/27/2022 0.15  sec Final   • PA pr(Accel) 05/27/2022 10.1  mmHg Final   • PA V2 max 05/27/2022 137.1  cm/sec Final   • Pulm A Revs Alexander 05/27/2022 39.0  cm/sec Final   • SI(MOD-sp2) 05/27/2022 22.1  ml/m2 Final   • SI(MOD-sp4) 05/27/2022 23.3  ml/m2 Final   • Abdo Ao Diam 05/27/2022 1.6  cm Final   • SV(LVOT) 05/27/2022 57.1  ml Final   • SV(MOD-sp2) 05/27/2022 38.0  ml Final   • SV(MOD-sp4) 05/27/2022 40.0  ml Final   • Ao max PG 05/27/2022 5.8  mmHg Final   • Ao mean PG 05/27/2022 2.9  mmHg Final   • FS 05/27/2022 33.5  % Final   • IVSd 05/27/2022 0.79  cm Final   • LV V1 VTI 05/27/2022 19.1  cm Final   • LVIDd 05/27/2022 4.1  cm Final   • LVIDs 05/27/2022 2.8  cm Final   • LVOT area 05/27/2022 3.0  cm2 Final   • LVOT diam 05/27/2022 1.95  cm Final   • MV E/A 05/27/2022 1.30   Final   • MV max PG 05/27/2022 4.3  mmHg Final   • MV mean PG 05/27/2022 1.62  mmHg Final   • MVA(P1/2t) 05/27/2022 4.0  cm2 Final   • Pulm S/D 05/27/2022 1.30   Final   • MV A dur 05/27/2022 0.12  sec Final   • MV A max alexander 05/27/2022 65.1  cm/sec Final   • MV E max alexander 05/27/2022 84.8  cm/sec Final   • Pulm A Revs Dur 05/27/2022 0.12  sec Final   • Pulm Suero Alexander 05/27/2022 43.7  cm/sec Final   • Pulm Sys Alexander 05/27/2022 57.0  cm/sec Final   • LV Suero Vol (BSA corrected) 05/27/2022 35.5  cm2 Final   • LV Sys Vol (BSA corrected) 05/27/2022 12.2  cm2 Final   • TAPSE (>1.6) 05/27/2022 2.11  cm Final   • Echo EF Estimated 05/27/2022 65  % Final         Current Outpatient  Medications:   •  aspirin 81 MG EC tablet, Take 81 mg by mouth Daily. INSTRUCTED PT TO FOLLOW MD INSTRUCTIONS REGARDING HOLDING FOR SURGERY, Disp: , Rfl:   •  B Complex-C-E-Zn (B COMPLEX-C-E-ZINC) tablet, Take 1 tablet by mouth Daily. HOLD PRIOR TO SURG, Disp: , Rfl:   •  Calcium Polycarbophil (FIBER-CAPS PO), Take  by mouth., Disp: , Rfl:   •  cetirizine (ZyrTEC) 10 MG tablet, Take 10 mg by mouth daily., Disp: , Rfl:   •  cholecalciferol (VITAMIN D3) 1000 UNITS tablet, Take 1,000 Units by mouth daily., Disp: , Rfl:   •  losartan (COZAAR) 50 MG tablet, Take 1 tablet by mouth Daily., Disp: 90 tablet, Rfl: 3  •  multivitamin (DAILY IRENE) tablet tablet, Take 1 tablet by mouth Daily. HOLD PRIOR TO SURG, Disp: , Rfl:   •  urea (Urea 20 Intensive Hydrating) 20 % cream, Apply two times per day to the rough skin areas, Disp: 85 g, Rfl: 5    Diagnoses and all orders for this visit:    1. Healthcare maintenance (Primary)  -     Comprehensive Metabolic Panel  -     Lipid Panel    2. Hypertension, benign  -     losartan (COZAAR) 50 MG tablet; Take 1 tablet by mouth Daily.  Dispense: 90 tablet; Refill: 3    3. Other hyperlipidemia  -     Comprehensive Metabolic Panel  -     Lipid Panel    4. Vitamin D deficiency  -     Vitamin D 25 Hydroxy        Encourage healthy diet and exercise.  Encourage patient to stay up to date on screening examinations as indicated based on age and risk factors. F/U yearly.     Declines shots.

## 2022-10-07 NOTE — TELEPHONE ENCOUNTER
Caller: Agnes Hayes    Relationship: Self    Best call back number: [phone] (H)    What is the best time to reach you: ANY     Who are you requesting to speak with (clinical staff, provider,  specific staff member): DACIA/MA     What was the call regarding: PATIENT STATES THAT THE CEFELEXIN WAS PRESCRIBED BY HER OBGYN TO PREVENT UTI'S AND IT HAS BEEN REMOVED FROM HER MED LIST ON MY CHART. STATES SHE THINKS DR. PEDERSON MADE A MISTAKE IN REMOVING IT.     Do you require a callback: YES

## 2022-10-08 LAB
25(OH)D3+25(OH)D2 SERPL-MCNC: 43 NG/ML (ref 30–100)
ALBUMIN SERPL-MCNC: 4.8 G/DL (ref 3.5–5.2)
ALBUMIN/GLOB SERPL: 2.3 G/DL
ALP SERPL-CCNC: 88 U/L (ref 39–117)
ALT SERPL-CCNC: 11 U/L (ref 1–33)
AST SERPL-CCNC: 19 U/L (ref 1–32)
BILIRUB SERPL-MCNC: 0.7 MG/DL (ref 0–1.2)
BUN SERPL-MCNC: 10 MG/DL (ref 6–20)
BUN/CREAT SERPL: 14.3 (ref 7–25)
CALCIUM SERPL-MCNC: 9.5 MG/DL (ref 8.6–10.5)
CHLORIDE SERPL-SCNC: 102 MMOL/L (ref 98–107)
CHOLEST SERPL-MCNC: 229 MG/DL (ref 0–200)
CO2 SERPL-SCNC: 32.6 MMOL/L (ref 22–29)
CREAT SERPL-MCNC: 0.7 MG/DL (ref 0.57–1)
EGFRCR SERPLBLD CKD-EPI 2021: 105.5 ML/MIN/1.73
GLOBULIN SER CALC-MCNC: 2.1 GM/DL
GLUCOSE SERPL-MCNC: 94 MG/DL (ref 65–99)
HDLC SERPL-MCNC: 77 MG/DL (ref 40–60)
LDLC SERPL CALC-MCNC: 141 MG/DL (ref 0–100)
POTASSIUM SERPL-SCNC: 4.9 MMOL/L (ref 3.5–5.2)
PROT SERPL-MCNC: 6.9 G/DL (ref 6–8.5)
SODIUM SERPL-SCNC: 143 MMOL/L (ref 136–145)
TRIGL SERPL-MCNC: 67 MG/DL (ref 0–150)
VLDLC SERPL CALC-MCNC: 11 MG/DL (ref 5–40)

## 2022-10-10 ENCOUNTER — TELEPHONE (OUTPATIENT)
Dept: FAMILY MEDICINE CLINIC | Facility: CLINIC | Age: 52
End: 2022-10-10

## 2022-10-10 DIAGNOSIS — E78.49 OTHER HYPERLIPIDEMIA: Primary | ICD-10-CM

## 2022-10-10 RX ORDER — CEPHALEXIN 500 MG/1
500 CAPSULE ORAL 2 TIMES DAILY
Qty: 30 CAPSULE | Refills: 3 | Status: SHIPPED | OUTPATIENT
Start: 2022-10-10 | End: 2023-01-09 | Stop reason: SDUPTHER

## 2022-10-10 RX ORDER — PRAVASTATIN SODIUM 20 MG
20 TABLET ORAL DAILY
Qty: 90 TABLET | Refills: 3 | Status: SHIPPED | OUTPATIENT
Start: 2022-10-10

## 2022-10-10 NOTE — TELEPHONE ENCOUNTER
Please call the patient and let her know that her cholesterol is high.  At this point I would advise medication.  If she is angry agreement please let me know and I will call in a cholesterol medicine into her pharmacy.

## 2022-10-14 ENCOUNTER — TELEPHONE (OUTPATIENT)
Dept: SURGERY | Facility: CLINIC | Age: 52
End: 2022-10-14

## 2022-10-14 NOTE — TELEPHONE ENCOUNTER
Provider: DR ACEVEDO  Caller: TEJA GODINEZ  Relationship to Patient: SELF    Phone Number: [phone]    Reason for Call: PT CALLED TO REQUEST THE INSTRUCTIONS FOR THE COLONOSCOPY PREP TO BE MAILED TO HER AT [address] [place] [address]. PT ADVISED SHE IS SCHEDULED FOR A COLONOSCOPY ON 1/4/2023.    IF NEEDED, PT CAN BE REACHED ANYTIME; OKAY TO LEAVE MESSAGE IF NO ANSWER

## 2022-10-17 ENCOUNTER — TELEPHONE (OUTPATIENT)
Dept: SURGERY | Facility: CLINIC | Age: 52
End: 2022-10-17

## 2022-10-17 NOTE — TELEPHONE ENCOUNTER
Caller: Agnes Hayes    Relationship: Self    Best call back number: [phone]  What is the best time to reach you:     Who are you requesting to speak with (clinical staff, provider,  specific staff member): DR ACEVEDO AND OR SURGERY SCHEDULER    Do you know the name of the person who called: IRIS    What was the call regarding: PT WANTED TO SEE IF SHE CAN HAVE THE COLON SCREEN DONE ON A Monday FOR 2023    Do you require a callback: YES, CAN LEAVE A MESSAGE

## 2022-11-17 ENCOUNTER — PREP FOR SURGERY (OUTPATIENT)
Dept: OTHER | Facility: HOSPITAL | Age: 52
End: 2022-11-17

## 2022-11-17 DIAGNOSIS — Z12.11 ENCOUNTER FOR SCREENING COLONOSCOPY: Primary | ICD-10-CM

## 2022-12-12 ENCOUNTER — OFFICE VISIT (OUTPATIENT)
Dept: OBSTETRICS AND GYNECOLOGY | Age: 52
End: 2022-12-12

## 2022-12-12 VITALS
HEIGHT: 66 IN | WEIGHT: 138.8 LBS | BODY MASS INDEX: 22.31 KG/M2 | DIASTOLIC BLOOD PRESSURE: 80 MMHG | SYSTOLIC BLOOD PRESSURE: 132 MMHG

## 2022-12-12 DIAGNOSIS — R68.82 LIBIDO, DECREASED: Primary | ICD-10-CM

## 2022-12-12 PROCEDURE — 99213 OFFICE O/P EST LOW 20 MIN: CPT | Performed by: OBSTETRICS & GYNECOLOGY

## 2022-12-12 NOTE — PROGRESS NOTES
"Subjective     Chief Complaint   Patient presents with   • Gynecologic Exam     Follow up decreased libido        Agnes Hayes is a 52 y.o.  whose LMP is No LMP recorded. (Menstrual status: Tubal ligation). presents to follow up regarding change in libido. She notes that she just does not have spontaneous libido anymore, but if she makes plans then she is interested and has fulfilling encounters. She notes regular menses but shorter intermenstrual interval    No Additional Complaints Reported    The following portions of the patient's history were reviewed and updated as appropriate:vital signs, allergies, current medications, past medical history, past social history, past surgical history and problem list    Objective      /80   Ht 167.6 cm (66\")   Wt 63 kg (138 lb 12.8 oz)   BMI 22.40 kg/m²     Physical Exam   Well, no distress      Assessment & Plan     Low libido  Perimenopause    She has made some lifestyle changes to help with this. Discussed starting some testosterone however can affect lipid profile and she just started statin  Discussed some changes associated with perimenopause, I think she is transitioning from early to later perimenopause  Follow up for annual unless if needed sooner      Karen Arredondo MD  2022             "

## 2022-12-16 ENCOUNTER — HOSPITAL ENCOUNTER (OUTPATIENT)
Dept: MAMMOGRAPHY | Facility: HOSPITAL | Age: 52
Discharge: HOME | End: 2022-12-16
Admitting: FAMILY MEDICINE

## 2022-12-16 DIAGNOSIS — Z12.31 VISIT FOR SCREENING MAMMOGRAM: ICD-10-CM

## 2022-12-16 PROCEDURE — 77067 SCR MAMMO BI INCL CAD: CPT

## 2022-12-16 PROCEDURE — 77063 BREAST TOMOSYNTHESIS BI: CPT

## 2023-01-04 ENCOUNTER — TRANSCRIBE ORDERS (OUTPATIENT)
Dept: ADMINISTRATIVE | Facility: HOSPITAL | Age: 53
End: 2023-01-04
Payer: COMMERCIAL

## 2023-01-04 DIAGNOSIS — Z12.31 SCREENING MAMMOGRAM FOR HIGH-RISK PATIENT: Primary | ICD-10-CM

## 2023-01-09 NOTE — TELEPHONE ENCOUNTER
Rx Refill Note  Requested Prescriptions     Pending Prescriptions Disp Refills   • cephalexin (Keflex) 500 MG capsule 30 capsule 3     Sig: Take 1 capsule by mouth 2 (Two) Times a Day. Take Immediately prior to or after intercourse for up to 10 days.      Last office visit with prescribing clinician: 10/7/2022   Last telemedicine visit with prescribing clinician: Visit date not found   Next office visit with prescribing clinician: 10/13/2023  Last refill 10/10/2022

## 2023-01-10 ENCOUNTER — PATIENT MESSAGE (OUTPATIENT)
Dept: OBSTETRICS AND GYNECOLOGY | Age: 53
End: 2023-01-10
Payer: COMMERCIAL

## 2023-01-10 RX ORDER — CEPHALEXIN 500 MG/1
CAPSULE ORAL
Qty: 30 CAPSULE | Refills: 3 | Status: SHIPPED | OUTPATIENT
Start: 2023-01-10

## 2023-01-10 NOTE — TELEPHONE ENCOUNTER
From: Agnes Hayes  To: Karen Arredondo  Sent: 1/10/2023 11:42 AM EST  Subject: Cephalexin     I'm running low on my prescription and noticed the prescribing doctor has been changed to Dr. Zamudio. I think this is a mistake. Can this be fixed and refilled?

## 2023-03-13 ENCOUNTER — TELEPHONE (OUTPATIENT)
Dept: SURGERY | Facility: CLINIC | Age: 53
End: 2023-03-13
Payer: COMMERCIAL

## 2023-03-13 NOTE — TELEPHONE ENCOUNTER
Message sent via [employer].    ----- Message from Agnes Hayes sent at 3/13/2023  7:55 AM EDT -----  Regarding: Colonoscopy   Contact: [phone]  Hi again. I never got my packet of info in the mail but I stihave the one you all sent me from January. I assume it's the same instructions. It says no nuts but is creamy peanut butter okay? Also what time do I show up on March 20? Thanks.

## 2023-03-17 ENCOUNTER — TELEPHONE (OUTPATIENT)
Dept: SURGERY | Facility: CLINIC | Age: 53
End: 2023-03-17
Payer: COMMERCIAL

## 2023-03-17 NOTE — TELEPHONE ENCOUNTER
CALLED PT LVM TO REMIND OF CY SCHEDULED FOR Monday 03/20 WITH ARRIVAL TIME OF 8AM. ADVISED IF ANY CHANGES NEED TO BE MADE TO CALL OUR OFFICE.

## 2023-03-20 ENCOUNTER — ANESTHESIA (OUTPATIENT)
Dept: GASTROENTEROLOGY | Facility: HOSPITAL | Age: 53
End: 2023-03-20
Payer: COMMERCIAL

## 2023-03-20 ENCOUNTER — ANESTHESIA EVENT (OUTPATIENT)
Dept: GASTROENTEROLOGY | Facility: HOSPITAL | Age: 53
End: 2023-03-20
Payer: COMMERCIAL

## 2023-03-20 ENCOUNTER — HOSPITAL ENCOUNTER (OUTPATIENT)
Facility: HOSPITAL | Age: 53
Setting detail: HOSPITAL OUTPATIENT SURGERY
Discharge: HOME | End: 2023-03-20
Attending: COLON & RECTAL SURGERY | Admitting: COLON & RECTAL SURGERY
Payer: COMMERCIAL

## 2023-03-20 VITALS
HEIGHT: 66 IN | HEART RATE: 60 BPM | OXYGEN SATURATION: 100 % | TEMPERATURE: 97.7 F | DIASTOLIC BLOOD PRESSURE: 78 MMHG | BODY MASS INDEX: 22.02 KG/M2 | SYSTOLIC BLOOD PRESSURE: 129 MMHG | RESPIRATION RATE: 16 BRPM | WEIGHT: 137 LBS

## 2023-03-20 DIAGNOSIS — Z12.11 SCREENING FOR COLORECTAL CANCER: ICD-10-CM

## 2023-03-20 DIAGNOSIS — Z12.12 SCREENING FOR COLORECTAL CANCER: ICD-10-CM

## 2023-03-20 LAB
B-HCG UR QL: NEGATIVE
EXPIRATION DATE: NORMAL
INTERNAL NEGATIVE CONTROL: NORMAL
INTERNAL POSITIVE CONTROL: NORMAL
Lab: NORMAL

## 2023-03-20 PROCEDURE — 45378 DIAGNOSTIC COLONOSCOPY: CPT | Performed by: COLON & RECTAL SURGERY

## 2023-03-20 PROCEDURE — 25010000002 PROPOFOL 10 MG/ML EMULSION: Performed by: ANESTHESIOLOGY

## 2023-03-20 PROCEDURE — 81025 URINE PREGNANCY TEST: CPT | Performed by: COLON & RECTAL SURGERY

## 2023-03-20 PROCEDURE — 0 LIDOCAINE 1 % SOLUTION: Performed by: ANESTHESIOLOGY

## 2023-03-20 RX ORDER — LIDOCAINE HYDROCHLORIDE 10 MG/ML
INJECTION, SOLUTION INFILTRATION; PERINEURAL AS NEEDED
Status: DISCONTINUED | OUTPATIENT
Start: 2023-03-20 | End: 2023-03-20 | Stop reason: SURG

## 2023-03-20 RX ORDER — PROPOFOL 10 MG/ML
VIAL (ML) INTRAVENOUS AS NEEDED
Status: DISCONTINUED | OUTPATIENT
Start: 2023-03-20 | End: 2023-03-20 | Stop reason: SURG

## 2023-03-20 RX ORDER — SODIUM CHLORIDE, SODIUM LACTATE, POTASSIUM CHLORIDE, CALCIUM CHLORIDE 600; 310; 30; 20 MG/100ML; MG/100ML; MG/100ML; MG/100ML
30 INJECTION, SOLUTION INTRAVENOUS CONTINUOUS
Status: DISCONTINUED | OUTPATIENT
Start: 2023-03-20 | End: 2023-03-20 | Stop reason: HOSPADM

## 2023-03-20 RX ORDER — SODIUM CHLORIDE, SODIUM LACTATE, POTASSIUM CHLORIDE, CALCIUM CHLORIDE 600; 310; 30; 20 MG/100ML; MG/100ML; MG/100ML; MG/100ML
INJECTION, SOLUTION INTRAVENOUS CONTINUOUS PRN
Status: DISCONTINUED | OUTPATIENT
Start: 2023-03-20 | End: 2023-03-20 | Stop reason: SURG

## 2023-03-20 RX ADMIN — PROPOFOL 100 MG: 10 INJECTION, EMULSION INTRAVENOUS at 08:38

## 2023-03-20 RX ADMIN — LIDOCAINE HYDROCHLORIDE 30 MG: 10 INJECTION, SOLUTION INFILTRATION; PERINEURAL at 08:38

## 2023-03-20 RX ADMIN — PROPOFOL 200 MCG/KG/MIN: 10 INJECTION, EMULSION INTRAVENOUS at 08:38

## 2023-03-20 RX ADMIN — SODIUM CHLORIDE, POTASSIUM CHLORIDE, SODIUM LACTATE AND CALCIUM CHLORIDE 30 ML/HR: 600; 310; 30; 20 INJECTION, SOLUTION INTRAVENOUS at 08:20

## 2023-03-20 RX ADMIN — SODIUM CHLORIDE, POTASSIUM CHLORIDE, SODIUM LACTATE AND CALCIUM CHLORIDE: 600; 310; 30; 20 INJECTION, SOLUTION INTRAVENOUS at 08:39

## 2023-03-20 NOTE — ANESTHESIA PREPROCEDURE EVALUATION
Anesthesia Evaluation     Patient summary reviewed and Nursing notes reviewed   NPO Solid Status: > 6 hours  NPO Liquid Status: > 6 hours           Airway   Mallampati: II  TM distance: >3 FB  Neck ROM: full  Dental - normal exam     Pulmonary    (-) COPD, asthma, sleep apnea, not a smoker  Cardiovascular     (+) hypertension, hyperlipidemia,   (-) CAD, dysrhythmias, angina, cardiac stents      Neuro/Psych  (-) seizures, CVA  GI/Hepatic/Renal/Endo    (+)   diabetes mellitus gestational,   (-) GERD, liver disease, no renal disease, no thyroid disorder    Musculoskeletal     Abdominal    Substance History      OB/GYN          Other                        Anesthesia Plan    ASA 2     MAC       Anesthetic plan, risks, benefits, and alternatives have been provided, discussed and informed consent has been obtained with: patient.        CODE STATUS:

## 2023-03-20 NOTE — H&P
Agnes Hayes is a 52 y.o. female  who is referred by Keith Chapa MD for a colonoscopy. She   has an indications: screening for colon cancer.     She denies any change in bowel function, melena, or hematochezia.    Past Medical History:   Diagnosis Date   • Abnormal mammogram 07/2018   • Bilateral chronic knee pain    • Breast asymmetry    • Constipation    • Elevated glucose 07/2019   • Frequent UTI    • Gestational diabetes mellitus     WITH BOTH PREGNANCIES   • Hemorrhoids    • Hyperlipidemia    • Hypertension    • MITCH (stress urinary incontinence, female)        Past Surgical History:   Procedure Laterality Date   • BUNIONECTOMY Right 2008   • BUNIONECTOMY Left 2010    DR. BONITA RICHARDSON   • CHOLECYSTECTOMY N/A 2003    DR. TONI GARCIA   • CYSTOSCOPY N/A 08/2017    DR. STANLEY MILLER   • ENDOMETRIAL ABLATION N/A 2007   • ENDOSCOPY N/A 09/08/2009    CHRONIC ACTIVE GASTRITIS, DR. GLORY PACHECO AT [facility]   • PUBOVAGINAL SLING N/A 11/5/2021    Procedure: PUBO VAGINAL SLING INSERTION OF VAGINAL GRAFT CYSTORETHROSCOPY, AND REMOVAL OF PERIANAL SKIN TAGS;  Surgeon: Tiffanie Hebert MD;  Location: [facility];  Service: Gynecology;  Laterality: N/A;   • TUBAL ABDOMINAL LIGATION Bilateral 2002   • VAGINAL DELIVERY N/A 04/16/2002    DR. KOSTA KATZ AT [place]   • VAGINAL DELIVERY N/A 06/1999       Medications Prior to Admission   Medication Sig Dispense Refill Last Dose   • aspirin 81 MG EC tablet Take 1 tablet by mouth Daily. INSTRUCTED PT TO FOLLOW MD INSTRUCTIONS REGARDING HOLDING FOR SURGERY   Past Week   • B Complex-C-E-Zn (B COMPLEX-C-E-ZINC) tablet Take 1 tablet by mouth Daily. HOLD PRIOR TO SURG   Past Week   • Calcium Polycarbophil (FIBER-CAPS PO) Take  by mouth.   Past Month   • cephalexin (Keflex) 500 MG capsule Take 1 capsule by mouth immediately prior to or after intercourse for up to 10 days. 30 capsule 3 Past Week   • cetirizine (ZyrTEC) 10 MG tablet Take 1 tablet by mouth Daily.   3/19/2023   • cholecalciferol  (VITAMIN D3) 1000 UNITS tablet Take 1 tablet by mouth Daily.   Past Week   • losartan (COZAAR) 50 MG tablet Take 1 tablet by mouth Daily. 90 tablet 3 3/19/2023   • multivitamin (DAILY IRENE) tablet tablet Take 1 tablet by mouth Daily. HOLD PRIOR TO SURG   Past Week   • pravastatin (Pravachol) 20 MG tablet Take 1 tablet by mouth Daily. 90 tablet 3 3/19/2023   • urea (Urea 20 Intensive Hydrating) 20 % cream Apply two times per day to the rough skin areas 85 g 5        Allergies   Allergen Reactions   • Bactrim [Sulfamethoxazole-Trimethoprim] Diarrhea     patient states she does not want to ever take Bactrim again       Family History   Problem Relation Age of Onset   • Heart disease Mother    • Heart attack Mother          age 69   • Stroke Mother    • Hypertension Mother    • Diabetes Mother    • Heart disease Father    • Heart attack Father          age 71   • Hypertension Father    • Schizophrenia Brother    • Mental illness Brother    • Breast cancer Neg Hx    • Ovarian cancer Neg Hx    • Uterine cancer Neg Hx    • Colon cancer Neg Hx    • Malig Hyperthermia Neg Hx        Social History     Socioeconomic History   • Marital status:    Tobacco Use   • Smoking status: Never   • Smokeless tobacco: Never   Vaping Use   • Vaping Use: Never used   Substance and Sexual Activity   • Alcohol use: No   • Drug use: No   • Sexual activity: Yes     Partners: Male     Birth control/protection: Surgical     Comment: tubal. .       Review of Systems   Gastrointestinal: Negative for abdominal pain, nausea and vomiting.   All other systems reviewed and are negative.      Vitals:    23 0821   BP:    Pulse:    Resp:    Temp: 97.7 °F (36.5 °C)   SpO2:          Physical Exam  Constitutional:       Appearance: She is well-developed.   HENT:      Head: Normocephalic and atraumatic.   Eyes:      Pupils: Pupils are equal, round, and reactive to light.   Cardiovascular:      Rate and Rhythm: Regular rhythm.    Pulmonary:      Effort: Pulmonary effort is normal.   Abdominal:      General: There is no distension.      Palpations: Abdomen is soft.   Musculoskeletal:         General: Normal range of motion.   Skin:     General: Skin is warm and dry.   Neurological:      Mental Status: She is alert and oriented to person, place, and time.   Psychiatric:         Thought Content: Thought content normal.         Judgment: Judgment normal.           Assessment & Plan      indications: screening for colon cancer         I recommend colonoscopy.  I described risks, benefits of the procedure with the patient including but not limited to bleeding, infection, possibility of perforation and possible polypectomy. All of the patient's questions were answered and they would like to proceed with the above recommendations.

## 2023-03-20 NOTE — ANESTHESIA POSTPROCEDURE EVALUATION
Patient: Agnes Hayes    Procedure Summary     Date: 03/20/23 Room / Location:  [facility]    Anesthesia Start: 0834 Anesthesia Stop: 0902    Procedure: COLONOSCOPY TO CECUM Diagnosis:       Encounter for screening colonoscopy      (Encounter for screening colonoscopy [Z12.11])    Surgeons: Keith Chapa MD Provider: Marlon Barrera MD    Anesthesia Type: MAC ASA Status: 2          Anesthesia Type: MAC    Vitals  Vitals Value Taken Time   /78 03/20/23 0922   Temp     Pulse 60 03/20/23 0922   Resp 16 03/20/23 0922   SpO2 100 % 03/20/23 0922           Post Anesthesia Care and Evaluation    Level of consciousness: awake  Pain management: satisfactory to patient    Airway patency: patent  Anesthetic complications: No anesthetic complications  PONV Status: controlled  Cardiovascular status: acceptable  Respiratory status: acceptable  Hydration status: acceptable

## 2023-05-25 ENCOUNTER — OFFICE VISIT (OUTPATIENT)
Dept: CARDIOLOGY | Facility: CLINIC | Age: 53
End: 2023-05-25
Payer: COMMERCIAL

## 2023-05-25 VITALS
BODY MASS INDEX: 22.66 KG/M2 | DIASTOLIC BLOOD PRESSURE: 78 MMHG | WEIGHT: 141 LBS | HEIGHT: 66 IN | HEART RATE: 63 BPM | SYSTOLIC BLOOD PRESSURE: 122 MMHG

## 2023-05-25 DIAGNOSIS — E78.5 HYPERLIPIDEMIA, UNSPECIFIED HYPERLIPIDEMIA TYPE: Primary | ICD-10-CM

## 2023-05-25 PROCEDURE — 99214 OFFICE O/P EST MOD 30 MIN: CPT | Performed by: INTERNAL MEDICINE

## 2023-05-25 PROCEDURE — 93000 ELECTROCARDIOGRAM COMPLETE: CPT | Performed by: INTERNAL MEDICINE

## 2023-05-25 NOTE — PROGRESS NOTES
Subjective:     Encounter Date: 23      Patient ID: Agnes Hayes is a 53 y.o. female.    Chief Complaint: Cardiovascular risk assessment  HPI:   This is a 53-year-old woman who presents for evaluation.  She has a strong family history of coronary disease.  Her mother had a stroke at the age of 69.  Her father had coronary artery bypass graft surgery at the age of 60.  She herself has a history of hypertension and hyperlipidemia.  Her LDL has been stable in the 120s for some time.  Most recently in 2022 a jump to 140.  Based on those numbers her ASCVD risk of 10 years is only 1%.  She was started on pravastatin 20.  This has upset her as she does not really feel like she wants to take any additional medication.  She eats a plant based protein diet with occasional chicken and cheese but not frequent animal protein.  She does the elliptical for exercise 2 or 3 times a week.  Her weight is stable.  She feels well otherwise.   she had a calcium score of 0.    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  She does not drink alcohol.    The following portions of the patient's history were reviewed and updated as appropriate: allergies, current medications, past family history, past medical history, past social history, past surgical history and problem list.     REVIEW OF SYSTEMS:   All systems reviewed.  Pertinent positives identified in HPI.  All other systems are negative.    Past Medical History:   Diagnosis Date   • Abnormal mammogram 2018   • Bilateral chronic knee pain    • Breast asymmetry    • Constipation    • Elevated glucose 2019   • Frequent UTI    • Gestational diabetes mellitus     WITH BOTH PREGNANCIES   • Hemorrhoids    • Hyperlipidemia    • Hypertension    • MITCH (stress urinary incontinence, female)        Family History   Problem Relation Age of Onset   • Heart disease Mother    • Heart attack Mother          age 69   • Stroke Mother    •  Hypertension Mother    • Diabetes Mother    • Heart disease Father    • Heart attack Father          age 71   • Hypertension Father    • Schizophrenia Brother    • Mental illness Brother    • Breast cancer Neg Hx    • Ovarian cancer Neg Hx    • Uterine cancer Neg Hx    • Colon cancer Neg Hx    • Malig Hyperthermia Neg Hx        Social History     Socioeconomic History   • Marital status:    Tobacco Use   • Smoking status: Never   • Smokeless tobacco: Never   Vaping Use   • Vaping Use: Never used   Substance and Sexual Activity   • Alcohol use: No   • Drug use: No   • Sexual activity: Yes     Partners: Male     Birth control/protection: Surgical     Comment: tubal. .       Allergies   Allergen Reactions   • Bactrim [Sulfamethoxazole-Trimethoprim] Diarrhea     patient states she does not want to ever take Bactrim again       Past Surgical History:   Procedure Laterality Date   • BUNIONECTOMY Right    • BUNIONECTOMY Left     DR. BONITA RICHARDSON   • CHOLECYSTECTOMY N/A     DR. TONI GARCIA   • COLONOSCOPY N/A 2023    STOOL IN ENTIRE COLON INTERFERING WITH VISUALIZATION, RESCOPE IN 2 YRS, DR. CHASTITY ACEVEDO AT [facility]   • CYSTOSCOPY N/A 2017    DR. STANLEY MILLER   • ENDOMETRIAL ABLATION N/A    • ENDOSCOPY N/A 2009    CHRONIC ACTIVE GASTRITIS, DR. GLORY PACHECO AT [facility]   • PUBOVAGINAL SLING N/A 2021    Procedure: PUBO VAGINAL SLING INSERTION OF VAGINAL GRAFT CYSTORETHROSCOPY, AND REMOVAL OF PERIANAL SKIN TAGS;  Surgeon: Tiffanie Hebert MD;  Location: [facility];  Service: Gynecology;  Laterality: N/A;   • TUBAL ABDOMINAL LIGATION Bilateral    • VAGINAL DELIVERY N/A 2002    DR. KOSTA KATZ AT [place]   • VAGINAL DELIVERY N/A 1999         ECG 12 Lead    Date/Time: 2023 10:35 AM  Performed by: Elsa Moreno MD  Authorized by: Elsa Moreno MD   Comparison: compared with previous ECG from 5/10/2022  Similar to previous ECG  Rhythm: sinus rhythm  Rate:  normal  Conduction: conduction normal  Q waves: V1, V2 and V3    ST Segments: ST segments normal  T Waves: T waves normal  QRS axis: normal    Clinical impression: non-specific ECG               Objective:         PHYSICAL EXAM:  GEN: VSS, no distress,   Eyes: normal sclera, normal lids and lashes  HENT: moist mucus membranes,   Respiratory: CTAB, no rales or wheezes  CV: RRR, no murmurs, , +2 DP and 2+ carotid pulses b/l  GI: NABS, soft,  Nontender, nondistended  MSK: no edema, no scoliosis or kyphosis  Skin: no rash, warm, dry  Heme/Lymph: no bruising or bleeding  Psych: organized thought, normal behavior and affect  Neuro: Cranial nerves grossly intact, Alert and Oriented x 3.         Assessment:          Diagnosis Plan   1. Hyperlipidemia, unspecified hyperlipidemia type               Plan:       1.  Hyperlipidemia, .  ASCVD risk is 1%.  She does not require a statin, especially in light of the fact that her coronary calcium score is 0.  Given that she is unhappy about being on a statin I instructed her to discontinue the pravastatin, increase her exercise frequency to 4-5 times per week, and repeat her labs in 3 to 6 months.      Dr. Zamudio thank you very much for referring this kind patient to me. Please call me with any questions or concerns. I will see the patient again in the office in 12 months.         Elsa Moreno MD  05/25/23  [facility]    Outpatient Encounter Medications as of 5/25/2023   Medication Sig Dispense Refill   • aspirin 81 MG EC tablet Take 1 tablet by mouth Daily. INSTRUCTED PT TO FOLLOW MD INSTRUCTIONS REGARDING HOLDING FOR SURGERY     • B Complex-C-E-Zn (B COMPLEX-C-E-ZINC) tablet Take 1 tablet by mouth Daily. HOLD PRIOR TO SURG     • Calcium Polycarbophil (FIBER-CAPS PO) Take  by mouth.     • cephalexin (Keflex) 500 MG capsule Take 1 capsule by mouth immediately prior to or after intercourse for up to 10 days. 30 capsule 3   • cetirizine (ZyrTEC) 10 MG tablet  Take 1 tablet by mouth Daily.     • cholecalciferol (VITAMIN D3) 1000 UNITS tablet Take 1 tablet by mouth Daily.     • losartan (COZAAR) 50 MG tablet Take 1 tablet by mouth Daily. 90 tablet 3   • multivitamin (DAILY IRENE) tablet tablet Take 1 tablet by mouth Daily. HOLD PRIOR TO SURG     • pravastatin (Pravachol) 20 MG tablet Take 1 tablet by mouth Daily. 90 tablet 3     No facility-administered encounter medications on file as of 5/25/2023.

## 2023-05-26 ENCOUNTER — TELEPHONE (OUTPATIENT)
Dept: CARDIOLOGY | Facility: CLINIC | Age: 53
End: 2023-05-26

## 2023-05-26 NOTE — TELEPHONE ENCOUNTER
Pt is asking about a Dietician, possibly a referral? Do you know if we have any Dietician's at ? I didn't see any, but you may know someone.    Thanks!    Tara

## 2023-06-01 DIAGNOSIS — E78.5 HYPERLIPIDEMIA LDL GOAL <100: Primary | ICD-10-CM

## 2023-06-01 NOTE — TELEPHONE ENCOUNTER
I do not know of anyone in particular.  Please let her know I referred her to the nutrition counselor here.  Not all insurance will pay for it but it is worth giving it a try.

## 2023-08-28 ENCOUNTER — OFFICE VISIT (OUTPATIENT)
Dept: OBSTETRICS AND GYNECOLOGY | Age: 53
End: 2023-08-28
Payer: COMMERCIAL

## 2023-08-28 VITALS
WEIGHT: 143 LBS | SYSTOLIC BLOOD PRESSURE: 126 MMHG | HEIGHT: 66 IN | BODY MASS INDEX: 22.98 KG/M2 | DIASTOLIC BLOOD PRESSURE: 72 MMHG

## 2023-08-28 DIAGNOSIS — Z01.419 WELL WOMAN EXAM WITH ROUTINE GYNECOLOGICAL EXAM: Primary | ICD-10-CM

## 2023-08-28 NOTE — PROGRESS NOTES
Routine Annual Visit    2023    Patient: Agnes Hayes          MR#:[id number]      Chief Complaint   Patient presents with    Gynecologic Exam     Annual exam, Last Pap 2020 NEG, HPV NEG, Mammogram 2022, Colonoscopy 2023, Pt has no complaints today, Doing well        History of Present Illness    53 y.o. female  who presents for annual exam.   When has menses they will last for a week and then also having random breakthrough bleeding. Hx of ablation but it had never stopped her menses. Some occasional hot flashes, nothing too bad.       Patient's last menstrual period was 2023 (exact date).  Obstetric History:  OB History          2    Para   2    Term   2       0    AB   0    Living   2         SAB        IAB        Ectopic        Molar        Multiple        Live Births                   Menstrual History:     Patient's last menstrual period was 2023 (exact date).       ________________________________________  Patient Active Problem List   Diagnosis    Hypertension    Hyperlipidemia    Slow transit constipation    Family history of heart disease    Recurrent UTI    Incontinence in female    Hemorrhoids    Family history of diabetes mellitus    Screening for colorectal cancer    Female stress incontinence    Healthcare maintenance    Vitamin D deficiency       Past Medical History:   Diagnosis Date    Abnormal mammogram 2018    Bilateral chronic knee pain     Breast asymmetry     Constipation     Elevated glucose 2019    Frequent UTI     Gestational diabetes mellitus     WITH BOTH PREGNANCIES    Hemorrhoids     Hyperlipidemia     Hypertension     MITCH (stress urinary incontinence, female)        Family History   Problem Relation Age of Onset    Heart disease Mother     Heart attack Mother          age 69    Stroke Mother     Hypertension Mother     Diabetes Mother     Heart disease Father     Heart attack Father          age 71    Hypertension  "Father     Schizophrenia Brother     Mental illness Brother     Breast cancer Neg Hx     Ovarian cancer Neg Hx     Uterine cancer Neg Hx     Colon cancer Neg Hx     Malig Hyperthermia Neg Hx        Past Surgical History:   Procedure Laterality Date    BUNIONECTOMY Right 2008    BUNIONECTOMY Left 2010    DR. BONITA RICHARDSON    CHOLECYSTECTOMY N/A 2003    DR. TONI GARCIA    COLONOSCOPY N/A 03/20/2023    STOOL IN ENTIRE COLON INTERFERING WITH VISUALIZATION, RESCOPE IN 2 YRS, DR. CHASTITY ACEVEDO AT [facility]    CYSTOSCOPY N/A 08/2017    DR. STANLEY MILLER    ENDOMETRIAL ABLATION N/A 2007    ENDOSCOPY N/A 09/08/2009    CHRONIC ACTIVE GASTRITIS, DR. GLORY PACHECO AT [facility]    PUBOVAGINAL SLING N/A 11/05/2021    Procedure: PUBO VAGINAL SLING INSERTION OF VAGINAL GRAFT CYSTORETHROSCOPY, AND REMOVAL OF PERIANAL SKIN TAGS;  Surgeon: Tiffanie Hebert MD;  Location: [facility];  Service: Gynecology;  Laterality: N/A;    TUBAL ABDOMINAL LIGATION Bilateral 2002    VAGINAL DELIVERY N/A 04/16/2002    DR. KOSTA KATZ AT [place]    VAGINAL DELIVERY N/A 06/1999       Social History     Tobacco Use   Smoking Status Never   Smokeless Tobacco Never       has a current medication list which includes the following prescription(s): aspirin, b complex-c-e-zinc, calcium polycarbophil, cephalexin, cetirizine, cholecalciferol, losartan, multivitamin, and [DISCONTINUED] pravastatin.  ________________________________________      Review of Systems   Constitutional:  Negative for fever and unexpected weight change.   Respiratory:  Negative for shortness of breath.    Cardiovascular:  Negative for chest pain.   Gastrointestinal:  Negative for abdominal pain, constipation and diarrhea.   Genitourinary:  Negative for frequency and urgency.   Hematological:  Negative for adenopathy.   Psychiatric/Behavioral:  Negative for dysphoric mood.      Objective   Physical Exam    /72   Ht 167.6 cm (66\")   Wt 64.9 kg (143 lb)   LMP 08/12/2023 (Exact Date)   BMI " 23.08 kg/mý    BP Readings from Last 3 Encounters:   08/28/23 126/72   05/25/23 122/78   03/20/23 129/78      Wt Readings from Last 3 Encounters:   08/28/23 64.9 kg (143 lb)   05/25/23 64 kg (141 lb)   03/20/23 62.1 kg (137 lb)         BMI: Body mass index is 23.08 kg/mý.       General:   alert, appears stated age, and cooperative   Neck: No thyromegaly or LAD   Heart:: regular rate and rhythm, S1, S2 normal, no murmur, click, rub or gallop   Lungs: normal respiratory effort and auscultation   Abdomen: soft, non-tender, without masses or organomegaly   Breast: inspection negative, no nipple discharge or bleeding, no masses or nodularity palpable   Urethra and bladder: urethral meatus normal; bladder nontender to palpation;   Vulva: normal, Bartholin's, Urethra, [place]'s normal   Vagina: normal mucosa, normal discharge   Cervix: multiparous appearance and no lesions   Uterus: normal size, non-tender, and mid plane    Adnexa: normal adnexa and no mass, fullness, tenderness       Assessment:    normal annual exam   Breast cancer screening  AUB    Plan:    Plan     [x]  Mammogram request made  [x]  PAP done  []  Labs:   []  GC/Chl/TV  []  DEXA scan   []  Referral for colonoscopy:     Return for sometime in the next six weeks or so gyn follow up with ultrasound, and 1 yr annual.      Counseling  [x]  Nutrition  [x]  Physical activity/regular exercise   [x]  Healthy weight  []  Injury prevention  []  Smoking cessation  []  Substance misuse/abuse  [x]  Sexual behavior  []  STD prevention  []  Contraception  []  Dental health  []  Mental health  []  Immunization  [x]  Encouraged SBE        Karen Arredondo MD  08/28/2023  09:27 EDT

## 2023-11-04 DIAGNOSIS — I10 HYPERTENSION, BENIGN: ICD-10-CM

## 2023-11-04 RX ORDER — LOSARTAN POTASSIUM 50 MG/1
50 TABLET ORAL DAILY
Qty: 90 TABLET | Refills: 3 | Status: CANCELLED | OUTPATIENT
Start: 2023-11-04

## 2023-11-05 DIAGNOSIS — I10 HYPERTENSION, BENIGN: ICD-10-CM

## 2023-11-06 RX ORDER — LOSARTAN POTASSIUM 50 MG/1
50 TABLET ORAL DAILY
Qty: 30 TABLET | Refills: 0 | Status: SHIPPED | OUTPATIENT
Start: 2023-11-06

## 2023-11-15 ENCOUNTER — OFFICE VISIT (OUTPATIENT)
Dept: OBSTETRICS AND GYNECOLOGY | Age: 53
End: 2023-11-15
Payer: COMMERCIAL

## 2023-11-15 ENCOUNTER — OFFICE VISIT (OUTPATIENT)
Dept: FAMILY MEDICINE CLINIC | Facility: CLINIC | Age: 53
End: 2023-11-15
Payer: COMMERCIAL

## 2023-11-15 VITALS
SYSTOLIC BLOOD PRESSURE: 142 MMHG | HEIGHT: 66 IN | OXYGEN SATURATION: 100 % | WEIGHT: 139.2 LBS | DIASTOLIC BLOOD PRESSURE: 60 MMHG | BODY MASS INDEX: 22.37 KG/M2 | TEMPERATURE: 98.4 F | HEART RATE: 73 BPM

## 2023-11-15 VITALS
HEIGHT: 66 IN | DIASTOLIC BLOOD PRESSURE: 78 MMHG | BODY MASS INDEX: 22.66 KG/M2 | SYSTOLIC BLOOD PRESSURE: 136 MMHG | WEIGHT: 141 LBS

## 2023-11-15 DIAGNOSIS — N93.9 ABNORMAL UTERINE BLEEDING (AUB): Primary | ICD-10-CM

## 2023-11-15 DIAGNOSIS — Z83.3 FAMILY HISTORY OF DIABETES MELLITUS: ICD-10-CM

## 2023-11-15 DIAGNOSIS — E55.9 VITAMIN D DEFICIENCY: ICD-10-CM

## 2023-11-15 DIAGNOSIS — E78.49 OTHER HYPERLIPIDEMIA: ICD-10-CM

## 2023-11-15 DIAGNOSIS — Z00.00 HEALTHCARE MAINTENANCE: Primary | ICD-10-CM

## 2023-11-15 DIAGNOSIS — I10 HYPERTENSION, BENIGN: ICD-10-CM

## 2023-11-15 DIAGNOSIS — Z23 IMMUNIZATION DUE: ICD-10-CM

## 2023-11-15 PROCEDURE — 99396 PREV VISIT EST AGE 40-64: CPT | Performed by: FAMILY MEDICINE

## 2023-11-15 PROCEDURE — 90750 HZV VACC RECOMBINANT IM: CPT | Performed by: FAMILY MEDICINE

## 2023-11-15 RX ORDER — LOSARTAN POTASSIUM 50 MG/1
50 TABLET ORAL DAILY
Qty: 90 TABLET | Refills: 3 | Status: SHIPPED | OUTPATIENT
Start: 2023-11-15

## 2023-11-15 NOTE — PROGRESS NOTES
"Subjective     Chief Complaint   Patient presents with    Gynecologic Exam     Follow up irregular bleeding with US       Agnes Hayes is a 53 y.o.  whose LMP is No LMP recorded. (Menstrual status: Tubal ligation). presents with some abnormal bleeding. This has been going on for a while that she will have some persistent spotting after menses. Is not super heavy and no pain associated.  Hx of endometrial ablation      No Additional Complaints Reported    The following portions of the patient's history were reviewed and updated as appropriate:vital signs, allergies, current medications, past medical history, past social history, past surgical history, and problem list      Objective      /78   Ht 167.6 cm (65.98\")   Wt 64 kg (141 lb)   BMI 22.77 kg/m²     Physical Exam  Well, no distress  Regular, nonlabored breathing  TVUS with arcuate shape to uterus but otherwise normal, thin homogenous endometrium    Assessment & Plan     Diagnoses and all orders for this visit:    1. Abnormal uterine bleeding (AUB) (Primary)      Possibly the ablation was incomplete due to uterine shape. Discussed some options to help with bleeding but she desires to observe for now      Follow up for annual exam    Karen Arredondo MD  11/15/2023             "

## 2023-11-15 NOTE — PROGRESS NOTES
"Agnes Hayes is here today for an annual physical exam.     Eating a healthy diet. Exercising routinely but could work harder.    Periods are starting to get irregular-seeing gyn for this today. Wears seat belt. Feels safe at home.   Sexual activity: yes  Birth control: btl  Pregnancy:      PHQ-2 Depression Screening  Little interest or pleasure in doing things? 0-->not at all   Feeling down, depressed, or hopeless? 0-->not at all   PHQ-2 Total Score 0         I have reviewed the patient's medical, family, and social history in detail and updated the computerized patient record.    Screening history:  Colonoscopy - utd  Mammogram utd, Pap/pelvic - utd  Metabolic - due    Health Maintenance   Topic Date Due    ZOSTER VACCINE (1 of 2) Never done    ANNUAL PHYSICAL  10/07/2023    LIPID PANEL  10/07/2023    MAMMOGRAM  2023    COLORECTAL CANCER SCREENING  2025    PAP SMEAR  2026    TDAP/TD VACCINES (2 - Td or Tdap) 2027    HEPATITIS C SCREENING  Completed    COVID-19 Vaccine  Completed    INFLUENZA VACCINE  Completed    Pneumococcal Vaccine 0-64  Aged Out       Review of Systems   Constitutional:  Negative for fever.   HENT:  Negative for hearing loss.    Eyes:  Negative for visual disturbance.   Respiratory:  Negative for shortness of breath.    Cardiovascular:  Negative for chest pain.   Gastrointestinal:  Negative for constipation and diarrhea.   Genitourinary:  Negative for difficulty urinating.   Musculoskeletal:  Negative for arthralgias and myalgias.   Skin:  Negative for rash.   Hematological:  Does not bruise/bleed easily.   Psychiatric/Behavioral:  Negative for dysphoric mood.        /60 (BP Location: Left arm, Patient Position: Sitting, Cuff Size: Adult)   Pulse 73   Temp 98.4 °F (36.9 °C)   Ht 167.6 cm (65.98\")   Wt 63.1 kg (139 lb 3.2 oz)   SpO2 100%   BMI 22.48 kg/m²      Physical Exam    Vital signs reviewed.  General appearance: No acute distress  Eyes: conjunctiva " clear without erythema; pupils equally round and reactive  ENT: external ears and nose normal; hearing normal, oropharynx clear  Neck: supple; no thyromegaly  CV: normal rate and rhythm; no peripheral edema  Respiratory: normal respiratory effort; lungs clear to auscultation bilaterally  MSK: normal gait and station; no focal joint deformity or swelling  Skin: no rash or wounds; normal turgor  Neuro: cranial nerves 2-12 grossly intact; normal sensation to light touch  Psych: mood and affect normal; recent and remote memory intact    No visits with results within 2 Week(s) from this visit.   Latest known visit with results is:   Office Visit on 08/28/2023   Component Date Value Ref Range Status    Diagnosis 08/28/2023 Comment   Final    NEGATIVE FOR INTRAEPITHELIAL LESION OR MALIGNANCY.    Specimen adequacy: 08/28/2023 Comment   Final    Comment: Satisfactory for evaluation.  Endocervical and/or squamous metaplastic  cells (endocervical component) are present.      Clinician Provided ICD-10: 08/28/2023 Comment   Final    Z01.419    Performed by: 08/28/2023 Comment   Final    Juan J Vaughn, Cytotechnologist (ASCP)    . 08/28/2023 .   Final    Note: 08/28/2023 Comment   Final    Comment: The Pap smear is a screening test designed to aid in the detection of  premalignant and malignant conditions of the uterine cervix.  It is not a  diagnostic procedure and should not be used as the sole means of detecting  cervical cancer.  Both false-positive and false-negative reports do occur.      Method: 08/28/2023 Comment   Final    Comment: This liquid based ThinPrep(R) pap test was screened with the  use of an image guided system.      HPV Aptima 08/28/2023 Negative  Negative Final    Comment: This nucleic acid amplification test detects fourteen high-risk  HPV types (16,18,31,33,35,39,45,51,52,56,58,59,66,68) without  differentiation.           Current Outpatient Medications:     aspirin 81 MG EC tablet, Take 1 tablet by mouth  Daily. INSTRUCTED PT TO FOLLOW MD INSTRUCTIONS REGARDING HOLDING FOR SURGERY, Disp: , Rfl:     B Complex-C-E-Zn (B COMPLEX-C-E-ZINC) tablet, Take 1 tablet by mouth Daily. HOLD PRIOR TO SURG, Disp: , Rfl:     Calcium Polycarbophil (FIBER-CAPS PO), Take  by mouth., Disp: , Rfl:     cephalexin (Keflex) 500 MG capsule, Take 1 capsule by mouth immediately prior to or after intercourse for up to 10 days., Disp: 30 capsule, Rfl: 3    cetirizine (ZyrTEC) 10 MG tablet, Take 1 tablet by mouth Daily., Disp: , Rfl:     cholecalciferol (VITAMIN D3) 1000 UNITS tablet, Take 1 tablet by mouth Daily., Disp: , Rfl:     losartan (COZAAR) 50 MG tablet, Take 1 tablet by mouth Daily., Disp: 90 tablet, Rfl: 3    multivitamin (DAILY IRENE) tablet tablet, Take 1 tablet by mouth Daily. HOLD PRIOR TO SURG, Disp: , Rfl:     Diagnoses and all orders for this visit:    1. Healthcare maintenance (Primary)    2. Family history of diabetes mellitus  -     Hemoglobin A1c    3. Other hyperlipidemia  -     Comprehensive Metabolic Panel  -     Lipid Panel    4. Vitamin D deficiency  -     Vitamin D,25-Hydroxy    5. Immunization due  -     Shingrix Vaccine    6. Hypertension, benign  -     losartan (COZAAR) 50 MG tablet; Take 1 tablet by mouth Daily.  Dispense: 90 tablet; Refill: 3          Encourage healthy diet and exercise.  Encourage patient to stay up to date on screening examinations as indicated based on age and risk factors. F/U yearly.     She stopped her pravastatin and wants to see what her cholesterol is off meds.

## 2023-11-16 LAB
25(OH)D3+25(OH)D2 SERPL-MCNC: 35.6 NG/ML (ref 30–100)
ALBUMIN SERPL-MCNC: 4.6 G/DL (ref 3.8–4.9)
ALBUMIN/GLOB SERPL: 2 {RATIO} (ref 1.2–2.2)
ALP SERPL-CCNC: 76 IU/L (ref 44–121)
ALT SERPL-CCNC: 26 IU/L (ref 0–32)
AST SERPL-CCNC: 30 IU/L (ref 0–40)
BILIRUB SERPL-MCNC: 0.4 MG/DL (ref 0–1.2)
BUN SERPL-MCNC: 11 MG/DL (ref 6–24)
BUN/CREAT SERPL: 16 (ref 9–23)
CALCIUM SERPL-MCNC: 9.5 MG/DL (ref 8.7–10.2)
CHLORIDE SERPL-SCNC: 100 MMOL/L (ref 96–106)
CHOLEST SERPL-MCNC: 210 MG/DL (ref 100–199)
CO2 SERPL-SCNC: 30 MMOL/L (ref 20–29)
CREAT SERPL-MCNC: 0.68 MG/DL (ref 0.57–1)
EGFRCR SERPLBLD CKD-EPI 2021: 105 ML/MIN/1.73
GLOBULIN SER CALC-MCNC: 2.3 G/DL (ref 1.5–4.5)
GLUCOSE SERPL-MCNC: 94 MG/DL (ref 70–99)
HBA1C MFR BLD: 5.1 % (ref 4.8–5.6)
HDLC SERPL-MCNC: 72 MG/DL
LDLC SERPL CALC-MCNC: 125 MG/DL (ref 0–99)
Lab: NORMAL
POTASSIUM SERPL-SCNC: 4.4 MMOL/L (ref 3.5–5.2)
PROT SERPL-MCNC: 6.9 G/DL (ref 6–8.5)
SODIUM SERPL-SCNC: 141 MMOL/L (ref 134–144)
TRIGL SERPL-MCNC: 74 MG/DL (ref 0–149)
VLDLC SERPL CALC-MCNC: 13 MG/DL (ref 5–40)

## 2023-12-18 ENCOUNTER — HOSPITAL ENCOUNTER (OUTPATIENT)
Dept: MAMMOGRAPHY | Facility: HOSPITAL | Age: 53
Discharge: HOME | End: 2023-12-18
Admitting: FAMILY MEDICINE
Payer: COMMERCIAL

## 2023-12-18 DIAGNOSIS — Z12.31 SCREENING MAMMOGRAM FOR HIGH-RISK PATIENT: ICD-10-CM

## 2023-12-18 PROCEDURE — 77067 SCR MAMMO BI INCL CAD: CPT

## 2023-12-18 PROCEDURE — 77063 BREAST TOMOSYNTHESIS BI: CPT

## 2023-12-21 ENCOUNTER — TELEPHONE (OUTPATIENT)
Dept: FAMILY MEDICINE CLINIC | Facility: CLINIC | Age: 53
End: 2023-12-21

## 2023-12-21 NOTE — TELEPHONE ENCOUNTER
Caller: Agnes Hayes    Relationship: Self    Best call back number: [phone]    What orders are you requesting (i.e. lab or imaging): MAMMOGRAM (ADDITIONAL ORDERS) AND ULTRASOUND     In what timeframe would the patient need to come in: ASAP    Where will you receive your lab/imaging services: [facility] [place]     Additional notes: PATIENT STATES THAT THE DOCTOR WHO PREFORMED HER MAMMOGRAM STATES THAT THE RESULTS WERE ABNORMAL OR INCONCLUSIVE. PATIENT STATES THAT THEY REQUESTED THAT SHE HAS MORE IMAGINING DONE WITH MAMMOGRAM AND ULTRASOUND. PLEASE ADVISE PATIENT IF THESE RESULTS HAVE BEEN REVIEWED BY DR. PEDERSON AND ADVISE IF THESE ORDERS CAN BE PLACED.

## 2023-12-26 DIAGNOSIS — R92.8 ABNORMAL MAMMOGRAM OF RIGHT BREAST: Primary | ICD-10-CM

## 2024-01-04 ENCOUNTER — PATIENT MESSAGE (OUTPATIENT)
Dept: OBSTETRICS AND GYNECOLOGY | Age: 54
End: 2024-01-04
Payer: COMMERCIAL

## 2024-01-04 NOTE — TELEPHONE ENCOUNTER
From: Agnes Hayes  To: Karen Arredondo  Sent: 1/4/2024 8:47 AM EST  Subject: Cephalexin 500mg     Hi. I don't need a refill yet, but I noticed that my cephalexin I take after intercourse was removed from my medication list. Can you add it back? No idea who deleted it. Thanks.

## 2024-01-08 RX ORDER — CEPHALEXIN 500 MG/1
500 CAPSULE ORAL AS NEEDED
COMMUNITY

## 2024-01-18 ENCOUNTER — HOSPITAL ENCOUNTER (OUTPATIENT)
Dept: MAMMOGRAPHY | Facility: HOSPITAL | Age: 54
Discharge: HOME | End: 2024-01-18
Payer: COMMERCIAL

## 2024-01-18 ENCOUNTER — CLINICAL SUPPORT (OUTPATIENT)
Dept: FAMILY MEDICINE CLINIC | Facility: CLINIC | Age: 54
End: 2024-01-18
Payer: COMMERCIAL

## 2024-01-18 ENCOUNTER — HOSPITAL ENCOUNTER (OUTPATIENT)
Dept: ULTRASOUND IMAGING | Facility: HOSPITAL | Age: 54
Discharge: HOME | End: 2024-01-18
Payer: COMMERCIAL

## 2024-01-18 DIAGNOSIS — R92.8 ABNORMAL MAMMOGRAM OF RIGHT BREAST: ICD-10-CM

## 2024-01-18 DIAGNOSIS — Z23 IMMUNIZATION DUE: Primary | ICD-10-CM

## 2024-01-18 PROCEDURE — G0279 TOMOSYNTHESIS, MAMMO: HCPCS

## 2024-01-18 PROCEDURE — 77065 DX MAMMO INCL CAD UNI: CPT

## 2024-01-18 PROCEDURE — 76642 ULTRASOUND BREAST LIMITED: CPT

## 2024-01-22 DIAGNOSIS — R92.8 ABNORMAL MAMMOGRAM OF RIGHT BREAST: Primary | ICD-10-CM

## 2024-02-14 RX ORDER — CEPHALEXIN 500 MG/1
500 CAPSULE ORAL AS NEEDED
Qty: 30 CAPSULE | Refills: 1 | OUTPATIENT
Start: 2024-02-14

## 2024-02-19 RX ORDER — CEPHALEXIN 500 MG/1
500 CAPSULE ORAL AS NEEDED
OUTPATIENT
Start: 2024-02-19

## 2024-02-21 RX ORDER — CEPHALEXIN 500 MG/1
500 CAPSULE ORAL DAILY PRN
Qty: 30 CAPSULE | Refills: 3 | Status: SHIPPED | OUTPATIENT
Start: 2024-02-21

## 2024-05-28 ENCOUNTER — OFFICE VISIT (OUTPATIENT)
Dept: CARDIOLOGY | Facility: CLINIC | Age: 54
End: 2024-05-28
Payer: COMMERCIAL

## 2024-05-28 VITALS
HEART RATE: 62 BPM | BODY MASS INDEX: 23.27 KG/M2 | SYSTOLIC BLOOD PRESSURE: 124 MMHG | HEIGHT: 66 IN | DIASTOLIC BLOOD PRESSURE: 82 MMHG | WEIGHT: 144.8 LBS

## 2024-05-28 DIAGNOSIS — I10 PRIMARY HYPERTENSION: Primary | ICD-10-CM

## 2024-05-28 DIAGNOSIS — E78.00 PURE HYPERCHOLESTEROLEMIA: ICD-10-CM

## 2024-05-28 PROCEDURE — 93000 ELECTROCARDIOGRAM COMPLETE: CPT | Performed by: NURSE PRACTITIONER

## 2024-05-28 PROCEDURE — 99214 OFFICE O/P EST MOD 30 MIN: CPT | Performed by: NURSE PRACTITIONER

## 2024-05-28 NOTE — PROGRESS NOTES
Date of Office Visit: 2024  Encounter Provider: HENRY Trujillo  Place of Service: [facility] CARDIOLOGY  Patient Name: Agnes Hayes  :1970    Chief complaint:  Dyslipidemia    HPI: Agnes Hayes is a 54 y.o. female who is a patient of  Dr. Moreno and is new to me today.  She has a history of coronary artery disease in her family.  Her mom had a stroke at age of 69 and her father had coronary artery bypass graft surgery at the age of 60.  She herself has hypertension and hyperlipidemia.  Her LDL has been in the 120s to 140s.  At her last appointment prior to that she had been started on pravastatin 20 mg she really did not want to have to take a statin.  She exercises 2 to 3 days a week.  She had a calcium score which was 0.    She comes in today for follow-up.  She denies any chest pain, pressure or tightness.  EKG is unremarkable.  She is still exercising about the same.  She has not had her labs done since November but is due to follow-up with Dr. Mayorga.    Previous testing and notes have been reviewed by me.   Past Medical History:   Diagnosis Date    Abnormal mammogram 2018    Bilateral chronic knee pain     Breast asymmetry     Constipation     Elevated glucose 2019    Frequent UTI     Gestational diabetes mellitus     WITH BOTH PREGNANCIES    Healthcare maintenance 10/07/2022    Hemorrhoids     Hyperlipidemia     Hypertension     MITCH (stress urinary incontinence, female)        Past Surgical History:   Procedure Laterality Date    BUNIONECTOMY Right     BUNIONECTOMY Left     DR. BONITA RICHARDSON    CHOLECYSTECTOMY N/A     DR. TONI GARCIA    COLONOSCOPY N/A 2023    STOOL IN ENTIRE COLON INTERFERING WITH VISUALIZATION, RESCOPE IN 2 YRS, DR. CHASTITY ACEVEDO AT [facility]    CYSTOSCOPY N/A 2017    DR. STANLEY MILLER    ENDOMETRIAL ABLATION N/A     ENDOSCOPY N/A 2009    CHRONIC ACTIVE GASTRITIS, DR. GLORY PACHECO AT [facility]    PUBOVAGINAL SLING  N/A 2021    Procedure: PUBO VAGINAL SLING INSERTION OF VAGINAL GRAFT CYSTORETHROSCOPY, AND REMOVAL OF PERIANAL SKIN TAGS;  Surgeon: Tiffanie Hebert MD;  Location: [facility] OR;  Service: Gynecology;  Laterality: N/A;    TUBAL ABDOMINAL LIGATION Bilateral 2002    VAGINAL DELIVERY N/A 2002    DR. KOSTA KATZ AT [place]    VAGINAL DELIVERY N/A 1999       Social History     Socioeconomic History    Marital status:    Tobacco Use    Smoking status: Never     Passive exposure: Never    Smokeless tobacco: Never   Vaping Use    Vaping status: Never Used   Substance and Sexual Activity    Alcohol use: No    Drug use: No    Sexual activity: Yes     Partners: Male     Birth control/protection: Surgical     Comment: tubal. .       Family History   Problem Relation Age of Onset    Heart disease Mother     Heart attack Mother          age 69    Stroke Mother     Hypertension Mother     Diabetes Mother     Heart disease Father     Heart attack Father          age 71    Hypertension Father     Schizophrenia Brother     Mental illness Brother     Breast cancer Neg Hx     Ovarian cancer Neg Hx     Uterine cancer Neg Hx     Colon cancer Neg Hx     Malig Hyperthermia Neg Hx        Review of Systems   Constitutional: Negative for diaphoresis and malaise/fatigue.   Cardiovascular:  Negative for chest pain, claudication, dyspnea on exertion, irregular heartbeat, leg swelling, near-syncope, orthopnea, palpitations, paroxysmal nocturnal dyspnea and syncope.   Respiratory:  Negative for cough, shortness of breath and sleep disturbances due to breathing.    Musculoskeletal:  Negative for falls.   Neurological:  Negative for dizziness and weakness.   Psychiatric/Behavioral:  Negative for altered mental status and substance abuse.        Allergies   Allergen Reactions    Bactrim [Sulfamethoxazole-Trimethoprim] Diarrhea     patient states she does not want to ever take Bactrim again         Current  "Outpatient Medications:     aspirin 81 MG EC tablet, Take 1 tablet by mouth Daily. INSTRUCTED PT TO FOLLOW MD INSTRUCTIONS REGARDING HOLDING FOR SURGERY, Disp: , Rfl:     B Complex-C-E-Zn (B COMPLEX-C-E-ZINC) tablet, Take 1 tablet by mouth Daily. HOLD PRIOR TO SURG, Disp: , Rfl:     Calcium Polycarbophil (FIBER-CAPS PO), Take  by mouth., Disp: , Rfl:     cephalexin (KEFLEX) 500 MG capsule, Take 1 capsule by mouth Daily As Needed (after intercourse)., Disp: 30 capsule, Rfl: 3    cetirizine (ZyrTEC) 10 MG tablet, Take 1 tablet by mouth Daily., Disp: , Rfl:     cholecalciferol (VITAMIN D3) 1000 UNITS tablet, Take 1 tablet by mouth Daily., Disp: , Rfl:     losartan (COZAAR) 50 MG tablet, Take 1 tablet by mouth Daily., Disp: 90 tablet, Rfl: 3    multivitamin (DAILY IRENE) tablet tablet, Take 1 tablet by mouth Daily. HOLD PRIOR TO SURG, Disp: , Rfl:       Objective:     Vitals:    05/28/24 1206   BP: 124/82   BP Location: Right arm   Patient Position: Sitting   Cuff Size: Adult   Pulse: 62   Weight: 65.7 kg (144 lb 12.8 oz)   Height: 167.6 cm (66\")     Body mass index is 23.37 kg/m².    PHYSICAL EXAM:    Constitutional:       General: Not in acute distress.     Appearance: Normal appearance. Well-developed.   Eyes:      Pupils: Pupils are equal, round, and reactive to light.   HENT:      Head: Normocephalic.   Neck:      Vascular: No carotid bruit or JVD.   Pulmonary:      Effort: Pulmonary effort is normal. No tachypnea.      Breath sounds: Normal breath sounds. No wheezing. No rales.   Cardiovascular:      Normal rate. Regular rhythm.      No gallop.    Pulses:     Intact distal pulses.   Edema:     Peripheral edema absent.   Abdominal:      General: Bowel sounds are normal.      Palpations: Abdomen is soft.      Tenderness: There is no abdominal tenderness.   Musculoskeletal: Normal range of motion.      Cervical back: Normal range of motion and neck supple. No edema. Skin:     General: Skin is warm and dry. "   Neurological:      Mental Status: Alert and oriented to person, place, and time.           ECG 12 Lead    Date/Time: 5/28/2024 12:54 PM  Performed by: Kavita Almanza APRN    Authorized by: Kavita Almanza APRN  Comparison: compared with previous ECG from 5/25/2023  Similar to previous ECG  Rhythm: sinus rhythm  Rate: normal  QRS axis: normal    Clinical impression: normal ECG            Assessment:       Diagnosis Plan   1. Primary hypertension     Blood pressure at goal   2. Pure hypercholesterolemia     Encourage increase physical activity.  Will see what the lipid results show.  Elevated LDL most likely familial.     Orders Placed This Encounter   Procedures    ECG 12 Lead     This order was created via procedure documentation     Order Specific Question:   Release to patient     Answer:   Routine Release [[id number]]          Plan:       Follow-up in 1 year.         Your medication list            Accurate as of May 28, 2024 12:55 PM. If you have any questions, ask your nurse or doctor.                CONTINUE taking these medications        Instructions Last Dose Given Next Dose Due   aspirin 81 MG EC tablet      Take 1 tablet by mouth Daily. INSTRUCTED PT TO FOLLOW MD INSTRUCTIONS REGARDING HOLDING FOR SURGERY       b complex-C-E-zinc tablet      Take 1 tablet by mouth Daily. HOLD PRIOR TO SURG       cephalexin 500 MG capsule  Commonly known as: KEFLEX      Take 1 capsule by mouth Daily As Needed (after intercourse).       cetirizine 10 MG tablet  Commonly known as: zyrTEC      Take 1 tablet by mouth Daily.       cholecalciferol 25 MCG (1000 UT) tablet  Commonly known as: VITAMIN D3      Take 1 tablet by mouth Daily.       FIBER-CAPS PO      Take  by mouth.       losartan 50 MG tablet  Commonly known as: COZAAR      Take 1 tablet by mouth Daily.       multivitamin tablet tablet  Generic drug: multivitamin      Take 1 tablet by mouth Daily. HOLD PRIOR TO SURG                  As always, it has been a  pleasure to participate in your patient's care.      Sincerely,     Kavita FIGUEROA   118.6

## 2024-07-22 ENCOUNTER — TELEPHONE (OUTPATIENT)
Dept: FAMILY MEDICINE CLINIC | Facility: CLINIC | Age: 54
End: 2024-07-22
Payer: COMMERCIAL

## 2024-07-22 ENCOUNTER — HOSPITAL ENCOUNTER (OUTPATIENT)
Dept: ULTRASOUND IMAGING | Facility: HOSPITAL | Age: 54
Discharge: HOME | End: 2024-07-22
Admitting: FAMILY MEDICINE
Payer: COMMERCIAL

## 2024-07-22 DIAGNOSIS — R92.8 ABNORMAL MAMMOGRAM OF RIGHT BREAST: ICD-10-CM

## 2024-07-22 PROCEDURE — 76642 ULTRASOUND BREAST LIMITED: CPT

## 2024-07-22 NOTE — TELEPHONE ENCOUNTER
Ok to relay    Attempted to call the patient left vm to call back about mammo results    Please let the patient know the radiologist read her ultrasound as probably benign.  I have never had them stated this way.  It is always been benign or needs further evaluation.  There is a complex cyst that has remained significantly unchanged from 6 months ago.  The radiologist is recommending follow-up bilateral mammogram and targeted right breast ultrasound in December 2024.  Have you had this area looked at by a breast surgeon yet?  I think that might be the better next step.  Sometimes they can do a needle biopsy.  Please let me know if you would rather me put in the referral to the breast surgeon or the mammogram and additional ultrasound in December and I will put those orders in.

## 2024-07-26 DIAGNOSIS — R92.8 ABNORMAL MAMMOGRAM OF RIGHT BREAST: Primary | ICD-10-CM

## 2024-08-14 ENCOUNTER — OFFICE VISIT (OUTPATIENT)
Dept: SURGERY | Facility: CLINIC | Age: 54
End: 2024-08-14
Payer: COMMERCIAL

## 2024-08-14 VITALS
WEIGHT: 140.2 LBS | DIASTOLIC BLOOD PRESSURE: 88 MMHG | SYSTOLIC BLOOD PRESSURE: 128 MMHG | HEIGHT: 66 IN | BODY MASS INDEX: 22.53 KG/M2

## 2024-08-14 DIAGNOSIS — N60.09 CYST OF BREAST, UNSPECIFIED LATERALITY: Primary | ICD-10-CM

## 2024-08-14 PROCEDURE — 99203 OFFICE O/P NEW LOW 30 MIN: CPT | Performed by: STUDENT IN AN ORGANIZED HEALTH CARE EDUCATION/TRAINING PROGRAM

## 2024-08-14 NOTE — PROGRESS NOTES
GENERAL SURGERY BENIGN BREAST HISTORY AND PHYSICAL     SUMMARY:  Agnes Hayes is a 54 y.o. lady with:  A new diagnosis of right breast complex cyst.  -Discussed BI-RADS scoring and reviewed her imaging results.  No indication for intervention at this time.  Recommend proceeding with bilateral screening mammogram and right breast ultrasound in December as previously recommended.  Will follow-up results.    -She can follow-up with me as needed.    High risk screening:   -Iraida Tran lifetime breast cancer risk: 8%. This patient does not qualify for high risk screening.    Referring Provider: Shanna Zamudio MD    Chief complaint: abnormal breast imaging    HPI: Ms. Agnes Hayes is seen at the request of Shanna Zamudio MD. The patient is a 54 y.o. woman being seen for a new diagnosis of a right breast complex cyst.      This was initially detected as an imaging abnormality on routine screening. Her work-up is detailed in the breast history section below. She has  had regular annual mammograms. She denies any prior history of abnormal mammograms or breast biopsies. She denies any  breast lumps, pain, skin changes, or nipple discharge.    She denies any family history of breast or ovarian cancer     TIMELINE OF WORKUP:  12/18/2023 Bilateral Screening Mammogram:   IMPRESSION:  1. There is an area of focal asymmetry in the middle third of the right breast at the 12 o'clock position. Further evaluation with spot compression CC and MLO mammographic and tomosynthesis images and targeted right breast sonography is recommended.  2. There are no findings suspicious for malignancy in the left breast.   BI-RADS Category 0: Incomplete    1/18/2023 Right Breast Diagnostic Mammo/US:   FINDINGS: The patient returned for diagnostic evaluation with coned compression views in both projections and R2 and digital tomosynthesis and limited directed right breast ultrasound. The area of concern in the right breast becomes less apparent with  coned compression and digital tomosynthesis. Limited directed right breast ultrasound does demonstrate a small complex cyst located at 12 o'clock 5 cm from the nipple and having several septations. It measures 8 x 6 x 6 mm. There is no vascularity or posterior shadowing.  CONCLUSION: Negative right sided mammogram and probable benign directed right breast ultrasound showing a complex septated cyst. Short-term followup directed right breast ultrasound in 6 months is recommended.    BI-RADS 3. Probable benign findings.    2024 Right Breast US:   Targeted sonographic evaluation of the right breast was performed for follow-up. At 12:00, 5 cm from the nipple, there is a 0.7 x 0.4 x 0.8 cm septated cyst, which is not significantly changed from 2024, previously 0.8 x 0.6 x 0.6 cm. This remains probably benign. Recommend follow-up bilateral mammogram and targeted right breast ultrasound in 2024 to document 1 year stability.    BI-RADS Category 3: Probably benign    MEDICAL HISTORY:   Gynecologic History:   . P:2 AB:0  Age at first childbirth: 29  Lactation/How long: tried  Age at menarche: 14  Age at menopause: perimenopausal  Total years of oral contraceptive use: previously   Total years of hormone replacement therapy: none    Past Medical History:   HTN    Past Surgical History:    Past Surgical History:   Procedure Laterality Date    BUNIONECTOMY Right     BUNIONECTOMY Left     DR. BONITA RICHARDSON    CHOLECYSTECTOMY N/A     DR. TONI GARCIA    COLONOSCOPY N/A 2023    STOOL IN ENTIRE COLON INTERFERING WITH VISUALIZATION, RESCOPE IN 2 YRS, DR. CHASTITY ACEVEDO AT [facility]    CYSTOSCOPY N/A 2017    DR. STANLEY MILLER    ENDOMETRIAL ABLATION N/A     ENDOSCOPY N/A 2009    CHRONIC ACTIVE GASTRITIS, DR. GLORY PACHECO AT [facility]    PUBOVAGINAL SLING N/A 2021    Procedure: PUBO VAGINAL SLING INSERTION OF VAGINAL GRAFT CYSTORETHROSCOPY, AND REMOVAL OF PERIANAL SKIN TAGS;  Surgeon: Anup  Tiffanie MIMS MD;  Location: [facility] OR;  Service: Gynecology;  Laterality: N/A;    TUBAL ABDOMINAL LIGATION Bilateral 2002    VAGINAL DELIVERY N/A 04/16/2002    DR. KOSTA KATZ AT [place]    VAGINAL DELIVERY N/A 06/1999     Family History:    As above    Social History:   Denies tobacco use  Denies alcohol use    Allergies:   Allergies   Allergen Reactions    Bactrim [Sulfamethoxazole-Trimethoprim] Diarrhea     patient states she does not want to ever take Bactrim again       Medications:     Current Outpatient Medications:     aspirin 81 MG EC tablet, Take 1 tablet by mouth Daily. INSTRUCTED PT TO FOLLOW MD INSTRUCTIONS REGARDING HOLDING FOR SURGERY, Disp: , Rfl:     B Complex-C-E-Zn (B COMPLEX-C-E-ZINC) tablet, Take 1 tablet by mouth Daily. HOLD PRIOR TO SURG, Disp: , Rfl:     Calcium Polycarbophil (FIBER-CAPS PO), Take  by mouth., Disp: , Rfl:     cephalexin (KEFLEX) 500 MG capsule, Take 1 capsule by mouth Daily As Needed (after intercourse)., Disp: 30 capsule, Rfl: 3    cetirizine (ZyrTEC) 10 MG tablet, Take 1 tablet by mouth Daily., Disp: , Rfl:     cholecalciferol (VITAMIN D3) 1000 UNITS tablet, Take 1 tablet by mouth Daily., Disp: , Rfl:     losartan (COZAAR) 50 MG tablet, Take 1 tablet by mouth Daily., Disp: 90 tablet, Rfl: 3    multivitamin (DAILY IRENE) tablet tablet, Take 1 tablet by mouth Daily. HOLD PRIOR TO SURG, Disp: , Rfl:     Labs:    Labs from 11/15/2023 personally reviewed by me     ROS:   Influenza-like illness: no fever, no  cough, no  sore throat, no  body aches, no loss of sense of taste or smell, no known exposure to person with Covid-19.  Constitutional: Negative for fevers or chills  HENT: Negative for hearing loss or runny nose  Eyes: Negative for vision changes or scleral icterus  Respiratory: Negative for cough or shortness of breath  Cardiovascular: Negative for chest pain or heart palpitations  Gastrointestinal: Negative for abdominal pain, nausea, vomiting, constipation, melena, or  hematochezia  Genitourinary: Negative for hematuria or dysuria  Musculoskeletal: Negative for joint swelling or gait instability  Neurologic: Negative for tremors or seizures  Psychiatric: Negative for suicidal ideations or depression  All other systems reviewed and negative    PHYSICAL EXAM:   Constitutional: Well-developed well-nourished, no acute distress  Eyes: Conjunctiva normal, sclera nonicteric  ENMT: Hearing grossly normal, oral mucosa moist  Neck: Supple, no palpable mass, trachea midline  Respiratory: Clear to auscultation, normal inspiratory effort  Cardiovascular: Regular rate, no murmur, no peripheral edema, no jugular venous distention  Breast: symmetric  Right: No visible abnormalities on inspection while seated, with arms raised or hands on hips. No masses, skin changes, or nipple abnormalities.  Left:  No visible abnormalities on inspection while seated, with arms raised or hands on hips. No masses, skin changes, or nipple abnormalities.  No clinical chest wall involvement.  Gastrointestinal: Soft, nontender  Lymphatics (palpable nodes): No cervical, supraclavicular or axillary lymphadenopathy  Skin:  Warm, dry, no rash on visualized skin surfaces  Musculoskeletal: Symmetric strength, normal gait  Psychiatric: Alert and oriented ×3, normal affect     BMI is within normal parameters. No other follow-up for BMI required.       SIGRID RAUSCH M.D.  General and Endoscopic Surgery  [facility]    [address], [address]  [place], [place], [address]  P: [phone]  F: [phone]

## 2024-09-18 ENCOUNTER — HOSPITAL ENCOUNTER (EMERGENCY)
Facility: HOSPITAL | Age: 54
Discharge: HOME | End: 2024-09-18
Attending: EMERGENCY MEDICINE | Admitting: EMERGENCY MEDICINE
Payer: COMMERCIAL

## 2024-09-18 VITALS
HEIGHT: 66 IN | HEART RATE: 79 BPM | RESPIRATION RATE: 18 BRPM | WEIGHT: 135 LBS | DIASTOLIC BLOOD PRESSURE: 89 MMHG | TEMPERATURE: 98.8 F | BODY MASS INDEX: 21.69 KG/M2 | SYSTOLIC BLOOD PRESSURE: 134 MMHG | OXYGEN SATURATION: 100 %

## 2024-09-18 DIAGNOSIS — R09.1 PLEURISY: Primary | ICD-10-CM

## 2024-09-18 LAB
FLUAV SUBTYP SPEC NAA+PROBE: NOT DETECTED
FLUBV RNA ISLT QL NAA+PROBE: NOT DETECTED
SARS-COV-2 RNA RESP QL NAA+PROBE: NOT DETECTED

## 2024-09-18 PROCEDURE — 96372 THER/PROPH/DIAG INJ SC/IM: CPT

## 2024-09-18 PROCEDURE — 25010000002 KETOROLAC TROMETHAMINE PER 15 MG: Performed by: EMERGENCY MEDICINE

## 2024-09-18 PROCEDURE — 87636 SARSCOV2 & INF A&B AMP PRB: CPT | Performed by: EMERGENCY MEDICINE

## 2024-09-18 PROCEDURE — 99283 EMERGENCY DEPT VISIT LOW MDM: CPT

## 2024-09-18 PROCEDURE — 63710000001 PREDNISONE PER 1 MG: Performed by: EMERGENCY MEDICINE

## 2024-09-18 PROCEDURE — 99283 EMERGENCY DEPT VISIT LOW MDM: CPT | Performed by: EMERGENCY MEDICINE

## 2024-09-18 RX ORDER — PREDNISONE 20 MG/1
40 TABLET ORAL ONCE
Status: COMPLETED | OUTPATIENT
Start: 2024-09-18 | End: 2024-09-18

## 2024-09-18 RX ORDER — KETOROLAC TROMETHAMINE 10 MG/1
10 TABLET, FILM COATED ORAL EVERY 6 HOURS PRN
Qty: 12 TABLET | Refills: 0 | Status: SHIPPED | OUTPATIENT
Start: 2024-09-18

## 2024-09-18 RX ORDER — PREDNISONE 10 MG/1
TABLET ORAL
Qty: 20 TABLET | Refills: 0 | Status: SHIPPED | OUTPATIENT
Start: 2024-09-18

## 2024-09-18 RX ORDER — KETOROLAC TROMETHAMINE 30 MG/ML
30 INJECTION, SOLUTION INTRAMUSCULAR; INTRAVENOUS ONCE
Status: COMPLETED | OUTPATIENT
Start: 2024-09-18 | End: 2024-09-18

## 2024-09-18 RX ORDER — BENZONATATE 100 MG/1
100 CAPSULE ORAL 3 TIMES DAILY PRN
Qty: 21 CAPSULE | Refills: 0 | Status: SHIPPED | OUTPATIENT
Start: 2024-09-18 | End: 2024-09-25

## 2024-09-18 RX ORDER — METHOCARBAMOL 500 MG/1
750 TABLET, FILM COATED ORAL ONCE
Status: COMPLETED | OUTPATIENT
Start: 2024-09-18 | End: 2024-09-18

## 2024-09-18 RX ORDER — METHOCARBAMOL 750 MG/1
750 TABLET, FILM COATED ORAL 3 TIMES DAILY PRN
Qty: 30 TABLET | Refills: 0 | Status: SHIPPED | OUTPATIENT
Start: 2024-09-18

## 2024-09-18 RX ADMIN — METHOCARBAMOL TABLETS 750 MG: 500 TABLET, COATED ORAL at 07:51

## 2024-09-18 RX ADMIN — KETOROLAC TROMETHAMINE 30 MG: 30 INJECTION, SOLUTION INTRAMUSCULAR at 07:48

## 2024-09-18 RX ADMIN — PREDNISONE 40 MG: 20 TABLET ORAL at 07:49

## 2024-10-17 ENCOUNTER — OFFICE VISIT (OUTPATIENT)
Dept: OBSTETRICS AND GYNECOLOGY | Age: 54
End: 2024-10-17
Payer: COMMERCIAL

## 2024-10-17 VITALS
DIASTOLIC BLOOD PRESSURE: 82 MMHG | WEIGHT: 142.6 LBS | BODY MASS INDEX: 22.92 KG/M2 | HEIGHT: 66 IN | SYSTOLIC BLOOD PRESSURE: 128 MMHG

## 2024-10-17 DIAGNOSIS — N95.1 PERIMENOPAUSE: Primary | ICD-10-CM

## 2024-10-17 DIAGNOSIS — Z01.419 WELL WOMAN EXAM WITH ROUTINE GYNECOLOGICAL EXAM: ICD-10-CM

## 2024-10-17 NOTE — PROGRESS NOTES
Routine Annual Visit    10/17/2024    Patient: Agnes Hayes          MR#:[id number]      Chief Complaint   Patient presents with    Gynecologic Exam     AE Today, Last AE 2023 (-) HPV (-), MG 2024, Colonoscopy 3/20/2023       History of Present Illness    54 y.o. female  who presents for annual exam.   Last menses was in July this year, they were on vacation. She doesn't have any significant hot flashes, night sweats. No irritability, brain fog etc with menopause.  Had complex breast cyst this year and met with surgery, plan to observe. Due for more imaging in December and has this scheduled  Still having some stress incontinence despite sling    Patient's last menstrual period was 2024 (approximate).  Obstetric History:  OB History          2    Para   2    Term   2       0    AB   0    Living   2         SAB        IAB        Ectopic        Molar        Multiple        Live Births   2               Menstrual History:     Patient's last menstrual period was 2024 (approximate).       ________________________________________  Patient Active Problem List   Diagnosis    Hypertension    Hyperlipidemia    Slow transit constipation    Family history of heart disease    Recurrent UTI    Incontinence in female    Hemorrhoids    Family history of diabetes mellitus    Screening for colorectal cancer    Female stress incontinence    Healthcare maintenance    Vitamin D deficiency       Past Medical History:   Diagnosis Date    Abnormal mammogram 2018    Bilateral chronic knee pain     Breast asymmetry     Constipation     Elevated glucose 2019    Frequent UTI     Gestational diabetes mellitus     WITH BOTH PREGNANCIES    Healthcare maintenance 10/07/2022    Hemorrhoids     Hyperlipidemia     Hypertension     MITCH (stress urinary incontinence, female)        Family History   Problem Relation Age of Onset    Heart disease Mother     Heart attack Mother          age 69    Stroke  "Mother     Hypertension Mother     Diabetes Mother     Heart disease Father     Heart attack Father          age 71    Hypertension Father     Schizophrenia Brother     Mental illness Brother     Breast cancer Neg Hx     Ovarian cancer Neg Hx     Uterine cancer Neg Hx     Colon cancer Neg Hx     Malig Hyperthermia Neg Hx        Past Surgical History:   Procedure Laterality Date    BUNIONECTOMY Right     BUNIONECTOMY Left     DR. BONITA RICHARDSON    CHOLECYSTECTOMY N/A     DR. TONI GARCIA    COLONOSCOPY N/A 2023    STOOL IN ENTIRE COLON INTERFERING WITH VISUALIZATION, RESCOPE IN 2 YRS, DR. CHASTITY ACEVEDO AT [facility]    CYSTOSCOPY N/A 2017    DR. STANLEY MILLER    ENDOMETRIAL ABLATION N/A     ENDOSCOPY N/A 2009    CHRONIC ACTIVE GASTRITIS, DR. GLORY PACHECO AT [facility]    PUBOVAGINAL SLING N/A 2021    Procedure: PUBO VAGINAL SLING INSERTION OF VAGINAL GRAFT CYSTORETHROSCOPY, AND REMOVAL OF PERIANAL SKIN TAGS;  Surgeon: Tiffanie Hebret MD;  Location: [facility];  Service: Gynecology;  Laterality: N/A;    TUBAL ABDOMINAL LIGATION Bilateral     VAGINAL DELIVERY N/A 2002    DR. KOSTA KATZ AT [place]    VAGINAL DELIVERY N/A 1999       Social History     Tobacco Use   Smoking Status Never    Passive exposure: Never   Smokeless Tobacco Never       has a current medication list which includes the following prescription(s): aspirin, b complex-c-e-zinc, calcium polycarbophil, cephalexin, cetirizine, cholecalciferol, losartan, multivitamin, ketorolac, ketorolac, and [DISCONTINUED] pravastatin.  ________________________________________      Objective   Physical Exam    /82   Ht 167.6 cm (66\")   Wt 64.7 kg (142 lb 9.6 oz)   LMP 2024 (Approximate)   BMI 23.02 kg/m²    BP Readings from Last 3 Encounters:   10/17/24 128/82   24 134/89   24 128/88      Wt Readings from Last 3 Encounters:   10/17/24 64.7 kg (142 lb 9.6 oz)   24 61.2 kg (135 lb)   24 63.6 " kg (140 lb 3.2 oz)         BMI: Body mass index is 23.02 kg/m².       General:   alert, appears stated age, and cooperative   Neck: No thyromegaly or LAD   Abdomen: soft, non-tender, without masses or organomegaly   Breast: inspection negative, no nipple discharge or bleeding, no masses or nodularity palpable   Urethra and bladder: urethral meatus normal; bladder nontender to palpation;   Vulva: normal, Bartholin's, Urethra, [place]'s normal   Vagina: normal mucosa, normal discharge   Cervix: multiparous appearance and no lesions   Uterus: normal size, non-tender, and anteverted   Adnexa: normal adnexa and no mass, fullness, tenderness       Assessment:    normal annual exam   Perimenopause  Breast cyst    Plan:    Plan     []  Mammogram request made- already has follow up imaging in december  []  PAP done- UTD  []  Labs:   []  GC/Chl/TV  []  DEXA scan   []  Referral for colonoscopy:     Due for repeat c scope this spring, had inadequate prep  Discussed menopause and HRT today, not interested in starting. She is doing well with menopause transition    Counseling  [x]  Nutrition  [x]  Physical activity/regular exercise   [x]  Healthy weight  []  Injury prevention  []  Smoking cessation  []  Substance misuse/abuse  [x]  Sexual behavior  []  STD prevention  []  Contraception  []  Dental health  []  Mental health  []  Immunization  [x]  Encouraged SBE        Karen Arredondo MD  10/17/2024  14:15 EDT

## 2024-11-15 ENCOUNTER — OFFICE VISIT (OUTPATIENT)
Dept: FAMILY MEDICINE CLINIC | Facility: CLINIC | Age: 54
End: 2024-11-15
Payer: COMMERCIAL

## 2024-11-15 VITALS
OXYGEN SATURATION: 97 % | SYSTOLIC BLOOD PRESSURE: 128 MMHG | WEIGHT: 140.2 LBS | HEIGHT: 66 IN | BODY MASS INDEX: 22.53 KG/M2 | TEMPERATURE: 97.3 F | RESPIRATION RATE: 18 BRPM | HEART RATE: 76 BPM | DIASTOLIC BLOOD PRESSURE: 80 MMHG

## 2024-11-15 DIAGNOSIS — Z12.11 SCREENING FOR COLORECTAL CANCER: ICD-10-CM

## 2024-11-15 DIAGNOSIS — I10 HYPERTENSION, BENIGN: ICD-10-CM

## 2024-11-15 DIAGNOSIS — Z13.6 SCREENING FOR CARDIOVASCULAR CONDITION: ICD-10-CM

## 2024-11-15 DIAGNOSIS — Z00.00 HEALTHCARE MAINTENANCE: Primary | ICD-10-CM

## 2024-11-15 DIAGNOSIS — Z23 IMMUNIZATION DUE: ICD-10-CM

## 2024-11-15 DIAGNOSIS — Z12.12 SCREENING FOR COLORECTAL CANCER: ICD-10-CM

## 2024-11-15 RX ORDER — LOSARTAN POTASSIUM 50 MG/1
50 TABLET ORAL DAILY
Qty: 90 TABLET | Refills: 3 | Status: SHIPPED | OUTPATIENT
Start: 2024-11-15

## 2024-11-15 NOTE — PROGRESS NOTES
"Agnes Hayes is here today for an annual physical exam.     Eating a healthy diet. Exercising routinely.   Lmp was August. Wears seat belt. Feels safe at home.   Sexual activity: yes  Birth control:btl  Pregnancy:      PHQ-2 Depression Screening  Little interest or pleasure in doing things? Not at all   Feeling down, depressed, or hopeless? Not at all   PHQ-2 Total Score 0         I have reviewed the patient's medical, family, and social history in detail and updated the computerized patient record.    Screening history:  Colonoscopy - due  Mammogram- scheduled with gyn, Pap/pelvic - utd  Metabolic - due    Health Maintenance   Topic Date Due    COVID-19 Vaccine (2024- season) 2024    ANNUAL PHYSICAL  11/15/2024    LIPID PANEL  11/15/2024    COLORECTAL CANCER SCREENING  2025    MAMMOGRAM  2026    PAP SMEAR  2026    TDAP/TD VACCINES (2 - Td or Tdap) 2027    HEPATITIS C SCREENING  Completed    INFLUENZA VACCINE  Completed    ZOSTER VACCINE  Completed    Pneumococcal Vaccine 0-64  Aged Out       Review of Systems   Constitutional:  Negative for fever.   HENT:  Negative for hearing loss.    Eyes:  Negative for visual disturbance.   Respiratory:  Negative for shortness of breath.    Cardiovascular:  Negative for chest pain.   Gastrointestinal:  Negative for constipation and diarrhea.   Genitourinary:  Negative for difficulty urinating.   Musculoskeletal:  Negative for arthralgias and myalgias.   Skin:  Negative for rash.   Hematological:  Does not bruise/bleed easily.   Psychiatric/Behavioral:  Negative for dysphoric mood.        /80 (BP Location: Left arm, Patient Position: Sitting, Cuff Size: Small Adult)   Pulse 76   Temp 97.3 °F (36.3 °C) (Temporal)   Resp 18   Ht 167.6 cm (65.98\")   Wt 63.6 kg (140 lb 3.2 oz)   SpO2 97%   BMI 22.64 kg/m²      Physical Exam    Vital signs reviewed.  General appearance: No acute distress  Eyes: conjunctiva clear without erythema; " pupils equally round and reactive  ENT: external ears and nose normal; hearing normal, oropharynx clear  Neck: supple; no thyromegaly  CV: normal rate and rhythm; no peripheral edema  Respiratory: normal respiratory effort; lungs clear to auscultation bilaterally  MSK: normal gait and station; no focal joint deformity or swelling  Skin: no rash or wounds; normal turgor  Neuro: cranial nerves 2-12 grossly intact; normal sensation to light touch  Psych: mood and affect normal; recent and remote memory intact    No visits with results within 2 Week(s) from this visit.   Latest known visit with results is:   Admission on 09/18/2024, Discharged on 09/18/2024   Component Date Value Ref Range Status    COVID19 09/18/2024 Not Detected  Not Detected - Ref. Range Final    Influenza A PCR 09/18/2024 Not Detected  Not Detected Final    Influenza B PCR 09/18/2024 Not Detected  Not Detected Final         Current Outpatient Medications:     aspirin 81 MG EC tablet, Take 1 tablet by mouth Daily., Disp: , Rfl:     B Complex-C-E-Zn (B COMPLEX-C-E-ZINC) tablet, Take 1 tablet by mouth Daily., Disp: , Rfl:     Calcium Polycarbophil (FIBER-CAPS PO), Take  by mouth., Disp: , Rfl:     cetirizine (ZyrTEC) 10 MG tablet, Take 1 tablet by mouth Daily., Disp: , Rfl:     cholecalciferol (VITAMIN D3) 1000 UNITS tablet, Take 1 tablet by mouth Daily., Disp: , Rfl:     losartan (COZAAR) 50 MG tablet, Take 1 tablet by mouth Daily., Disp: 90 tablet, Rfl: 3    multivitamin (DAILY IRENE) tablet tablet, Take 1 tablet by mouth Daily., Disp: , Rfl:     Diagnoses and all orders for this visit:    1. Healthcare maintenance (Primary)    2. Hypertension, benign  -     losartan (COZAAR) 50 MG tablet; Take 1 tablet by mouth Daily.  Dispense: 90 tablet; Refill: 3    3. Screening for colorectal cancer  -     Ambulatory Referral For Screening Colonoscopy    4. Immunization due  -     COVID-19 (Pfizer) 12yrs+ (COMIRNATY)    5. Screening for cardiovascular  condition  -     Comprehensive Metabolic Panel  -     Lipid Panel        Encourage healthy diet and exercise.  Encourage patient to stay up to date on screening examinations as indicated based on age and risk factors. F/U yearly.

## 2024-11-16 LAB
ALBUMIN SERPL-MCNC: 4.8 G/DL (ref 3.5–5.2)
ALBUMIN/GLOB SERPL: 1.9 G/DL
ALP SERPL-CCNC: 96 U/L (ref 39–117)
ALT SERPL-CCNC: 19 U/L (ref 1–33)
AST SERPL-CCNC: 24 U/L (ref 1–32)
BILIRUB SERPL-MCNC: 0.3 MG/DL (ref 0–1.2)
BUN SERPL-MCNC: 10 MG/DL (ref 6–20)
BUN/CREAT SERPL: 13.5 (ref 7–25)
CALCIUM SERPL-MCNC: 9.8 MG/DL (ref 8.6–10.5)
CHLORIDE SERPL-SCNC: 96 MMOL/L (ref 98–107)
CHOLEST SERPL-MCNC: 249 MG/DL (ref 0–200)
CO2 SERPL-SCNC: 31.5 MMOL/L (ref 22–29)
CREAT SERPL-MCNC: 0.74 MG/DL (ref 0.57–1)
EGFRCR SERPLBLD CKD-EPI 2021: 97.5 ML/MIN/1.73
GLOBULIN SER CALC-MCNC: 2.5 GM/DL
GLUCOSE SERPL-MCNC: 92 MG/DL (ref 65–99)
HDLC SERPL-MCNC: 84 MG/DL (ref 40–60)
LDLC SERPL CALC-MCNC: 155 MG/DL (ref 0–100)
POTASSIUM SERPL-SCNC: 4.6 MMOL/L (ref 3.5–5.2)
PROT SERPL-MCNC: 7.3 G/DL (ref 6–8.5)
SODIUM SERPL-SCNC: 136 MMOL/L (ref 136–145)
TRIGL SERPL-MCNC: 63 MG/DL (ref 0–150)
VLDLC SERPL CALC-MCNC: 10 MG/DL (ref 5–40)

## 2024-11-18 ENCOUNTER — TELEPHONE (OUTPATIENT)
Dept: FAMILY MEDICINE CLINIC | Facility: CLINIC | Age: 54
End: 2024-11-18
Payer: COMMERCIAL

## 2024-11-18 ENCOUNTER — TELEPHONE (OUTPATIENT)
Age: 54
End: 2024-11-18
Payer: COMMERCIAL

## 2024-11-18 DIAGNOSIS — E78.5 HYPERLIPIDEMIA LDL GOAL <70: Primary | ICD-10-CM

## 2024-11-18 NOTE — TELEPHONE ENCOUNTER
Yes, can you ask her to get some labs drawn? Lp(a) and apoB? We are drawing them now to help understand the need for medication. Her LDL has gone up this year about 30 pts which makes me more inclined towards meds but we can make more informed decisions with these additional  labs

## 2024-11-18 NOTE — TELEPHONE ENCOUNTER
----- Message from Shanna Zamudio sent at 11/18/2024  9:46 AM EST -----  No there is not really a specialist for this.  The cholesterol is either coming from her diet or genetics.  Her body can also make cholesterol.  Please let me know if she is willing to go on medication so I can call something in.  ----- Message -----  From: Remedios Morris MA  Sent: 11/18/2024   9:43 AM EST  To: Shanna Zamudio MD    Gave patient results and voiced understanding.    Pt wants to know if there is a specialist she can see about this and wants to know why its not getting better

## 2024-11-18 NOTE — TELEPHONE ENCOUNTER
Pt is asking if you could take over her Lipids and monitor her overall cholesterol?  She just had Lipids drawn 11/15/24, could you please review them, and give your opinion.  PCP is wanting her to start a statin, and pt is not sure about taking a med at this time.    Ca Score was 0.  ECHO was Normal.    Please advise.    Thank you,    CARRIE Pacheco

## 2024-11-19 ENCOUNTER — LAB (OUTPATIENT)
Dept: LAB | Facility: HOSPITAL | Age: 54
End: 2024-11-19
Payer: COMMERCIAL

## 2024-11-19 DIAGNOSIS — E78.5 HYPERLIPIDEMIA LDL GOAL <70: ICD-10-CM

## 2024-11-19 PROCEDURE — 83695 ASSAY OF LIPOPROTEIN(A): CPT

## 2024-11-19 PROCEDURE — 36415 COLL VENOUS BLD VENIPUNCTURE: CPT

## 2024-11-19 PROCEDURE — 82172 ASSAY OF APOLIPOPROTEIN: CPT

## 2024-11-20 LAB — LPA SERPL-SCNC: 205.3 NMOL/L

## 2024-11-22 LAB — APO B SERPL-MCNC: 95 MG/DL

## 2024-11-27 DIAGNOSIS — E78.5 HYPERLIPIDEMIA LDL GOAL <100: Primary | ICD-10-CM

## 2024-11-27 RX ORDER — ROSUVASTATIN CALCIUM 10 MG/1
10 TABLET, COATED ORAL DAILY
Qty: 90 TABLET | Refills: 3 | Status: SHIPPED | OUTPATIENT
Start: 2024-11-27

## 2024-12-02 ENCOUNTER — HOSPITAL ENCOUNTER (OUTPATIENT)
Dept: MAMMOGRAPHY | Facility: HOSPITAL | Age: 54
Discharge: HOME | End: 2024-12-02
Payer: COMMERCIAL

## 2024-12-02 ENCOUNTER — HOSPITAL ENCOUNTER (OUTPATIENT)
Dept: ULTRASOUND IMAGING | Facility: HOSPITAL | Age: 54
Discharge: HOME | End: 2024-12-02
Payer: COMMERCIAL

## 2024-12-02 DIAGNOSIS — N60.09 CYST OF BREAST, UNSPECIFIED LATERALITY: ICD-10-CM

## 2024-12-02 PROCEDURE — G0279 TOMOSYNTHESIS, MAMMO: HCPCS

## 2024-12-02 PROCEDURE — 77066 DX MAMMO INCL CAD BI: CPT

## 2024-12-02 PROCEDURE — 76642 ULTRASOUND BREAST LIMITED: CPT

## 2024-12-09 ENCOUNTER — TELEPHONE (OUTPATIENT)
Dept: SURGERY | Facility: CLINIC | Age: 54
End: 2024-12-09
Payer: COMMERCIAL

## 2024-12-09 NOTE — TELEPHONE ENCOUNTER
Called pt, left message relayed the message Dr. Onofre sent. Told pt to give us a call if she would like Dr. Onofre to order that MRI

## 2024-12-09 NOTE — TELEPHONE ENCOUNTER
----- Message from Tricia Luceromulugeta sent at 12/6/2024  4:20 PM EST -----  Regarding: mammo results  Can you let her know her mammogram and US were normal. They do mention she has dense breast tissue and qualifies for an MRI for additional screening if she would like to proceed. If she would like one, I can order. Otherwise she can return to regular yearly mammogram and follow up with me as needed.     CONCLUSION: Benign bilateral mammogram and benign directed right breast  ultrasound as described above. Continued annual mammography is  recommended. Supplemental screening breast ultrasound or MRI might be  considered due to dense breast tissue.   BI-RADS Category 2. Benign findings.    Thanks,   Tricia

## 2025-01-08 ENCOUNTER — TELEPHONE (OUTPATIENT)
Dept: SURGERY | Facility: CLINIC | Age: 55
End: 2025-01-08
Payer: COMMERCIAL

## 2025-01-08 ENCOUNTER — PREP FOR SURGERY (OUTPATIENT)
Dept: OTHER | Facility: HOSPITAL | Age: 55
End: 2025-01-08
Payer: COMMERCIAL

## 2025-01-08 DIAGNOSIS — Z12.11 SCREENING FOR COLON CANCER: Primary | ICD-10-CM

## 2025-01-08 NOTE — TELEPHONE ENCOUNTER
Message was left on patients voicemail to call Yisel at the office.  I was calling to schedule a colonoscopy.  Patient was given my office number to call.

## 2025-01-09 PROBLEM — Z12.11 SCREENING FOR COLON CANCER: Status: ACTIVE | Noted: 2025-01-08

## 2025-03-04 ENCOUNTER — PATIENT MESSAGE (OUTPATIENT)
Dept: OBSTETRICS AND GYNECOLOGY | Age: 55
End: 2025-03-04
Payer: COMMERCIAL

## 2025-03-04 RX ORDER — CEPHALEXIN 500 MG/1
500 CAPSULE ORAL DAILY PRN
Qty: 30 CAPSULE | Refills: 1 | Status: SHIPPED | OUTPATIENT
Start: 2025-03-04

## 2025-04-16 ENCOUNTER — ANESTHESIA (OUTPATIENT)
Dept: GASTROENTEROLOGY | Facility: HOSPITAL | Age: 55
End: 2025-04-16
Payer: COMMERCIAL

## 2025-04-16 ENCOUNTER — ANESTHESIA EVENT (OUTPATIENT)
Dept: GASTROENTEROLOGY | Facility: HOSPITAL | Age: 55
End: 2025-04-16
Payer: COMMERCIAL

## 2025-04-16 ENCOUNTER — HOSPITAL ENCOUNTER (OUTPATIENT)
Facility: HOSPITAL | Age: 55
Setting detail: HOSPITAL OUTPATIENT SURGERY
Discharge: HOME | End: 2025-04-16
Attending: COLON & RECTAL SURGERY | Admitting: COLON & RECTAL SURGERY
Payer: COMMERCIAL

## 2025-04-16 VITALS
RESPIRATION RATE: 16 BRPM | TEMPERATURE: 98.2 F | SYSTOLIC BLOOD PRESSURE: 124 MMHG | HEIGHT: 66 IN | HEART RATE: 67 BPM | WEIGHT: 133.4 LBS | OXYGEN SATURATION: 100 % | BODY MASS INDEX: 21.44 KG/M2 | DIASTOLIC BLOOD PRESSURE: 81 MMHG

## 2025-04-16 DIAGNOSIS — Z12.12 SCREENING FOR COLORECTAL CANCER: ICD-10-CM

## 2025-04-16 DIAGNOSIS — Z12.11 SCREENING FOR COLORECTAL CANCER: ICD-10-CM

## 2025-04-16 LAB
B-HCG UR QL: NEGATIVE
EXPIRATION DATE: NORMAL
INTERNAL NEGATIVE CONTROL: NEGATIVE
INTERNAL POSITIVE CONTROL: POSITIVE
Lab: NORMAL

## 2025-04-16 PROCEDURE — 88305 TISSUE EXAM BY PATHOLOGIST: CPT | Performed by: COLON & RECTAL SURGERY

## 2025-04-16 PROCEDURE — 25010000002 PROPOFOL 1000 MG/100ML EMULSION

## 2025-04-16 PROCEDURE — 25810000003 LACTATED RINGERS PER 1000 ML

## 2025-04-16 PROCEDURE — 25010000002 LIDOCAINE 2% SOLUTION

## 2025-04-16 PROCEDURE — 25810000003 LACTATED RINGERS PER 1000 ML: Performed by: COLON & RECTAL SURGERY

## 2025-04-16 PROCEDURE — 45385 COLONOSCOPY W/LESION REMOVAL: CPT | Performed by: COLON & RECTAL SURGERY

## 2025-04-16 PROCEDURE — 81025 URINE PREGNANCY TEST: CPT | Performed by: COLON & RECTAL SURGERY

## 2025-04-16 DEVICE — WORKING LENGTH 235CM, WORKING CHANNEL 2.8MM
Type: IMPLANTABLE DEVICE | Site: TRANSVERSE COLON | Status: FUNCTIONAL
Brand: RESOLUTION 360 CLIP

## 2025-04-16 RX ORDER — LIDOCAINE HYDROCHLORIDE 10 MG/ML
0.5 INJECTION, SOLUTION INFILTRATION; PERINEURAL ONCE AS NEEDED
Status: DISCONTINUED | OUTPATIENT
Start: 2025-04-16 | End: 2025-04-16 | Stop reason: HOSPADM

## 2025-04-16 RX ORDER — SODIUM CHLORIDE 0.9 % (FLUSH) 0.9 %
10 SYRINGE (ML) INJECTION AS NEEDED
Status: DISCONTINUED | OUTPATIENT
Start: 2025-04-16 | End: 2025-04-16 | Stop reason: HOSPADM

## 2025-04-16 RX ORDER — LIDOCAINE HYDROCHLORIDE 20 MG/ML
INJECTION, SOLUTION INFILTRATION; PERINEURAL AS NEEDED
Status: DISCONTINUED | OUTPATIENT
Start: 2025-04-16 | End: 2025-04-16 | Stop reason: SURG

## 2025-04-16 RX ORDER — PROPOFOL 10 MG/ML
INJECTION, EMULSION INTRAVENOUS CONTINUOUS PRN
Status: DISCONTINUED | OUTPATIENT
Start: 2025-04-16 | End: 2025-04-16 | Stop reason: SURG

## 2025-04-16 RX ORDER — SODIUM CHLORIDE, SODIUM LACTATE, POTASSIUM CHLORIDE, CALCIUM CHLORIDE 600; 310; 30; 20 MG/100ML; MG/100ML; MG/100ML; MG/100ML
1000 INJECTION, SOLUTION INTRAVENOUS CONTINUOUS
Status: DISCONTINUED | OUTPATIENT
Start: 2025-04-16 | End: 2025-04-16 | Stop reason: HOSPADM

## 2025-04-16 RX ORDER — SODIUM CHLORIDE, SODIUM LACTATE, POTASSIUM CHLORIDE, CALCIUM CHLORIDE 600; 310; 30; 20 MG/100ML; MG/100ML; MG/100ML; MG/100ML
INJECTION, SOLUTION INTRAVENOUS CONTINUOUS PRN
Status: DISCONTINUED | OUTPATIENT
Start: 2025-04-16 | End: 2025-04-16 | Stop reason: SURG

## 2025-04-16 RX ADMIN — LIDOCAINE HYDROCHLORIDE 60 MG: 20 INJECTION, SOLUTION INFILTRATION; PERINEURAL at 09:38

## 2025-04-16 RX ADMIN — PROPOFOL INJECTABLE EMULSION 180 MCG/KG/MIN: 10 INJECTION, EMULSION INTRAVENOUS at 09:38

## 2025-04-16 RX ADMIN — SODIUM CHLORIDE, POTASSIUM CHLORIDE, SODIUM LACTATE AND CALCIUM CHLORIDE 1000 ML: 600; 310; 30; 20 INJECTION, SOLUTION INTRAVENOUS at 09:23

## 2025-04-16 RX ADMIN — SODIUM CHLORIDE, POTASSIUM CHLORIDE, SODIUM LACTATE AND CALCIUM CHLORIDE: 600; 310; 30; 20 INJECTION, SOLUTION INTRAVENOUS at 09:38

## 2025-04-16 NOTE — ANESTHESIA PREPROCEDURE EVALUATION
Anesthesia Evaluation     Patient summary reviewed and Nursing notes reviewed   history of anesthetic complications:  PONV               Airway   Mallampati: II  Dental      Pulmonary - negative pulmonary ROS   Cardiovascular     ECG reviewed  Rhythm: regular  Rate: normal    (+) hypertension, hyperlipidemia      Neuro/Psych- negative ROS  GI/Hepatic/Renal/Endo - negative ROS     Musculoskeletal (-) negative ROS    Abdominal    Substance History   (+) alcohol use     OB/GYN negative ob/gyn ROS         Other                    Anesthesia Plan    ASA 2     MAC     intravenous induction     Anesthetic plan, risks, benefits, and alternatives have been provided, discussed and informed consent has been obtained with: patient.    CODE STATUS:

## 2025-04-16 NOTE — H&P
Agnes Hayes is a 54 y.o. female  who is referred by Chastity Chapa MD for a colonoscopy. She   has an indications: screening for colon cancer.     She denies any change in bowel function, melena, or hematochezia.    Past Medical History:   Diagnosis Date    Abnormal mammogram 07/2018    Breast asymmetry     Constipation     Frequent UTI     Hemorrhoids     Hyperlipidemia     Hypertension     FOLLOWED BY DR MARIBEL AGUERO CARDIOLOGY D/T FAMILY HISTORY    PONV (postoperative nausea and vomiting)     Screening for colon cancer     MITCH (stress urinary incontinence, female)        Past Surgical History:   Procedure Laterality Date    BUNIONECTOMY Right 2008    BUNIONECTOMY Left 2010    DR. BONITA RICHARDSON    CHOLECYSTECTOMY N/A 2003    DR. TONI GARCIA    COLONOSCOPY N/A 03/20/2023    STOOL IN ENTIRE COLON INTERFERING WITH VISUALIZATION, RESCOPE IN 2 YRS, DR. CHASTITY CHAPA AT [facility]    CYSTOSCOPY N/A 08/2017    DR. STANLEY MILLER    ENDOMETRIAL ABLATION N/A 2007    ENDOSCOPY N/A 09/08/2009    CHRONIC ACTIVE GASTRITIS, DR. GLORY PACHECO AT [facility]    EYE MUSCLE SURGERY      CHILDHOOD    PUBOVAGINAL SLING N/A 11/05/2021    Procedure: PUBO VAGINAL SLING INSERTION OF VAGINAL GRAFT CYSTORETHROSCOPY, AND REMOVAL OF PERIANAL SKIN TAGS;  Surgeon: Tiffanie Hebert MD;  Location: [facility];  Service: Gynecology;  Laterality: N/A;    TUBAL ABDOMINAL LIGATION Bilateral 2002    VAGINAL DELIVERY N/A 04/16/2002    DR. KOSTA KATZ AT [place]    VAGINAL DELIVERY N/A 06/1999    WISDOM TOOTH EXTRACTION         Medications Prior to Admission   Medication Sig Dispense Refill Last Dose/Taking    aspirin 81 MG EC tablet Take 1 tablet by mouth 3 (Three) Times a Week. HELD FOR OR   4/14/2025    B Complex-C-E-Zn (B COMPLEX-C-E-ZINC) tablet Take 1 tablet by mouth Daily. HELD FOR OR   Past Week    Calcium Polycarbophil (FIBER-CAPS PO) Take  by mouth Daily. HELD FOR OR   Past Week    cephalexin (KEFLEX) 500 MG capsule Take 1 capsule by mouth Daily As  Needed (after intercourse). 30 capsule 1 Past Week    cetirizine (ZyrTEC) 10 MG tablet Take 1 tablet by mouth Daily.   Past Week    cholecalciferol (VITAMIN D3) 1000 UNITS tablet Take 1 tablet by mouth Daily. HELD FOR OR   Past Week    losartan (COZAAR) 50 MG tablet Take 1 tablet by mouth Daily. 90 tablet 3 Past Week    multivitamin (DAILY IRENE) tablet tablet Take 1 tablet by mouth Daily. HELD FOR OR   Past Week    rosuvastatin (CRESTOR) 10 MG tablet Take 1 tablet by mouth Daily. 90 tablet 3 Past Week       Allergies   Allergen Reactions    Bactrim [Sulfamethoxazole-Trimethoprim] Diarrhea       Family History   Problem Relation Age of Onset    Heart disease Mother     Heart attack Mother          age 69    Stroke Mother     Hypertension Mother     Diabetes Mother     Heart disease Father     Heart attack Father          age 71    Hypertension Father     Schizophrenia Brother     Mental illness Brother     Breast cancer Neg Hx     Ovarian cancer Neg Hx     Uterine cancer Neg Hx     Colon cancer Neg Hx     Malig Hyperthermia Neg Hx        Social History     Socioeconomic History    Marital status:    Tobacco Use    Smoking status: Never     Passive exposure: Never    Smokeless tobacco: Never   Vaping Use    Vaping status: Never Used   Substance and Sexual Activity    Alcohol use: Yes     Comment: OCCASIONAL    Drug use: No    Sexual activity: Yes     Partners: Male     Birth control/protection: Surgical     Comment: tubal. .       ROS    Vitals:    25 0859   BP: 113/73   Pulse: 71   Resp: 16   SpO2: 100%         Physical Exam  Constitutional:       Appearance: She is well-developed.   HENT:      Head: Normocephalic and atraumatic.   Eyes:      Pupils: Pupils are equal, round, and reactive to light.   Cardiovascular:      Rate and Rhythm: Regular rhythm.   Pulmonary:      Effort: Pulmonary effort is normal.   Abdominal:      General: There is no distension.      Palpations: Abdomen is soft.    Musculoskeletal:         General: Normal range of motion.   Skin:     General: Skin is warm and dry.   Neurological:      Mental Status: She is alert and oriented to person, place, and time.   Psychiatric:         Thought Content: Thought content normal.         Judgment: Judgment normal.           Assessment & Plan      indications: screening for colon cancer         I recommend colonoscopy.  I described risks, benefits of the procedure with the patient including but not limited to bleeding, infection, possibility of perforation and possible polypectomy. All of the patient's questions were answered and they would like to proceed with the above recommendations.

## 2025-04-16 NOTE — DISCHARGE INSTRUCTIONS
For the next 24 hours patient needs to be with a responsible adult.    For 24 hours DO NOT drive, operate machinery, appliances, drink alcohol, make important decisions or sign legal documents.    Start with a light or bland diet if you are feeling sick to your stomach otherwise advance to regular diet as tolerated.    Follow recommendations on procedure report if provided by your doctor.    Call Dr Chapa for problems . Problems may include but not limited to: large amounts of bleeding, trouble breathing, repeated vomiting, severe unrelieved pain, fever or chills.       A positive

## 2025-04-16 NOTE — ANESTHESIA POSTPROCEDURE EVALUATION
Patient: Agnes Hayes    Procedure Summary       Date: 04/16/25 Room / Location: [facility] ENDOSCOPY 6 / [facility] ENDOSCOPY    Anesthesia Start: 0938 Anesthesia Stop: 1010    Procedure: COLONOSCOPY TO CECUM WITH HOT SNARE POLYPECTOMY AND CLIP PLACEMENT X1 Diagnosis:       Screening for colorectal cancer      (Screening for colorectal cancer [Z12.11, Z12.12])    Surgeons: Keith Chapa MD Provider: Ayan Parmar MD    Anesthesia Type: MAC ASA Status: 2            Anesthesia Type: MAC    Vitals  Vitals Value Taken Time   /81 04/16/25 10:31   Temp 36.8 °C (98.2 °F) 04/16/25 10:28   Pulse 63 04/16/25 10:33   Resp 16 04/16/25 10:28   SpO2 100 % 04/16/25 10:33   Vitals shown include unfiled device data.        Post Anesthesia Care and Evaluation    Patient location during evaluation: PACU  Patient participation: complete - patient participated  Level of consciousness: awake and alert  Pain management: adequate    Airway patency: patent  Anesthetic complications: No anesthetic complications    Cardiovascular status: acceptable  Respiratory status: acceptable  Hydration status: acceptable    Comments: ---------------------------               04/16/25                      1028         ---------------------------   BP:          124/81         Pulse:         67           Resp:          16           Temp:   36.8 °C (98.2 °F)   SpO2:         100%         ---------------------------

## 2025-04-17 LAB
CYTO UR: NORMAL
LAB AP CASE REPORT: NORMAL
PATH REPORT.FINAL DX SPEC: NORMAL
PATH REPORT.GROSS SPEC: NORMAL

## 2025-05-23 ENCOUNTER — OFFICE VISIT (OUTPATIENT)
Dept: INTERNAL MEDICINE | Facility: CLINIC | Age: 55
End: 2025-05-23
Payer: COMMERCIAL

## 2025-05-23 VITALS
HEART RATE: 51 BPM | TEMPERATURE: 97.1 F | OXYGEN SATURATION: 100 % | BODY MASS INDEX: 22.18 KG/M2 | HEIGHT: 66 IN | WEIGHT: 138 LBS

## 2025-05-23 DIAGNOSIS — I10 PRIMARY HYPERTENSION: Chronic | ICD-10-CM

## 2025-05-23 DIAGNOSIS — Z76.89 ENCOUNTER TO ESTABLISH CARE: Primary | ICD-10-CM

## 2025-05-23 DIAGNOSIS — N39.0 RECURRENT UTI: Chronic | ICD-10-CM

## 2025-05-23 RX ORDER — LOSARTAN POTASSIUM 50 MG/1
50 TABLET ORAL DAILY
Start: 2025-05-23

## 2025-05-23 NOTE — PROGRESS NOTES
Chief Complaint  [facility] (2024 with Robb with [facility])     Subjective:      History of Present Illness {CC  Problem List  Visit  Diagnosis   Encounters  Notes  Medications  Labs  Result Review Imaging  Media :23}     Agnes Hayes presents to [facility] for:    Patient or patient representative verbalized consent for the use of Ambient Listening during the visit with  HENRY Marcus for chart documentation. 2025  08:49 EDT     History of Present Illness          The patient presents to establish care.    She has been under the care of Dr. Brown for several years, following the USP of Sophia Sullivan. She is scheduled for an annual visit with Dr. Brown on 2025. She recently underwent a colonoscopy, with the next one due in 5 years. She has never received a pneumonia vaccine. She is also under the care of Dr. Arredondo for gynecological issues, with an appointment scheduled for 10/2025. She reports no current symptoms of cough, cold, sneezing, or sniffles.    She has a history of recurrent urinary tract infections (UTIs), which were suspected to be related to sexual intercourse. She was prescribed Keflex by Dr. Nicole Erickson as needed for UTIs.    She recently received a refill of losartan via mail but has not yet started the new bottle.    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     Past Medical History:   Diagnosis Date    Abnormal mammogram 2018    Breast asymmetry     Colon polyps     FOLLOWED BY DR. CHASTITY ACEVEDO    Constipation     Frequent UTI     Hemorrhoids     Hyperlipidemia     Hypertension     FOLLOWED BY DR MARIBEL AGUERO CARDIOLOGY D/T FAMILY HISTORY    PONV (postoperative nausea and vomiting)     Screening for colon cancer     MITCH (stress urinary incontinence, female)      Family History   Problem Relation Age of Onset    Heart disease Mother     Heart attack Mother          age 69     Stroke Mother     Hypertension Mother     Diabetes Mother     Heart disease Father     Heart attack Father          age 71    Hypertension Father     Schizophrenia Brother     Mental illness Brother     Breast cancer Neg Hx     Ovarian cancer Neg Hx     Uterine cancer Neg Hx     Colon cancer Neg Hx     Malig Hyperthermia Neg Hx      Social History     Tobacco Use   Smoking Status Never    Passive exposure: Never   Smokeless Tobacco Never     Social History     Substance and Sexual Activity   Alcohol Use Yes    Comment: OCCASIONAL         I have reviewed patient's medical history, any new submitted information provided by patient or medical assistant and updated medical record.      Objective:      Physical Exam  Vitals reviewed.   Constitutional:       General: She is awake. She is not in acute distress.     Appearance: Normal appearance. She is not ill-appearing, toxic-appearing or diaphoretic.   HENT:      Head: Normocephalic.      Right Ear: Hearing normal.      Left Ear: Hearing normal.      Nose: Nose normal.      Mouth/Throat:      Lips: Pink.      Mouth: Mucous membranes are moist.   Eyes:      General: Lids are normal. Vision grossly intact.      Conjunctiva/sclera: Conjunctivae normal.   Cardiovascular:      Rate and Rhythm: Normal rate and regular rhythm.      Heart sounds: Normal heart sounds.   Pulmonary:      Effort: Pulmonary effort is normal.      Breath sounds: Normal breath sounds.   Abdominal:      General: Bowel sounds are normal.   Skin:     General: Skin is warm and dry.      Capillary Refill: Capillary refill takes less than 2 seconds.   Neurological:      General: No focal deficit present.      Mental Status: She is alert and oriented to person, place, and time. Mental status is at baseline.   Psychiatric:         Attention and Perception: Attention and perception normal.         Mood and Affect: Mood and affect normal.         Speech: Speech normal.         Behavior: Behavior normal.  "Behavior is cooperative.         Cognition and Memory: Cognition and memory normal.         Judgment: Judgment normal.        Result Review  Data Reviewed:{ Labs  Result Review  Imaging  Med Tab  Media :23}     Results         The following data was reviewed by: HENRY Marcus on 05/23/2025  CMP          11/15/2024    08:19   CMP   Glucose 92    BUN 10    Creatinine 0.74    EGFR 97.5    Sodium 136    Potassium 4.6    Chloride 96    Calcium 9.8    Total Protein 7.3    Albumin 4.8    Globulin 2.5    Total Bilirubin 0.3    Alkaline Phosphatase 96    AST (SGOT) 24    ALT (SGPT) 19    Albumin/Globulin Ratio 1.9    BUN/Creatinine Ratio 13.5          Lipid Panel          11/15/2024    08:19   Lipid Panel   Total Cholesterol 249    Triglycerides 63    HDL Cholesterol 84    VLDL Cholesterol 10    LDL Cholesterol  155                   Vital Signs:   Pulse 51   Temp 97.1 °F (36.2 °C) (Temporal)   Ht 167.6 cm (65.98\")   Wt 62.6 kg (138 lb)   SpO2 100%   BMI 22.28 kg/m²     BMI is within normal parameters. No other follow-up for BMI required.             Requested Prescriptions     Signed Prescriptions Disp Refills    losartan (COZAAR) 50 MG tablet       Sig: Take 1 tablet by mouth Daily.       Routine medications provided by this office will also be refilled via pharmacy request.       Current Outpatient Medications:     aspirin 81 MG EC tablet, Take 1 tablet by mouth 3 (Three) Times a Week. HELD FOR OR, Disp: , Rfl:     B Complex-C-E-Zn (B COMPLEX-C-E-ZINC) tablet, Take 1 tablet by mouth Daily. HELD FOR OR, Disp: , Rfl:     Calcium Polycarbophil (FIBER-CAPS PO), Take  by mouth Daily. HELD FOR OR, Disp: , Rfl:     cephalexin (KEFLEX) 500 MG capsule, Take 1 capsule by mouth Daily As Needed (after intercourse)., Disp: 30 capsule, Rfl: 1    cetirizine (ZyrTEC) 10 MG tablet, Take 1 tablet by mouth Daily., Disp: , Rfl:     cholecalciferol (VITAMIN D3) 1000 UNITS tablet, Take 1 tablet by mouth Daily. HELD FOR OR, " Disp: , Rfl:     losartan (COZAAR) 50 MG tablet, Take 1 tablet by mouth Daily., Disp: , Rfl:     multivitamin (DAILY IRENE) tablet tablet, Take 1 tablet by mouth Daily. HELD FOR OR, Disp: , Rfl:     rosuvastatin (CRESTOR) 10 MG tablet, Take 1 tablet by mouth Daily., Disp: 90 tablet, Rfl: 3     Assessment and Plan:      Assessment and Plan {CC Problem List  Visit Diagnosis  ROS  Review (Popup)  Health Maintenance  Quality  BestPractice  Medications  SmartSets  SnapShot Encounters  Media :23}     Problem List Items Addressed This Visit    None  Visit Diagnoses         Encounter to establish care    -  Primary      Hypertension, benign  (Chronic)       Relevant Medications    losartan (COZAAR) 50 MG tablet                 1. Health maintenance.  - Up to date on all health maintenance screenings.  - Due for a pneumonia vaccine, advised to receive it closer to the fall.  - Next colonoscopy scheduled for 2030.    2. Recurrent urinary tract infections.  - History of recurrent UTIs, possibly related to intercourse.  - Prescribed Keflex by Dr. Nicole Erickson, used as needed.    3. Medication management.  - Recently received a refill of losartan, has not started the new bottle yet.  - Requests current provider to manage losartan prescription.     Thank you for allowing us to care for you,  HENRY Marcus    Follow Up {Instructions Charge Capture  Follow-up Communications :23}     No follow-ups on file.      Patient was given instructions and counseling regarding her condition or for health maintenance advice. Please see specific information pulled into the AVS if appropriate.      Dragon disclaimer:   Much of this encounter note is an electronic transcription/translation of spoken language to printed text. The electronic translation of spoken language may permit erroneous, or at times, nonsensical words or phrases to be inadvertently transcribed; Although I have reviewed the note for such errors, some may  still exist.     Additional Patient Counseling:       There are no Patient Instructions on file for this visit.

## 2025-05-30 ENCOUNTER — LAB (OUTPATIENT)
Dept: LAB | Facility: HOSPITAL | Age: 55
End: 2025-05-30
Payer: COMMERCIAL

## 2025-05-30 DIAGNOSIS — E78.5 HYPERLIPIDEMIA LDL GOAL <100: ICD-10-CM

## 2025-05-30 LAB
CHOLEST SERPL-MCNC: 140 MG/DL (ref 0–200)
HDLC SERPL-MCNC: 69 MG/DL (ref 40–60)
LDLC SERPL CALC-MCNC: 59 MG/DL (ref 0–100)
LDLC/HDLC SERPL: 0.86 {RATIO}
TRIGL SERPL-MCNC: 59 MG/DL (ref 0–150)
VLDLC SERPL-MCNC: 12 MG/DL (ref 5–40)

## 2025-05-30 PROCEDURE — 80061 LIPID PANEL: CPT

## 2025-05-30 PROCEDURE — 83695 ASSAY OF LIPOPROTEIN(A): CPT

## 2025-05-30 PROCEDURE — 82172 ASSAY OF APOLIPOPROTEIN: CPT

## 2025-05-30 PROCEDURE — 36415 COLL VENOUS BLD VENIPUNCTURE: CPT

## 2025-05-31 LAB — LPA SERPL-SCNC: 221.7 NMOL/L

## 2025-06-01 LAB — APO B SERPL-MCNC: 58 MG/DL

## 2025-06-03 ENCOUNTER — OFFICE VISIT (OUTPATIENT)
Age: 55
End: 2025-06-03
Payer: COMMERCIAL

## 2025-06-03 VITALS
BODY MASS INDEX: 22.5 KG/M2 | DIASTOLIC BLOOD PRESSURE: 74 MMHG | SYSTOLIC BLOOD PRESSURE: 130 MMHG | HEIGHT: 66 IN | HEART RATE: 59 BPM | WEIGHT: 140 LBS

## 2025-06-03 DIAGNOSIS — E78.5 HYPERLIPIDEMIA, UNSPECIFIED HYPERLIPIDEMIA TYPE: Primary | ICD-10-CM

## 2025-06-03 PROCEDURE — 93000 ELECTROCARDIOGRAM COMPLETE: CPT | Performed by: INTERNAL MEDICINE

## 2025-06-03 PROCEDURE — 99213 OFFICE O/P EST LOW 20 MIN: CPT | Performed by: INTERNAL MEDICINE

## 2025-06-03 NOTE — PROGRESS NOTES
Subjective:     Encounter Date: 25      Patient ID: Agnes Hayes is a 55 y.o. female.    Chief Complaint: Cardiovascular risk assessment  HPI:   This is a 55-year-old woman who presents for evaluation.  She has a strong family history of coronary disease.  Her mother had a stroke at the age of 69.  Her father had coronary artery bypass graft surgery at the age of 60.  She herself has a history of hypertension and hyperlipidemia.    She leads a healthy lifestyle. She was started on crestor 10 for hyperlipidemia as well as elevated Lpa. In  she had a calcium score of 0. She returns today.  Her LDL has reduced from 1 55-59.  Her LP(a) obviously remains elevated.  She is worried about that however is happy with her lipids.  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  She does not drink alcohol.    The following portions of the patient's history were reviewed and updated as appropriate: allergies, current medications, past family history, past medical history, past social history, past surgical history and problem list.     REVIEW OF SYSTEMS:   All systems reviewed.  Pertinent positives identified in HPI.  All other systems are negative.    Past Medical History:   Diagnosis Date    Abnormal mammogram 2018    Breast asymmetry     Colon polyps     FOLLOWED BY DR. CHASTITY ACEVEDO    Constipation     Frequent UTI     Hemorrhoids     Hyperlipidemia     Hypertension     FOLLOWED BY DR MARIBEL AGUERO CARDIOLOGY D/T FAMILY HISTORY    PONV (postoperative nausea and vomiting)     Screening for colon cancer     MITCH (stress urinary incontinence, female)        Family History   Problem Relation Age of Onset    Heart disease Mother     Heart attack Mother          age 69    Stroke Mother     Hypertension Mother     Diabetes Mother     Heart disease Father     Heart attack Father          age 71     Hypertension Father     Schizophrenia Brother     Mental illness Brother     Breast cancer Neg Hx     Ovarian cancer Neg Hx     Uterine cancer Neg Hx     Colon cancer Neg Hx     Malig Hyperthermia Neg Hx        Social History     Socioeconomic History    Marital status:    Tobacco Use    Smoking status: Never     Passive exposure: Never    Smokeless tobacco: Never   Vaping Use    Vaping status: Never Used   Substance and Sexual Activity    Alcohol use: Yes     Comment: OCCASIONAL    Drug use: No    Sexual activity: Yes     Partners: Male     Birth control/protection: Surgical     Comment: tubal. .       Allergies   Allergen Reactions    Bactrim [Sulfamethoxazole-Trimethoprim] Diarrhea       Past Surgical History:   Procedure Laterality Date    BUNIONECTOMY Right 2008    BUNIONECTOMY Left 2010    DR. BONITA RICHARDSON    CHOLECYSTECTOMY N/A 2003    DR. TONI GARCIA    COLONOSCOPY N/A 03/20/2023    STOOL IN ENTIRE COLON INTERFERING WITH VISUALIZATION, RESCOPE IN 2 YRS, DR. CHASTITY ACEVEDO AT [facility]    COLONOSCOPY N/A 04/16/2025    7 MM TUBULAR ADENOMA POLYP IN TRANSVERSE, RESCOPE IN 5 YRS, DR. CHASTITY ACEVEDO AT [facility]    CYSTOSCOPY N/A 08/2017    DR. STANLEY MILLER    ENDOMETRIAL ABLATION N/A 2007    ENDOSCOPY N/A 09/08/2009    CHRONIC ACTIVE GASTRITIS, DR. GLORY PACHECO AT [facility]    EYE MUSCLE SURGERY      CHILDHOOD    PUBOVAGINAL SLING N/A 11/05/2021    Procedure: PUBO VAGINAL SLING INSERTION OF VAGINAL GRAFT CYSTORETHROSCOPY, AND REMOVAL OF PERIANAL SKIN TAGS;  Surgeon: Tiffanie Hebert MD;  Location: [facility];  Service: Gynecology;  Laterality: N/A;    TUBAL ABDOMINAL LIGATION Bilateral 2002    VAGINAL DELIVERY N/A 04/16/2002    DR. KOSTA KATZ AT [place]    VAGINAL DELIVERY N/A 06/1999    WISDOM TOOTH EXTRACTION           ECG 12 Lead    Date/Time: 6/3/2025 12:48 PM  Performed by: Elsa Moreno MD    Authorized by: Elsa Moreno MD  Comparison: compared with previous ECG from 5/28/2024  Rhythm: sinus  rhythm  Rate: normal  Conduction: conduction normal  Q waves: V2 and V1    ST Segments: ST segments normal  T Waves: T waves normal  QRS axis: normal  Other: no other findings    Clinical impression: non-specific ECG             Objective:         PHYSICAL EXAM:  GEN: VSS, no distress,   Eyes: normal sclera, normal lids and lashes  HENT: moist mucus membranes,   Respiratory: CTAB, no rales or wheezes  CV: RRR, no murmurs, , +2 DP and 2+ carotid pulses b/l  GI: NABS, soft,  Nontender, nondistended  MSK: no edema, no scoliosis or kyphosis  Skin: no rash, warm, dry  Heme/Lymph: no bruising or bleeding  Psych: organized thought, normal behavior and affect  Neuro: Cranial nerves grossly intact, Alert and Oriented x 3.         Assessment:          Diagnosis Plan   1. Hyperlipidemia, unspecified hyperlipidemia type             Plan:       1.  Hyperlipidemia, great improvement in LDL with Crestor 10.  Continue.  When a drug that is targeted for LP(a) comes to the market we would intend on starting that.  For now would not repeat that laboratory test as it will not change with statins.     Aleta thank you very much for referring this kind patient to me. Please call me with any questions or concerns. I will see the patient again in the office in 12 months.         Elsa Moreno MD  06/03/25  [facility]    Outpatient Encounter Medications as of 6/3/2025   Medication Sig Dispense Refill    aspirin 81 MG EC tablet Take 1 tablet by mouth 3 (Three) Times a Week. HELD FOR OR      B Complex-C-E-Zn (B COMPLEX-C-E-ZINC) tablet Take 1 tablet by mouth Daily. HELD FOR OR      Calcium Polycarbophil (FIBER-CAPS PO) Take  by mouth Daily. HELD FOR OR      cephalexin (KEFLEX) 500 MG capsule Take 1 capsule by mouth Daily As Needed (after intercourse). 30 capsule 1    cetirizine (ZyrTEC) 10 MG tablet Take 1 tablet by mouth Daily.      cholecalciferol (VITAMIN D3) 1000 UNITS tablet Take 1 tablet by mouth Daily. HELD FOR OR       losartan (COZAAR) 50 MG tablet Take 1 tablet by mouth Daily.      multivitamin (DAILY IRENE) tablet tablet Take 1 tablet by mouth Daily. HELD FOR OR      rosuvastatin (CRESTOR) 10 MG tablet Take 1 tablet by mouth Daily. 90 tablet 3    [DISCONTINUED] pravastatin (Pravachol) 20 MG tablet Take 1 tablet by mouth Daily. 90 tablet 3     No facility-administered encounter medications on file as of 6/3/2025.

## 2025-08-18 DIAGNOSIS — I10 HYPERTENSION, BENIGN: ICD-10-CM

## 2025-08-18 RX ORDER — LOSARTAN POTASSIUM 50 MG/1
50 TABLET ORAL DAILY
Qty: 90 TABLET | Refills: 3 | OUTPATIENT
Start: 2025-08-18

## (undated) DEVICE — NDL HYPO PRECISIONGLIDE REG 25G 1 1/2

## (undated) DEVICE — ADHS SKIN SURG TISS VISC PREMIERPRO EXOFIN HI/VISC FAST/DRY

## (undated) DEVICE — ADAPT CLN BIOGUARD AIR/H2O DISP

## (undated) DEVICE — ANTIBACTERIAL UNDYED BRAIDED (POLYGLACTIN 910), SYNTHETIC ABSORBABLE SUTURE: Brand: COATED VICRYL

## (undated) DEVICE — CANN O2 ETCO2 FITS ALL CONN CO2 SMPL A/ 7IN DISP LF

## (undated) DEVICE — SENSR O2 OXIMAX FNGR A/ 18IN NONSTR

## (undated) DEVICE — SNAR POLYP SENSATION STDOVL 27 240 BX40

## (undated) DEVICE — LEGGINGS, PAIR, CLEAR, STERILE: Brand: MEDLINE

## (undated) DEVICE — TUBING, SUCTION, 1/4" X 20', STRAIGHT: Brand: MEDLINE INDUSTRIES, INC.

## (undated) DEVICE — KT ORCA ORCAPOD DISP STRL

## (undated) DEVICE — RETR STAY HK ELAS SHRP 5MM PK/8

## (undated) DEVICE — TRAP FLD MINIVAC MEGADYNE 100ML

## (undated) DEVICE — DRP Z/FRICTION 10X16IN

## (undated) DEVICE — SUT MNCRYL 4/0 SH 27IN Y415H

## (undated) DEVICE — LN SMPL CO2 SHTRM SD STREAM W/M LUER

## (undated) DEVICE — PUNCH BIOP 2MM

## (undated) DEVICE — RETR RNG GEN2 31.8X18.3CM

## (undated) DEVICE — 3M™ STERI-DRAPE™ INSTRUMENT POUCH 1018: Brand: STERI-DRAPE™

## (undated) DEVICE — ST IRR CYSTO W/SPK 77IN LF

## (undated) DEVICE — DRAPE,REIN 53X77,STERILE: Brand: MEDLINE

## (undated) DEVICE — DRAPE,UNDERBUTTOCKS,PCH,STERILE: Brand: MEDLINE

## (undated) DEVICE — GLV SURG BIOGEL LTX PF 6 1/2

## (undated) DEVICE — PATIENT RETURN ELECTRODE, SINGLE-USE, CONTACT QUALITY MONITORING, ADULT, WITH 9FT CORD, FOR PATIENTS WEIGING OVER 33LBS. (15KG): Brand: MEGADYNE

## (undated) DEVICE — THE SINGLE USE ETRAP – POLYP TRAP IS USED FOR SUCTION RETRIEVAL OF ENDOSCOPICALLY REMOVED POLYPS.: Brand: ETRAP

## (undated) DEVICE — PENCL E/S ULTRAVAC TELESCP NOSE HOLSTR 10FT

## (undated) DEVICE — TUBING, SUCTION, 1/4" X 10', STRAIGHT: Brand: MEDLINE

## (undated) DEVICE — SYR CONTRL LUERLOK 10CC

## (undated) DEVICE — PK HYST VAG 40

## (undated) DEVICE — SUT VIC 0 TIES 18IN J912G